# Patient Record
Sex: FEMALE | Race: WHITE | NOT HISPANIC OR LATINO | Employment: OTHER | URBAN - NONMETROPOLITAN AREA
[De-identification: names, ages, dates, MRNs, and addresses within clinical notes are randomized per-mention and may not be internally consistent; named-entity substitution may affect disease eponyms.]

---

## 2017-01-03 ENCOUNTER — PROCEDURE VISIT (OUTPATIENT)
Dept: OBSTETRICS AND GYNECOLOGY | Facility: CLINIC | Age: 72
End: 2017-01-03

## 2017-01-03 VITALS
DIASTOLIC BLOOD PRESSURE: 84 MMHG | WEIGHT: 178 LBS | SYSTOLIC BLOOD PRESSURE: 155 MMHG | HEIGHT: 66 IN | HEART RATE: 86 BPM | BODY MASS INDEX: 28.61 KG/M2

## 2017-01-03 DIAGNOSIS — Z12.31 SCREENING MAMMOGRAM, ENCOUNTER FOR: ICD-10-CM

## 2017-01-03 DIAGNOSIS — Z01.419 ENCOUNTER FOR GYNECOLOGICAL EXAMINATION WITHOUT ABNORMAL FINDING: Primary | ICD-10-CM

## 2017-01-03 DIAGNOSIS — Z80.41 FH: OVARIAN CANCER IN FIRST DEGREE RELATIVE: ICD-10-CM

## 2017-01-03 PROCEDURE — G0101 CA SCREEN;PELVIC/BREAST EXAM: HCPCS | Performed by: NURSE PRACTITIONER

## 2017-01-03 NOTE — PROGRESS NOTES
Subjective   Chief Complaint   Patient presents with   • Gynecologic Exam     annual exam     Akiko Blackwell is a 71 y.o. year old  presenting to be seen for her annual exam and mammogram. Requesting CA-125 lab since her mother had ovarian cancer. Not covered by Medicare; pt signed ABN with the option to bill Medicare and secondary insurance and she will pay the difference.     She is not sexually active.   LMP >20 years ago; denies PMB  Last pap-  WNL  Last mammogram- 16 WNL  Last DEXA- 16; osteopenia in hips, normal spine  Last colonoscopy- ; recommended 10 year f/u    She exercises regularly: no.  She wears her seat belt:yes.  She has concerns about domestic violence: no.  She has noticed changes in height: no.    The following portions of the patient's history were reviewed and updated as appropriate:problem list, current medications, allergies, past family history, past medical history, past social history and past surgical history.  Smoking status: Former Smoker                                                              Packs/day: 0.00      Years: 0.00         Smokeless status: Not on file                       Review of Systems   Constitutional: Negative for activity change, appetite change, fatigue and unexpected weight change.   Respiratory: Negative for apnea, chest tightness and shortness of breath.    Cardiovascular: Negative for chest pain, palpitations and leg swelling.   Gastrointestinal: Negative for abdominal distention, abdominal pain, anal bleeding, blood in stool, constipation, diarrhea, nausea and vomiting.   Endocrine: Negative for polyuria.   Genitourinary: Negative for decreased urine volume, difficulty urinating, dysuria, frequency, genital sores, pelvic pain, urgency, vaginal bleeding, vaginal discharge and vaginal pain.   Musculoskeletal: Negative for gait problem.   Skin: Negative for color change and rash.   Allergic/Immunologic: Negative for  "immunocompromised state.   Neurological: Negative for dizziness, weakness, light-headedness and headaches.   Hematological: Negative for adenopathy.   Psychiatric/Behavioral: Negative for agitation, confusion, dysphoric mood and sleep disturbance. The patient is not nervous/anxious.          Objective   Visit Vitals   • /84   • Pulse 86   • Ht 66\" (167.6 cm)   • Wt 178 lb (80.7 kg)   • BMI 28.73 kg/m2       General:  well developed; well nourished  no acute distress   Skin:  No suspicious lesions seen   Thyroid: normal to inspection and palpation   Breasts:  Examined in supine position  Symmetric without masses or skin dimpling  Nipples normal without inversion, lesions or discharge  There are no palpable axillary nodes   Abdomen: soft, non-tender; no masses  no umbilical or inginual hernias are present  no hepato-splenomegaly   Pelvis: External genitalia:  normal appearance of the external genitalia including Bartholin's and [place]'s glands  :  urethral meatus normal and urethra hypermobile  Vaginal:  atrophic mucosal changes are present  Cervix:  normal appearance PAP SMEAR NOT OBTAINED.  Uterus:  normal size, shape and consistency and tenderness to palpation is absent  Adnexa:  non palpable bilaterally  Rectal:  digital rectal exam not performed, anus visually normal appearing  Pelvic floor pain: pelvic floor is nontender to palpation in 4 quadrants.     Lab Review   No data reviewed    Imaging  SHANNON Wharton was seen today for gynecologic exam.    Diagnoses and all orders for this visit:    Encounter for gynecological examination without abnormal finding    Screening mammogram, encounter for  -     Mammo Screening Digital Tomosynthesis Bilateral With CAD    FH: ovarian cancer in first degree relative  -               This note was electronically signed.    "

## 2017-01-05 LAB — CANCER AG125 SERPL-ACNC: 11.5 U/ML (ref 0–38.1)

## 2017-01-09 ENCOUNTER — TELEPHONE (OUTPATIENT)
Dept: OBSTETRICS AND GYNECOLOGY | Facility: CLINIC | Age: 72
End: 2017-01-09

## 2017-01-09 NOTE — TELEPHONE ENCOUNTER
----- Message from LOUISE Lr sent at 1/9/2017 11:36 AM CST -----  Please let her know that the  was normal.

## 2017-03-16 ENCOUNTER — OFFICE VISIT (OUTPATIENT)
Dept: FAMILY MEDICINE CLINIC | Facility: CLINIC | Age: 72
End: 2017-03-16

## 2017-03-16 VITALS
OXYGEN SATURATION: 98 % | HEIGHT: 66 IN | HEART RATE: 82 BPM | WEIGHT: 177 LBS | SYSTOLIC BLOOD PRESSURE: 138 MMHG | DIASTOLIC BLOOD PRESSURE: 80 MMHG | BODY MASS INDEX: 28.45 KG/M2

## 2017-03-16 DIAGNOSIS — M17.0 PRIMARY OSTEOARTHRITIS OF BOTH KNEES: ICD-10-CM

## 2017-03-16 DIAGNOSIS — K20.90 ESOPHAGITIS: Primary | ICD-10-CM

## 2017-03-16 DIAGNOSIS — M85.80 OSTEOPENIA: ICD-10-CM

## 2017-03-16 PROCEDURE — 99214 OFFICE O/P EST MOD 30 MIN: CPT | Performed by: FAMILY MEDICINE

## 2017-03-16 RX ORDER — FAMOTIDINE 40 MG/1
40 TABLET, FILM COATED ORAL DAILY
Qty: 90 TABLET | Refills: 3 | Status: SHIPPED | OUTPATIENT
Start: 2017-03-16 | End: 2017-09-12 | Stop reason: CLARIF

## 2017-03-17 PROBLEM — M17.0 PRIMARY OSTEOARTHRITIS OF BOTH KNEES: Status: ACTIVE | Noted: 2017-03-17

## 2017-03-17 NOTE — PROGRESS NOTES
Subjective:     Akiko Blackwell is a 71 y.o. female   GERD  Paitent complains of heartburn. This has been associated with midespigastric pain and nocturnal burning.  She denies choking on food, cough, hematemesis and hoarseness. Symptoms have been present for 4 years. She denies dysphagia. She has not lost weight. She denies melena, hematochezia, hematemesis, and coffee ground emesis. Medical therapy in the past has included H2 antagonists and proton pump inhibitors.  Has been on Nexium 40 mg daily for over 4 years.  She states that she has no nocturnal burning or problems as long as she has been on the medication.  She does remember trying an H2 blocker in the past but does not remember which one or the dose.  She is concerned about long-term use of proton pump inhibitor.  Knee Pain  Patient presents with knee pain involving both knees. Onset of the symptoms was several months ago. Inciting event: none known. Current symptoms include crepitus sensation, pain located generalized, stiffness and swelling. Pain is aggravated by going up and down stairs, inactivity and rising after sitting. Patient has had no prior knee problems. Evaluation to date: none. Treatment to date: OTC analgesics which are effective.  Preventative:  Over the past 2 weeks, have you felt down, depressed, or hopeless?No   Over the past 2 weeks, have you felt little interest or pleasure in doing things?No  Clinical depression screening refused by patient.No     On osteoporosis therapy?No     Past Medical Hx:   Past Medical History   Diagnosis Date   • Acute bronchitis, unspecified    • Acute laryngitis    • Allergic rhinitis    • Atrophic vaginitis    • Diverticular disease of colon    • Encounter for gynecological examination (general) (routine) without abnormal findings    • Encounter for screening for malignant neoplasm of colon    • Encounter for screening for osteoporosis    • Esophagitis    • Family history of malignant neoplasm of  gastrointestinal tract    • GERD (gastroesophageal reflux disease)    • History of bone density study 04/29/2016     DXA BONE DENSITY AXIAL [address] [facility] (2)    • History of screening mammography 01/02/2016   • Osteopenia    • Pain in right knee    • Subclinical hypothyroidism    • Thyroid nodule    • Viral upper respiratory tract infection    • Vitamin D deficiency        Past Surgical Hx:  Past Surgical History   Procedure Laterality Date   • Breast biopsy       BX BREAST PERCUT W/O IMAGE 52138 (1)   x2   • Colonoscopy  06/20/2016     Diverticulosis in the sigmoid colon and in the descending colon.External and internal hemorrhoids.No specimens collected.   • Cervix surgery  05/15/1973     Conization biopsy of cervix. Cervical cautery.   • Dilatation and curettage  10/10/1990     Fractional   • Endoscopy w/ peg tube placement  06/20/2012     Esophagitis seen. Biopsy taken. Gastritis in stomach. Biopsy taken. Hiatus hernia in stomach. Normal duodenum. Biopsy taken.   • Injection of medication  05/04/2016     Kenalog (4)      • Pap smear  07/22/2010     Mild inflammation present. Negative   • Excision lesion  05/18/1967     Excision of inclusion cyst, vagina.   • Tonsillectomy  01/15/1964     Recurring acute tonsillitis.       Health Maintenance:  Health Maintenance   Topic Date Due   • TDAP/TD VACCINES (1 - Tdap) 09/28/1963   • INFLUENZA VACCINE  08/01/2016   • MEDICARE ANNUAL WELLNESS  03/16/2018   • DXA SCAN  04/29/2018   • COLONOSCOPY  06/20/2026   • PNEUMOCOCCAL VACCINES (65+ LOW/MEDIUM RISK)  Completed   • ZOSTER VACCINE  Completed       Current Meds:    Current Outpatient Prescriptions:   •  Calcium Carbonate-Vitamin D 600-200 MG-UNIT tablet, Take 1 tablet by mouth Daily., Disp: , Rfl:   •  levothyroxine (SYNTHROID, LEVOTHROID) 88 MCG tablet, TAKE ONE TABLET BY MOUTH DAILY, Disp: 90 tablet, Rfl: 0  •  esomeprazole (NEXIUM) 20 MG capsule, Take 1 capsule by mouth Every Morning Before Breakfast., Disp: 90  "capsule, Rfl: 1  •  famotidine (PEPCID) 40 MG tablet, Take 1 tablet by mouth Daily., Disp: 90 tablet, Rfl: 3    Allergies:  Review of patient's allergies indicates no known allergies.    Family Hx:  Family History   Problem Relation Age of Onset   • Ovarian cancer Mother      Onset age 70-74 years.   • Cancer Brother      Colorectal Cancer, onset age 70-74 years.   • Diabetes Other         Social History:  Social History     Social History   • Marital status:      Spouse name: N/A   • Number of children: N/A   • Years of education: N/A     Occupational History   • Not on file.     Social History Main Topics   • Smoking status: Former Smoker   • Smokeless tobacco: Never Used   • Alcohol use Not on file   • Drug use: Not on file   • Sexual activity: Not on file     Other Topics Concern   • Not on file     Social History Narrative       Review of Systems  Review of Systems   Constitutional: Negative for activity change, appetite change, fatigue and fever.   HENT: Negative for ear pain and sore throat.    Eyes: Negative for pain and visual disturbance.   Respiratory: Negative for cough and shortness of breath.    Cardiovascular: Negative for chest pain and palpitations.   Gastrointestinal: Negative for abdominal pain and nausea.   Endocrine: Negative for cold intolerance and heat intolerance.   Genitourinary: Negative for difficulty urinating and dysuria.   Musculoskeletal: Positive for arthralgias. Negative for gait problem.   Skin: Negative for color change and rash.   Neurological: Negative for dizziness, weakness and headaches.   Hematological: Negative for adenopathy. Does not bruise/bleed easily.   Psychiatric/Behavioral: Negative for agitation, confusion and sleep disturbance.       Objective:     Visit Vitals   • /80 (BP Location: Right arm, Patient Position: Sitting, Cuff Size: Adult)   • Pulse 82   • Ht 66\" (167.6 cm)   • Wt 177 lb (80.3 kg)   • SpO2 98%   • BMI 28.57 kg/m2     Physical Exam "   Constitutional: She is oriented to person, place, and time. She appears well-developed and well-nourished.   HENT:   Head: Normocephalic and atraumatic.   Right Ear: Hearing, tympanic membrane, external ear and ear canal normal.   Left Ear: Hearing, tympanic membrane, external ear and ear canal normal.   Nose: Nose normal.   Mouth/Throat: Oropharynx is clear and moist.   Eyes: Conjunctivae and EOM are normal. Pupils are equal, round, and reactive to light.   Neck: Normal range of motion. Neck supple.   Cardiovascular: Normal rate, regular rhythm and normal heart sounds.    Pulmonary/Chest: Effort normal and breath sounds normal.   Abdominal: Soft. Bowel sounds are normal. She exhibits no distension. There is no tenderness.   Musculoskeletal: Normal range of motion.        Right knee: She exhibits bony tenderness. Tenderness found. Lateral joint line tenderness noted.        Left knee: She exhibits bony tenderness. Tenderness found. Lateral joint line tenderness noted.   Crepitus bilateral knees   Neurological: She is alert and oriented to person, place, and time.   Skin: Skin is warm and dry.   Psychiatric: She has a normal mood and affect. Her speech is normal and behavior is normal. Cognition and memory are normal.             Assessment:       1. Esophagitis    2. Osteopenia    3. Primary osteoarthritis of both knees         Plan:     1.  Rx changes: Decrease nexium to 20mg. Will attempt to wean off time pump inhibitor.  Patient will start using Pepcid.   Discontinue calcium carbonate and get calcium citrate as on PPI.  2.  Follow up: 10 months   3. Continue follow up with Endocrinology. Will take over mammogram screenings.     GOALS:  Weight loss 10 pounds  BARRIERS TO GOALS:  None    Preventative:  Female Preventative: Colon cancer screening is up to date    Recommended:Tdap advised to get at the health department. Will obtain records from [facility] for zostavax and influenza.   Patient is a non smoker.    does not drink  eat more fruits and vegetables, decrease soda or juice intake, increase water intake, increase physical activity, plan meals, eat breakfast and have 3 meals a day discussed non-weight bearing exercises for knee    RISK SCORE: 3         This document has been electronically signed by Barbie Murray MD on March 17, 2017 3:35 PM

## 2017-04-12 RX ORDER — LEVOTHYROXINE SODIUM 88 UG/1
88 TABLET ORAL DAILY
Qty: 90 TABLET | Refills: 3 | Status: SHIPPED | OUTPATIENT
Start: 2017-04-12 | End: 2017-05-15 | Stop reason: SDUPTHER

## 2017-04-19 RX ORDER — ESOMEPRAZOLE MAGNESIUM 40 MG/1
40 CAPSULE, DELAYED RELEASE ORAL
Qty: 30 CAPSULE | Refills: 3 | Status: SHIPPED | OUTPATIENT
Start: 2017-04-19 | End: 2017-09-12 | Stop reason: SDUPTHER

## 2017-05-08 ENCOUNTER — LAB (OUTPATIENT)
Dept: LAB | Facility: HOSPITAL | Age: 72
End: 2017-05-08

## 2017-05-08 DIAGNOSIS — E53.8 B12 DEFICIENCY: ICD-10-CM

## 2017-05-08 DIAGNOSIS — E06.3 HYPOTHYROIDISM DUE TO HASHIMOTO'S THYROIDITIS: ICD-10-CM

## 2017-05-08 DIAGNOSIS — M85.80 OSTEOPENIA: ICD-10-CM

## 2017-05-08 DIAGNOSIS — E03.8 HYPOTHYROIDISM DUE TO HASHIMOTO'S THYROIDITIS: ICD-10-CM

## 2017-05-08 DIAGNOSIS — E04.1 THYROID NODULE: ICD-10-CM

## 2017-05-08 DIAGNOSIS — E55.9 VITAMIN D DEFICIENCY: ICD-10-CM

## 2017-05-08 LAB
25(OH)D3 SERPL-MCNC: 29.4 NG/ML (ref 30–100)
ALBUMIN SERPL-MCNC: 4.3 G/DL (ref 3.4–4.8)
ALBUMIN/GLOB SERPL: 1.5 G/DL (ref 1.1–1.8)
ALP SERPL-CCNC: 116 U/L (ref 38–126)
ALT SERPL W P-5'-P-CCNC: 28 U/L (ref 9–52)
ANION GAP SERPL CALCULATED.3IONS-SCNC: 10 MMOL/L (ref 5–15)
AST SERPL-CCNC: 36 U/L (ref 14–36)
BASOPHILS # BLD AUTO: 0.02 10*3/MM3 (ref 0–0.2)
BASOPHILS NFR BLD AUTO: 0.4 % (ref 0–2)
BILIRUB SERPL-MCNC: 0.5 MG/DL (ref 0.2–1.3)
BUN BLD-MCNC: 19 MG/DL (ref 7–21)
BUN/CREAT SERPL: 22.1 (ref 7–25)
CALCIUM SPEC-SCNC: 9.6 MG/DL (ref 8.4–10.2)
CHLORIDE SERPL-SCNC: 102 MMOL/L (ref 95–110)
CO2 SERPL-SCNC: 28 MMOL/L (ref 22–31)
CREAT BLD-MCNC: 0.86 MG/DL (ref 0.5–1)
DEPRECATED RDW RBC AUTO: 39.3 FL (ref 36.4–46.3)
EOSINOPHIL # BLD AUTO: 0.07 10*3/MM3 (ref 0–0.7)
EOSINOPHIL NFR BLD AUTO: 1.5 % (ref 0–7)
ERYTHROCYTE [DISTWIDTH] IN BLOOD BY AUTOMATED COUNT: 12.3 % (ref 11.5–14.5)
GFR SERPL CREATININE-BSD FRML MDRD: 65 ML/MIN/1.73 (ref 39–90)
GLOBULIN UR ELPH-MCNC: 2.9 GM/DL (ref 2.3–3.5)
GLUCOSE BLD-MCNC: 96 MG/DL (ref 60–100)
HCT VFR BLD AUTO: 43.1 % (ref 35–45)
HGB BLD-MCNC: 15.3 G/DL (ref 12–15.5)
IMM GRANULOCYTES # BLD: 0.01 10*3/MM3 (ref 0–0.02)
IMM GRANULOCYTES NFR BLD: 0.2 % (ref 0–0.5)
LYMPHOCYTES # BLD AUTO: 1.44 10*3/MM3 (ref 0.6–4.2)
LYMPHOCYTES NFR BLD AUTO: 30.5 % (ref 10–50)
MAGNESIUM SERPL-MCNC: 2.1 MG/DL (ref 1.6–2.3)
MCH RBC QN AUTO: 31.2 PG (ref 26.5–34)
MCHC RBC AUTO-ENTMCNC: 35.5 G/DL (ref 31.4–36)
MCV RBC AUTO: 87.8 FL (ref 80–98)
MONOCYTES # BLD AUTO: 0.37 10*3/MM3 (ref 0–0.9)
MONOCYTES NFR BLD AUTO: 7.8 % (ref 0–12)
NEUTROPHILS # BLD AUTO: 2.81 10*3/MM3 (ref 2–8.6)
NEUTROPHILS NFR BLD AUTO: 59.6 % (ref 37–80)
PLATELET # BLD AUTO: 243 10*3/MM3 (ref 150–450)
PMV BLD AUTO: 9.7 FL (ref 8–12)
POTASSIUM BLD-SCNC: 4.4 MMOL/L (ref 3.5–5.1)
PROT SERPL-MCNC: 7.2 G/DL (ref 6.3–8.6)
RBC # BLD AUTO: 4.91 10*6/MM3 (ref 3.77–5.16)
SODIUM BLD-SCNC: 140 MMOL/L (ref 137–145)
TSH SERPL DL<=0.05 MIU/L-ACNC: 5.63 MIU/ML (ref 0.46–4.68)
VIT B12 BLD-MCNC: 318 PG/ML (ref 239–931)
WBC NRBC COR # BLD: 4.72 10*3/MM3 (ref 3.2–9.8)

## 2017-05-08 PROCEDURE — 36415 COLL VENOUS BLD VENIPUNCTURE: CPT

## 2017-05-08 PROCEDURE — 84443 ASSAY THYROID STIM HORMONE: CPT | Performed by: INTERNAL MEDICINE

## 2017-05-08 PROCEDURE — 83735 ASSAY OF MAGNESIUM: CPT | Performed by: INTERNAL MEDICINE

## 2017-05-08 PROCEDURE — 82306 VITAMIN D 25 HYDROXY: CPT | Performed by: INTERNAL MEDICINE

## 2017-05-08 PROCEDURE — 85025 COMPLETE CBC W/AUTO DIFF WBC: CPT | Performed by: INTERNAL MEDICINE

## 2017-05-08 PROCEDURE — 82607 VITAMIN B-12: CPT | Performed by: INTERNAL MEDICINE

## 2017-05-08 PROCEDURE — 80053 COMPREHEN METABOLIC PANEL: CPT | Performed by: INTERNAL MEDICINE

## 2017-05-15 ENCOUNTER — OFFICE VISIT (OUTPATIENT)
Dept: ENDOCRINOLOGY | Facility: CLINIC | Age: 72
End: 2017-05-15

## 2017-05-15 VITALS
SYSTOLIC BLOOD PRESSURE: 134 MMHG | BODY MASS INDEX: 28.54 KG/M2 | WEIGHT: 176.8 LBS | DIASTOLIC BLOOD PRESSURE: 78 MMHG | HEART RATE: 97 BPM

## 2017-05-15 DIAGNOSIS — M85.80 OSTEOPENIA: ICD-10-CM

## 2017-05-15 DIAGNOSIS — E55.9 VITAMIN D DEFICIENCY: ICD-10-CM

## 2017-05-15 DIAGNOSIS — E03.8 HYPOTHYROIDISM DUE TO HASHIMOTO'S THYROIDITIS: Primary | ICD-10-CM

## 2017-05-15 DIAGNOSIS — E53.8 B12 DEFICIENCY: ICD-10-CM

## 2017-05-15 DIAGNOSIS — E06.3 HYPOTHYROIDISM DUE TO HASHIMOTO'S THYROIDITIS: Primary | ICD-10-CM

## 2017-05-15 PROCEDURE — 99214 OFFICE O/P EST MOD 30 MIN: CPT | Performed by: INTERNAL MEDICINE

## 2017-05-15 RX ORDER — ERGOCALCIFEROL 1.25 MG/1
50000 CAPSULE ORAL
Qty: 6 CAPSULE | Refills: 3 | Status: SHIPPED | OUTPATIENT
Start: 2017-05-15 | End: 2018-01-17

## 2017-05-15 RX ORDER — ERGOCALCIFEROL 1.25 MG/1
50000 CAPSULE ORAL
Qty: 2 CAPSULE | Refills: 11 | Status: SHIPPED | OUTPATIENT
Start: 2017-05-15 | End: 2017-05-15 | Stop reason: SDUPTHER

## 2017-05-15 RX ORDER — LEVOTHYROXINE SODIUM 88 UG/1
88 TABLET ORAL DAILY
Qty: 90 TABLET | Refills: 3 | Status: SHIPPED | OUTPATIENT
Start: 2017-05-15 | End: 2018-01-17 | Stop reason: SDUPTHER

## 2017-09-12 ENCOUNTER — TELEPHONE (OUTPATIENT)
Dept: FAMILY MEDICINE CLINIC | Facility: CLINIC | Age: 72
End: 2017-09-12

## 2017-09-12 RX ORDER — ESOMEPRAZOLE MAGNESIUM 40 MG/1
40 CAPSULE, DELAYED RELEASE ORAL
Qty: 90 CAPSULE | Refills: 3 | Status: SHIPPED | OUTPATIENT
Start: 2017-09-12 | End: 2018-01-29 | Stop reason: SDUPTHER

## 2017-09-12 NOTE — TELEPHONE ENCOUNTER
[facility] PT///    PT is requesting a refill of: NEXIUM (90 DAY SUPPLY so insurance will cover it)    Sent to: [facility]    PT also states that she is not taking the PEPCID can you discontinue this?    Please send this at your earliest convenience or let PT know if there is any issues filling this Prescription.    Thanks.

## 2017-11-30 ENCOUNTER — OFFICE VISIT (OUTPATIENT)
Dept: ORTHOPEDIC SURGERY | Facility: CLINIC | Age: 72
End: 2017-11-30

## 2017-11-30 VITALS — HEIGHT: 66 IN | BODY MASS INDEX: 27.5 KG/M2 | WEIGHT: 171.1 LBS

## 2017-11-30 DIAGNOSIS — M75.102 ROTATOR CUFF SYNDROME OF LEFT SHOULDER: ICD-10-CM
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
## 2017-11-30 DIAGNOSIS — M25.512 LEFT SHOULDER PAIN, UNSPECIFIED CHRONICITY: Primary | ICD-10-CM

## 2017-11-30 PROCEDURE — 99214 OFFICE O/P EST MOD 30 MIN: CPT | Performed by: ORTHOPAEDIC SURGERY

## 2017-12-11 ENCOUNTER — HOSPITAL ENCOUNTER (OUTPATIENT)
Dept: MRI IMAGING | Facility: HOSPITAL | Age: 72
Discharge: HOME | End: 2017-12-11
Admitting: ORTHOPAEDIC SURGERY

## 2017-12-11 DIAGNOSIS — M75.102 ROTATOR CUFF SYNDROME OF LEFT SHOULDER: ICD-10-CM

## 2017-12-11 DIAGNOSIS — M25.512 LEFT SHOULDER PAIN, UNSPECIFIED CHRONICITY: ICD-10-CM

## 2017-12-11 PROCEDURE — 73221 MRI JOINT UPR EXTREM W/O DYE: CPT

## 2017-12-22 ENCOUNTER — OFFICE VISIT (OUTPATIENT)
Dept: ORTHOPEDIC SURGERY | Facility: CLINIC | Age: 72
End: 2017-12-22

## 2017-12-22 VITALS — BODY MASS INDEX: 27.48 KG/M2 | WEIGHT: 171 LBS | HEIGHT: 66 IN

## 2017-12-22 DIAGNOSIS — M25.512 LEFT SHOULDER PAIN, UNSPECIFIED CHRONICITY: Primary | ICD-10-CM

## 2017-12-22 DIAGNOSIS — M75.102 ROTATOR CUFF SYNDROME OF LEFT SHOULDER: ICD-10-CM

## 2017-12-22 DIAGNOSIS — M75.42 SHOULDER IMPINGEMENT SYNDROME, LEFT: ICD-10-CM

## 2017-12-22 PROCEDURE — 99213 OFFICE O/P EST LOW 20 MIN: CPT | Performed by: ORTHOPAEDIC SURGERY

## 2017-12-22 PROCEDURE — 20610 DRAIN/INJ JOINT/BURSA W/O US: CPT | Performed by: ORTHOPAEDIC SURGERY

## 2017-12-22 RX ORDER — MELOXICAM 15 MG/1
TABLET ORAL
Qty: 30 TABLET | Refills: 3 | Status: SHIPPED | OUTPATIENT
Start: 2017-12-22 | End: 2018-01-03 | Stop reason: SINTOL

## 2017-12-22 RX ADMIN — LIDOCAINE HYDROCHLORIDE 2 ML: 10 INJECTION, SOLUTION INFILTRATION; PERINEURAL at 08:41

## 2017-12-22 RX ADMIN — LIDOCAINE HYDROCHLORIDE 2 ML: 20 INJECTION, SOLUTION INFILTRATION; PERINEURAL at 08:41

## 2017-12-22 NOTE — PROGRESS NOTES
Akiko Blackwell is a 72 y.o. female returns for     Chief Complaint   Patient presents with   • Left Shoulder - Follow-up   • Results     MRI 12/11/17       HISTORY OF PRESENT ILLNESS:  Pain in left shoulder  Can't sleep  Pain all the time  Continues to have pain over the last 8 weeks and she is just exhausted     CONCURRENT MEDICAL HISTORY:    Past Medical History:   Diagnosis Date   • Acute bronchitis, unspecified    • Acute laryngitis    • Allergic rhinitis    • Atrophic vaginitis    • Diverticular disease of colon    • Encounter for gynecological examination (general) (routine) without abnormal findings    • Encounter for screening for malignant neoplasm of colon    • Encounter for screening for osteoporosis    • Esophagitis    • Family history of malignant neoplasm of gastrointestinal tract    • GERD (gastroesophageal reflux disease)    • History of bone density study 04/29/2016    DXA BONE DENSITY AXIAL [address] [facility] (2)    • History of screening mammography 01/02/2016   • Osteopenia    • Pain in right knee    • Subclinical hypothyroidism    • Thyroid nodule    • Viral upper respiratory tract infection    • Vitamin D deficiency        No Known Allergies      Current Outpatient Prescriptions:   •  Calcium Carbonate-Vitamin D 600-200 MG-UNIT tablet, Take 1 tablet by mouth Daily., Disp: , Rfl:   •  esomeprazole (NEXIUM) 40 MG capsule, Take 1 capsule by mouth Every Morning Before Breakfast., Disp: 90 capsule, Rfl: 3  •  levothyroxine (SYNTHROID, LEVOTHROID) 88 MCG tablet, Take 1 tablet by mouth Daily., Disp: 90 tablet, Rfl: 3  •  vitamin D (ERGOCALCIFEROL) 51002 UNITS capsule capsule, Take 1 capsule by mouth Every 14 (Fourteen) Days., Disp: 6 capsule, Rfl: 3  •  meloxicam (MOBIC) 15 MG tablet, 1 PO Daily with food., Disp: 30 tablet, Rfl: 3    Past Surgical History:   Procedure Laterality Date   • BREAST BIOPSY      BX BREAST PERCUT W/O IMAGE 19100 (1)   x2   • CERVIX SURGERY  05/15/1973    Conization  "biopsy of cervix. Cervical cautery.   • COLONOSCOPY  06/20/2016    Diverticulosis in the sigmoid colon and in the descending colon.External and internal hemorrhoids.No specimens collected.   • DILATATION AND CURETTAGE  10/10/1990    Fractional   • ENDOSCOPY W/ PEG TUBE PLACEMENT  06/20/2012    Esophagitis seen. Biopsy taken. Gastritis in stomach. Biopsy taken. Hiatus hernia in stomach. Normal duodenum. Biopsy taken.   • EXCISION LESION  05/18/1967    Excision of inclusion cyst, vagina.   • INJECTION OF MEDICATION  05/04/2016    Kenalog (4)      • PAP SMEAR  07/22/2010    Mild inflammation present. Negative   • TONSILLECTOMY  01/15/1964    Recurring acute tonsillitis.       ROS  No fevers or chills.  No chest pain or shortness of air.  No GI or  disturbances.    PHYSICAL EXAMINATION:       Ht 167.6 cm (66\")  Wt 77.6 kg (171 lb)  LMP  (LMP Unknown)  BMI 27.6 kg/m2    Physical Exam   Constitutional: She is oriented to person, place, and time. She appears well-developed and well-nourished.   Neurological: She is alert and oriented to person, place, and time.   Psychiatric: She has a normal mood and affect. Her behavior is normal. Judgment and thought content normal.         Right Shoulder Exam     Tenderness   The patient is experiencing no tenderness.        Range of Motion   The patient has normal right shoulder ROM.    Muscle Strength   The patient has normal right shoulder strength.    Tests   Cross Arm: negative  Hawkin's test: negative  Impingement: negative    Other   Erythema: absent  Sensation: normal  Pulse: present      Left Shoulder Exam     Tenderness   The patient is experiencing tenderness in the acromioclavicular joint and acromion.    Range of Motion   Active Abduction: 60   Forward Flexion: 100     Muscle Strength   Abduction: 3/5   Supraspinatus: 3/5     Tests   Cross Arm: positive  Hawkin's test: positive  Impingement: positive    Other   Erythema: absent  Sensation: normal  Pulse: present "               Mri Shoulder Left Wo Contrast    Result Date: 12/11/2017  Narrative: MRI left shoulder without contrast. 60: Left shoulder pain. Prior exam: Left shoulder x-ray November 16, 2017. TECHNIQUE: Multiplanar multisequence noncontrast images of the left shoulder. There is acromioclavicular joint arthrosis. Capsule hypertrophy and osteophytes arising from the distal clavicle causing mild degree of underlying impingement. Series 4 image six. There is large amount of fluid in the subdeltoid and subacromial bursa, bursitis. Subtle partial-thickness bursal surface tear supraspinatous tendon just beneath the acromioclavicular joint. No evidence of a full-thickness tear. Normal subscapularis tendon. Normal glenoid bernard. Normal biceps tendon.     Impression: CONCLUSION: Acromioclavicular joint arthrosis causing mild degree of underlying impingement. Subtle partial-thickness bursal surface tear supraspinatus tendon just beneath the acromioclavicular joint. No evidence of a full-thickness rotator cuff tear. Large amount of fluid in the subacromial and subdeltoid bursa, bursitis. MRI left shoulder is otherwise unremarkable. Electronically signed by:  Brent Laird MD  12/11/2017 8:43 PM CST Workstation: [id number]      Large Joint Arthrocentesis  Date/Time: 12/22/2017 8:41 AM  Consent given by: patient  Site marked: site marked  Timeout: Immediately prior to procedure a time out was called to verify the correct patient, procedure, equipment, support staff and site/side marked as required   Supporting Documentation  Indications: pain   Procedure Details  Location: shoulder - L subacromial bursa  Preparation: Patient was prepped and draped in the usual sterile fashion  Needle size: 22 G  Approach: posterior  Medications administered: 2 mL lidocaine 1 %; 2 mL lidocaine 2%  Patient tolerance: patient tolerated the procedure well with no immediate complications              ASSESSMENT:    Diagnoses and all orders for this  visit:    Left shoulder pain, unspecified chronicity  -     Large Joint Arthrocentesis  -     Ambulatory Referral to Physical Therapy Evaluate and treat    Rotator cuff syndrome of left shoulder  -     Large Joint Arthrocentesis  -     Ambulatory Referral to Physical Therapy Evaluate and treat    Shoulder impingement syndrome, left  -     Large Joint Arthrocentesis  -     Ambulatory Referral to Physical Therapy Evaluate and treat    Other orders  -     meloxicam (MOBIC) 15 MG tablet; 1 PO Daily with food.          PLAN    Injection into left shoulder  Begin PT for ROM/STR, cuff exercises, impingement exercises  Start meloxicam    Her pain has been consistent with the injury mechanism from her fall in the boat.  I do believe her shoulder issues are related to that injury.    Return in about 4 weeks (around 1/19/2018) for recheck.    Good Beard MD

## 2017-12-25 RX ORDER — LIDOCAINE HYDROCHLORIDE 10 MG/ML
2 INJECTION, SOLUTION INFILTRATION; PERINEURAL
Status: COMPLETED | OUTPATIENT
Start: 2017-12-22 | End: 2017-12-22

## 2017-12-25 RX ORDER — LIDOCAINE HYDROCHLORIDE 20 MG/ML
2 INJECTION, SOLUTION INFILTRATION; PERINEURAL
Status: COMPLETED | OUTPATIENT
Start: 2017-12-22 | End: 2017-12-22

## 2018-01-02 ENCOUNTER — TELEPHONE (OUTPATIENT)
Dept: ORTHOPEDIC SURGERY | Facility: CLINIC | Age: 73
End: 2018-01-02

## 2018-01-02 NOTE — TELEPHONE ENCOUNTER
PT IS TAKING MELOXICAM 15MG, SHE IS WANTING SOMETHING ELSE,BECAUSE THIS IS GIVING HER STOMACH ISSUES.

## 2018-01-02 NOTE — TELEPHONE ENCOUNTER
Stop meloxicam - wait 3 days and see if stomach issues resolve  When stomach is improved then can try nabumetone BID (750)  Thanks  ERIC

## 2018-01-03 RX ORDER — NABUMETONE 750 MG/1
750 TABLET, FILM COATED ORAL 2 TIMES DAILY PRN
Qty: 60 TABLET | Refills: 0 | Status: SHIPPED | OUTPATIENT
Start: 2018-01-03 | End: 2018-01-29

## 2018-01-04 ENCOUNTER — HOSPITAL ENCOUNTER (OUTPATIENT)
Dept: PHYSICAL THERAPY | Facility: HOSPITAL | Age: 73
Setting detail: THERAPIES SERIES
Discharge: HOME | End: 2018-01-04
Attending: ORTHOPAEDIC SURGERY

## 2018-01-04 DIAGNOSIS — M75.102 ROTATOR CUFF SYNDROME OF LEFT SHOULDER: ICD-10-CM

## 2018-01-04 DIAGNOSIS — M75.42 SHOULDER IMPINGEMENT SYNDROME, LEFT: ICD-10-CM

## 2018-01-04 DIAGNOSIS — M25.512 LEFT SHOULDER PAIN, UNSPECIFIED CHRONICITY: Primary | ICD-10-CM

## 2018-01-04 PROCEDURE — 97161 PT EVAL LOW COMPLEX 20 MIN: CPT | Performed by: PHYSICAL THERAPIST

## 2018-01-04 PROCEDURE — G8984 CARRY CURRENT STATUS: HCPCS | Performed by: PHYSICAL THERAPIST

## 2018-01-04 PROCEDURE — 97110 THERAPEUTIC EXERCISES: CPT | Performed by: PHYSICAL THERAPIST

## 2018-01-04 PROCEDURE — G8985 CARRY GOAL STATUS: HCPCS | Performed by: PHYSICAL THERAPIST
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
## 2018-01-08 ENCOUNTER — HOSPITAL ENCOUNTER (OUTPATIENT)
Dept: PHYSICAL THERAPY | Facility: HOSPITAL | Age: 73
Setting detail: THERAPIES SERIES
Discharge: HOME | End: 2018-01-08
Attending: ORTHOPAEDIC SURGERY

## 2018-01-08 DIAGNOSIS — M25.512 LEFT SHOULDER PAIN, UNSPECIFIED CHRONICITY: Primary | ICD-10-CM

## 2018-01-08 DIAGNOSIS — M75.42 SHOULDER IMPINGEMENT SYNDROME, LEFT: ICD-10-CM

## 2018-01-08 DIAGNOSIS — M75.102 ROTATOR CUFF SYNDROME OF LEFT SHOULDER: ICD-10-CM

## 2018-01-08 PROCEDURE — 97140 MANUAL THERAPY 1/> REGIONS: CPT

## 2018-01-08 PROCEDURE — G0283 ELEC STIM OTHER THAN WOUND: HCPCS

## 2018-01-08 PROCEDURE — 97110 THERAPEUTIC EXERCISES: CPT

## 2018-01-08 NOTE — THERAPY TREATMENT NOTE
Outpatient Physical Therapy Ortho Treatment Note  [facility]     Patient Name: Akiko Blackwell  : 1945  MRN: [id number]  Today's Date: 2018      Visit Date: 2018     Subjective Improvement: 0%  Attendance:  2/2 (medicare)  Next MD Visit : 2-3 weeks  Recert Date:  18      Therapy Diagnosis:  Left Shoulder Pain         Visit Dx:    ICD-10-CM ICD-9-CM   1. Left shoulder pain, unspecified chronicity M25.512 719.41   2. Rotator cuff syndrome of left shoulder M75.102 726.10   3. Shoulder impingement syndrome, left M75.42 726.2       Patient Active Problem List   Diagnosis   • Hypothyroidism due to Hashimoto's thyroiditis   • Vitamin D deficiency   • B12 deficiency   • Thyroid nodule   • Osteopenia   • FH: ovarian cancer in first degree relative   • Esophagitis   • Atrophic vaginitis   • Allergic rhinitis   • Diverticular disease of colon   • Primary osteoarthritis of both knees   • Family history of malignant neoplasm of gastrointestinal tract        Past Medical History:   Diagnosis Date   • Acute bronchitis, unspecified    • Acute laryngitis    • Allergic rhinitis    • Atrophic vaginitis    • Diverticular disease of colon    • Encounter for gynecological examination (general) (routine) without abnormal findings    • Encounter for screening for malignant neoplasm of colon    • Encounter for screening for osteoporosis    • Esophagitis    • Family history of malignant neoplasm of gastrointestinal tract    • GERD (gastroesophageal reflux disease)    • History of bone density study 2016    DXA BONE DENSITY AXIAL [address] [facility] (2)    • History of screening mammography 2016   • Osteopenia    • Pain in right knee    • Subclinical hypothyroidism    • Thyroid nodule    • Viral upper respiratory tract infection    • Vitamin D deficiency         Past Surgical History:   Procedure Laterality Date   • BREAST BIOPSY      BX BREAST PERCUT W/O IMAGE 80857 (1)   x2   • CERVIX SURGERY   05/15/1973    Conization biopsy of cervix. Cervical cautery.   • COLONOSCOPY  06/20/2016    Diverticulosis in the sigmoid colon and in the descending colon.External and internal hemorrhoids.No specimens collected.   • DILATATION AND CURETTAGE  10/10/1990    Fractional   • ENDOSCOPY W/ PEG TUBE PLACEMENT  06/20/2012    Esophagitis seen. Biopsy taken. Gastritis in stomach. Biopsy taken. Hiatus hernia in stomach. Normal duodenum. Biopsy taken.   • EXCISION LESION  05/18/1967    Excision of inclusion cyst, vagina.   • INJECTION OF MEDICATION  05/04/2016    Kenalog (4)      • PAP SMEAR  07/22/2010    Mild inflammation present. Negative   • TONSILLECTOMY  01/15/1964    Recurring acute tonsillitis.             PT Ortho       01/08/18 1430    Precautions and Contraindications    Precautions/Limitations no known precautions/limitations  -    Contraindications MRI shows spurs/arthritis  -    Subjective Pain    Post-Treatment Pain Level 2  -    Posture/Observations    Posture/Observations Comments TTP muscle tissue in upper left arm; guarded with PROM: rounded shoulde posture  -      User Key  (r) = Recorded By, (t) = Taken By, (c) = Cosigned By    Initials Name Provider Type    CHECO Del Cid, PTA Physical Therapy Assistant                            PT Assessment/Plan       01/08/18 1430       PT Assessment    Assessment Comments no change in HEP this date;  tolerated PROM and manual well but did have pain at end range and needed VC for decreased guarding.   -     PT Plan    PT Frequency 2x/week  -     Predicted Duration of Therapy Intervention (days/wks) 4 weeks  -     Planned CPT's? PT ELECTRICAL STIM UNATTEND: ;PT RE-EVAL: 37179;PT THER PROC EA 15 MIN: 69000;PT MANUAL THERAPY EA 15 MIN: 51719;PT THER SUPP EA 15 MIN  -BB     Physical Therapy Interventions (Optional Details) modalities  -BB     PT Plan Comments Continue POC set IE. Please add estim with ice or heat as needed for pain.   -       User Key   (r) = Recorded By, (t) = Taken By, (c) = Cosigned By    Initials Name Provider Type    BETTY Olson, PT Physical Therapist    CHECO Del Cid PTA Physical Therapy Assistant                Modalities       01/08/18 1430          Ice    Ice Applied Yes  -KH      Location Left shoulder w/ IFC 20 min  -KH      Rx Minutes Other:  -KH      Ice S/P Rx Yes  -KH      ELECTRICAL STIMULATION    Attended/Unattended Unattended  -KH      Stimulation Type IFC  -KH      Location/Electrode Placement/Other Left Shoulder with ICE  -KH      Rx Minutes 20 mins  -KH        User Key  (r) = Recorded By, (t) = Taken By, (c) = Cosigned By    Initials Name Provider Type    CHECO Del Cid PTA Physical Therapy Assistant                Exercises       01/08/18 1430          Subjective Comments    Subjective Comments Doing well with exercises at home.  Reports that her shoulder is feeling some better.  Able to put jacket on and off better.   -KH      Subjective Pain    Able to rate subjective pain? yes  -KH      Pre-Treatment Pain Level 2  -KH      Post-Treatment Pain Level 2  -KH      Exercise 1    Exercise Name 1 scap squeezes  -KH      Sets 1 2  -KH      Reps 1 10  -KH      Exercise 2    Exercise Name 2 shoulder shrugs  -KH      Sets 2 2  -KH      Reps 2 10  -KH      Exercise 3    Exercise Name 3 reviewed and changed wall walk postioning for pain   -KH      Time (Minutes) 3 3'  -KH      Exercise 4    Exercise Name 4 pulley's flex/abd  -KH      Sets 4 2  -KH      Reps 4 10  -KH      Exercise 5    Exercise Name 5 supine chest press hands clasped  -KH      Sets 5 2  -KH      Reps 5 10  -KH      Exercise 6    Exercise Name 6 supine shoulder flex  -KH      Sets 6 2  -KH      Reps 6 10  -KH      Exercise 7    Exercise Name 7 PROM/JT mobes/MFR to bicep/tricep/deltoids  -KH      Time (Minutes) 7 10'  -KH        User Key  (r) = Recorded By, (t) = Taken By, (c) = Cosigned By    Initials Name Provider Type    CHECO Del Cid PTA Physical Therapy  Assistant                               PT OP Goals       01/08/18 1430       PT Short Term Goals    STG Date to Achieve 01/25/18  -     STG 1 Independent in HEP   -     STG 1 Progress Ongoing  -     STG 2 AROM shoulder flex 120 degrees or better   -     STG 2 Progress Progressing  -     STG 3 AROM shoulder abduction 120 degrees or better   -     STG 3 Progress Progressing  -     STG 4 ER AROM with arm at side 45 degrees or better   -     STG 4 Progress Progressing  -     STG 5 Shoulder MMT grossly 4+/5 or better   -     STG 5 Progress Progressing  -     Time Calculation    PT Goal Re-Cert Due Date 01/25/18  -       User Key  (r) = Recorded By, (t) = Taken By, (c) = Cosigned By    Initials Name Provider Type    CHECO Del Cid PTA Physical Therapy Assistant                         Time Calculation:   Start Time: 1430  Stop Time: 1528  Time Calculation (min): 58 min  Total Timed Code Minutes- PT: 38 minute(s)    Therapy Charges for Today     Code Description Service Date Service Provider Modifiers Qty    90957675619 HC PT THER SUPP EA 15 MIN 1/8/2018 Nevaeh Del Cid PTA GP 1    49090014720 HC PT ELECTRICAL STIM UNATTENDED 1/8/2018 Nevaeh Del Cid PTA  1    30309674976 HC PT MANUAL THERAPY EA 15 MIN 1/8/2018 Nevaeh Del Cid PTA GP 1    97513903162 HC PT THER PROC EA 15 MIN 1/8/2018 Nevaeh Del Cid PTA GP 2                    Nevaeh Del Cid PTA  1/8/2018

## 2018-01-10 ENCOUNTER — APPOINTMENT (OUTPATIENT)
Dept: LAB | Facility: HOSPITAL | Age: 73
End: 2018-01-10

## 2018-01-10 LAB
25(OH)D3 SERPL-MCNC: 34.1 NG/ML (ref 30–100)
ALBUMIN SERPL-MCNC: 4.6 G/DL (ref 3.4–4.8)
ALBUMIN/GLOB SERPL: 1.5 G/DL (ref 1.1–1.8)
ALP SERPL-CCNC: 125 U/L (ref 38–126)
ALT SERPL W P-5'-P-CCNC: 32 U/L (ref 9–52)
ANION GAP SERPL CALCULATED.3IONS-SCNC: 10 MMOL/L (ref 5–15)
AST SERPL-CCNC: 33 U/L (ref 14–36)
BASOPHILS # BLD AUTO: 0.02 10*3/MM3 (ref 0–0.2)
BASOPHILS NFR BLD AUTO: 0.2 % (ref 0–2)
BILIRUB SERPL-MCNC: 0.7 MG/DL (ref 0.2–1.3)
BUN BLD-MCNC: 14 MG/DL (ref 7–21)
BUN/CREAT SERPL: 18.7 (ref 7–25)
CALCIUM SPEC-SCNC: 10.1 MG/DL (ref 8.4–10.2)
CHLORIDE SERPL-SCNC: 100 MMOL/L (ref 95–110)
CO2 SERPL-SCNC: 30 MMOL/L (ref 22–31)
CREAT BLD-MCNC: 0.75 MG/DL (ref 0.5–1)
DEPRECATED RDW RBC AUTO: 43 FL (ref 36.4–46.3)
EOSINOPHIL # BLD AUTO: 0.08 10*3/MM3 (ref 0–0.7)
EOSINOPHIL NFR BLD AUTO: 0.9 % (ref 0–7)
ERYTHROCYTE [DISTWIDTH] IN BLOOD BY AUTOMATED COUNT: 12.9 % (ref 11.5–14.5)
GFR SERPL CREATININE-BSD FRML MDRD: 76 ML/MIN/1.73 (ref 39–90)
GLOBULIN UR ELPH-MCNC: 3.1 GM/DL (ref 2.3–3.5)
GLUCOSE BLD-MCNC: 94 MG/DL (ref 60–100)
HCT VFR BLD AUTO: 46.6 % (ref 35–45)
HGB BLD-MCNC: 16.2 G/DL (ref 12–15.5)
IMM GRANULOCYTES # BLD: 0.02 10*3/MM3 (ref 0–0.02)
IMM GRANULOCYTES NFR BLD: 0.2 % (ref 0–0.5)
LYMPHOCYTES # BLD AUTO: 1.71 10*3/MM3 (ref 0.6–4.2)
LYMPHOCYTES NFR BLD AUTO: 19.4 % (ref 10–50)
MAGNESIUM SERPL-MCNC: 2 MG/DL (ref 1.6–2.3)
MCH RBC QN AUTO: 31.4 PG (ref 26.5–34)
MCHC RBC AUTO-ENTMCNC: 34.8 G/DL (ref 31.4–36)
MCV RBC AUTO: 90.3 FL (ref 80–98)
MONOCYTES # BLD AUTO: 0.45 10*3/MM3 (ref 0–0.9)
MONOCYTES NFR BLD AUTO: 5.1 % (ref 0–12)
NEUTROPHILS # BLD AUTO: 6.53 10*3/MM3 (ref 2–8.6)
NEUTROPHILS NFR BLD AUTO: 74.2 % (ref 37–80)
PLATELET # BLD AUTO: 327 10*3/MM3 (ref 150–450)
PMV BLD AUTO: 9.5 FL (ref 8–12)
POTASSIUM BLD-SCNC: 3.9 MMOL/L (ref 3.5–5.1)
PROT SERPL-MCNC: 7.7 G/DL (ref 6.3–8.6)
RBC # BLD AUTO: 5.16 10*6/MM3 (ref 3.77–5.16)
SODIUM BLD-SCNC: 140 MMOL/L (ref 137–145)
TSH SERPL DL<=0.05 MIU/L-ACNC: 4.31 MIU/ML (ref 0.46–4.68)
VIT B12 BLD-MCNC: 341 PG/ML (ref 239–931)
WBC NRBC COR # BLD: 8.81 10*3/MM3 (ref 3.2–9.8)

## 2018-01-10 PROCEDURE — 85025 COMPLETE CBC W/AUTO DIFF WBC: CPT | Performed by: INTERNAL MEDICINE

## 2018-01-10 PROCEDURE — 80053 COMPREHEN METABOLIC PANEL: CPT | Performed by: INTERNAL MEDICINE

## 2018-01-10 PROCEDURE — 83735 ASSAY OF MAGNESIUM: CPT | Performed by: INTERNAL MEDICINE

## 2018-01-10 PROCEDURE — 82306 VITAMIN D 25 HYDROXY: CPT | Performed by: INTERNAL MEDICINE

## 2018-01-10 PROCEDURE — 84443 ASSAY THYROID STIM HORMONE: CPT | Performed by: INTERNAL MEDICINE

## 2018-01-10 PROCEDURE — 36415 COLL VENOUS BLD VENIPUNCTURE: CPT | Performed by: INTERNAL MEDICINE

## 2018-01-10 PROCEDURE — 82607 VITAMIN B-12: CPT | Performed by: INTERNAL MEDICINE

## 2018-01-11 ENCOUNTER — HOSPITAL ENCOUNTER (OUTPATIENT)
Dept: PHYSICAL THERAPY | Facility: HOSPITAL | Age: 73
Setting detail: THERAPIES SERIES
Discharge: HOME | End: 2018-01-11
Attending: ORTHOPAEDIC SURGERY

## 2018-01-11 DIAGNOSIS — M75.102 ROTATOR CUFF SYNDROME OF LEFT SHOULDER: ICD-10-CM

## 2018-01-11 DIAGNOSIS — M75.42 SHOULDER IMPINGEMENT SYNDROME, LEFT: ICD-10-CM

## 2018-01-11 DIAGNOSIS — M25.512 LEFT SHOULDER PAIN, UNSPECIFIED CHRONICITY: Primary | ICD-10-CM

## 2018-01-11 PROCEDURE — G0283 ELEC STIM OTHER THAN WOUND: HCPCS

## 2018-01-11 PROCEDURE — 97110 THERAPEUTIC EXERCISES: CPT

## 2018-01-11 PROCEDURE — 97140 MANUAL THERAPY 1/> REGIONS: CPT

## 2018-01-11 NOTE — THERAPY TREATMENT NOTE
Outpatient Physical Therapy Ortho Treatment Note  [facility]     Patient Name: Akiko Blackwell  : 1945  MRN: [id number]  Today's Date: 2018      Visit Date: 2018     Subjective Improvement: 0%  Attendance:  3/3 (medicare)  Next MD Visit : 2-3 weeks  Recert Date:  18      Therapy Diagnosis:  Left Shoulder Pain         Visit Dx:    ICD-10-CM ICD-9-CM   1. Left shoulder pain, unspecified chronicity M25.512 719.41   2. Rotator cuff syndrome of left shoulder M75.102 726.10   3. Shoulder impingement syndrome, left M75.42 726.2       Patient Active Problem List   Diagnosis   • Hypothyroidism due to Hashimoto's thyroiditis   • Vitamin D deficiency   • B12 deficiency   • Thyroid nodule   • Osteopenia   • FH: ovarian cancer in first degree relative   • Esophagitis   • Atrophic vaginitis   • Allergic rhinitis   • Diverticular disease of colon   • Primary osteoarthritis of both knees   • Family history of malignant neoplasm of gastrointestinal tract        Past Medical History:   Diagnosis Date   • Acute bronchitis, unspecified    • Acute laryngitis    • Allergic rhinitis    • Atrophic vaginitis    • Diverticular disease of colon    • Encounter for gynecological examination (general) (routine) without abnormal findings    • Encounter for screening for malignant neoplasm of colon    • Encounter for screening for osteoporosis    • Esophagitis    • Family history of malignant neoplasm of gastrointestinal tract    • GERD (gastroesophageal reflux disease)    • History of bone density study 2016    DXA BONE DENSITY AXIAL [address]308 [facility] (2)    • History of screening mammography 2016   • Osteopenia    • Pain in right knee    • Subclinical hypothyroidism    • Thyroid nodule    • Viral upper respiratory tract infection    • Vitamin D deficiency         Past Surgical History:   Procedure Laterality Date   • BREAST BIOPSY      BX BREAST PERCUT W/O IMAGE 80346 (1)   x2   • CERVIX SURGERY   05/15/1973    Conization biopsy of cervix. Cervical cautery.   • COLONOSCOPY  06/20/2016    Diverticulosis in the sigmoid colon and in the descending colon.External and internal hemorrhoids.No specimens collected.   • DILATATION AND CURETTAGE  10/10/1990    Fractional   • ENDOSCOPY W/ PEG TUBE PLACEMENT  06/20/2012    Esophagitis seen. Biopsy taken. Gastritis in stomach. Biopsy taken. Hiatus hernia in stomach. Normal duodenum. Biopsy taken.   • EXCISION LESION  05/18/1967    Excision of inclusion cyst, vagina.   • INJECTION OF MEDICATION  05/04/2016    Kenalog (4)      • PAP SMEAR  07/22/2010    Mild inflammation present. Negative   • TONSILLECTOMY  01/15/1964    Recurring acute tonsillitis.             PT Ortho       01/11/18 1430    Precautions and Contraindications    Precautions/Limitations no known precautions/limitations  -    Contraindications MRI shows spurs/arthritis  -    Posture/Observations    Posture/Observations Comments AAROM flex 120  -      User Key  (r) = Recorded By, (t) = Taken By, (c) = Cosigned By    Initials Name Provider Type    CHECO Del Cid PTA Physical Therapy Assistant                            PT Assessment/Plan       01/11/18 1430       PT Assessment    Assessment Comments decreased tenderness in the left shoulder muscles.  Still guarded at end range but is improving.   -     PT Plan    PT Frequency 2x/week  -     Predicted Duration of Therapy Intervention (days/wks) 4 weeks  -     PT Plan Comments Continue to increaase at pt tolerates.  manual as beneficial.   -       User Key  (r) = Recorded By, (t) = Taken By, (c) = Cosigned By    Initials Name Provider Type    CHECO Del Cid PTA Physical Therapy Assistant                Modalities       01/11/18 1430          Ice    Location Left shoulder w/ IFC 20 min  -      Rx Minutes Other:  -      Ice S/P Rx Yes  -      ELECTRICAL STIMULATION    Attended/Unattended Unattended  -      Stimulation Type IFC  -       "Location/Electrode Placement/Other Left Shoulder with ICE  -KH      Rx Minutes 20 mins  -KH        User Key  (r) = Recorded By, (t) = Taken By, (c) = Cosigned By    Initials Name Provider Type    CHECO Del Cid PTA Physical Therapy Assistant                Exercises       01/11/18 1430          Subjective Comments    Subjective Comments Really think the estim and the massage helped last visit.  Exercises are making her right arm sore too. Was a little sore after last visit but not bad.   -      Subjective Pain    Able to rate subjective pain? yes  -      Pre-Treatment Pain Level 2  -KH      Post-Treatment Pain Level 2  -KH      Exercise 1    Exercise Name 1 pulley's flex/abd  -KH      Reps 1 20  -KH      Exercise 2    Exercise Name 2 scap squeezes  -KH      Reps 2 30  -KH      Exercise 3    Exercise Name 3 shoulder shrugs  -      Reps 3 30  -KH      Exercise 4    Exercise Name 4 No Money's  -KH      Reps 4 20  -KH      Exercise 5    Exercise Name 5 isometric 6 way  -KH      Sets 5 1  -KH      Reps 5 10  -KH      Time (Seconds) 5 5\" holds  -KH      Exercise 6    Exercise Name 6 supine AAROM wand flex  -KH      Sets 6 2  -KH      Reps 6 10  -KH      Exercise 7    Exercise Name 7 PROM/Jt mobes/STM  -KH      Time (Minutes) 7 10'  -KH        User Key  (r) = Recorded By, (t) = Taken By, (c) = Cosigned By    Initials Name Provider Type    CHECO Del Cid PTA Physical Therapy Assistant                               PT OP Goals       01/11/18 1430       PT Short Term Goals    STG Date to Achieve 01/25/18  -     STG 1 Independent in HEP   -     STG 1 Progress Ongoing  -     STG 2 AROM shoulder flex 120 degrees or better   -     STG 2 Progress Progressing  -     STG 3 AROM shoulder abduction 120 degrees or better   -     STG 3 Progress Progressing  -     STG 4 ER AROM with arm at side 45 degrees or better   -     STG 4 Progress Progressing  -     STG 5 Shoulder MMT grossly 4+/5 or better   -     STG 5 " Progress Progressing  -CHECO     Time Calculation    PT Goal Re-Cert Due Date 01/25/18  -CHECO       User Key  (r) = Recorded By, (t) = Taken By, (c) = Cosigned By    Initials Name Provider Type    CHECO Del Cid PTA Physical Therapy Assistant                         Time Calculation:   Start Time: 1430  Stop Time: 1532  Time Calculation (min): 62 min  Total Timed Code Minutes- PT: 42 minute(s)    Therapy Charges for Today     Code Description Service Date Service Provider Modifiers Qty    42565597784 HC PT THER PROC EA 15 MIN 1/11/2018 Nevaeh Del Cid PTA GP 2    82427943791 HC PT THER SUPP EA 15 MIN 1/11/2018 Nevaeh Del Cid, REGULO GP 1    46973771223 HC PT ELECTRICAL STIM UNATTENDED 1/11/2018 Nevaeh Del Cid PTA  1    62877553952 HC PT MANUAL THERAPY EA 15 MIN 1/11/2018 Nevaeh Del Cid PTA GP 1                    Nevaeh Del Cid PTA  1/11/2018

## 2018-01-15 ENCOUNTER — APPOINTMENT (OUTPATIENT)
Dept: PHYSICAL THERAPY | Facility: HOSPITAL | Age: 73
End: 2018-01-15
Attending: ORTHOPAEDIC SURGERY

## 2018-01-17 ENCOUNTER — OFFICE VISIT (OUTPATIENT)
Dept: ENDOCRINOLOGY | Facility: CLINIC | Age: 73
End: 2018-01-17

## 2018-01-17 VITALS
SYSTOLIC BLOOD PRESSURE: 130 MMHG | WEIGHT: 171.6 LBS | HEIGHT: 66 IN | DIASTOLIC BLOOD PRESSURE: 70 MMHG | BODY MASS INDEX: 27.58 KG/M2

## 2018-01-17 DIAGNOSIS — D75.1 SECONDARY POLYCYTHEMIA: ICD-10-CM

## 2018-01-17 DIAGNOSIS — E06.3 HYPOTHYROIDISM DUE TO HASHIMOTO'S THYROIDITIS: Primary | ICD-10-CM

## 2018-01-17 DIAGNOSIS — E04.1 THYROID NODULE: ICD-10-CM

## 2018-01-17 DIAGNOSIS — E55.9 VITAMIN D DEFICIENCY: ICD-10-CM

## 2018-01-17 DIAGNOSIS — E03.8 HYPOTHYROIDISM DUE TO HASHIMOTO'S THYROIDITIS: Primary | ICD-10-CM

## 2018-01-17 DIAGNOSIS — E53.8 B12 DEFICIENCY: ICD-10-CM

## 2018-01-17 DIAGNOSIS — M85.80 OSTEOPENIA, UNSPECIFIED LOCATION: ICD-10-CM

## 2018-01-17 PROCEDURE — 99214 OFFICE O/P EST MOD 30 MIN: CPT | Performed by: INTERNAL MEDICINE

## 2018-01-17 RX ORDER — LEVOTHYROXINE SODIUM 88 UG/1
TABLET ORAL
Qty: 96 TABLET | Refills: 3 | Status: SHIPPED | OUTPATIENT
Start: 2018-01-17 | End: 2018-11-02 | Stop reason: SDUPTHER

## 2018-01-17 RX ORDER — ERGOCALCIFEROL 1.25 MG/1
50000 CAPSULE ORAL
Qty: 6 CAPSULE | Refills: 3 | Status: SHIPPED | OUTPATIENT
Start: 2018-01-17 | End: 2018-11-02 | Stop reason: SDUPTHER

## 2018-01-17 NOTE — PROGRESS NOTES
Akiko Blackwell is a 72 y.o. female who presents for  evaluation of   Chief Complaint   Patient presents with   • Hyperthyroidism     thyroid check         Primary Care / Referring Provider  Barbie Murray MD    followup     reason - hypothyroidism    duration - March 2013    severity - mild to moderate    quality/timing  - progressive decline in function     aggravating factors - none    alleviating factors - levothyroxine    symptoms - hair falling and brittle nails - improved    --    thyroid nodule  personal interpretation , pseudonodule    Feb 2015 compared to Nov 2016   my interpretation - pseudonodules   radiologist interpreation - slight increase in size from 9 mm to 1 cm of largest nodule             ---    Ostepenia    DXA 2016     nl vit D on 600 +     Since last appt , had jaw fracture,       Past Medical History:   Diagnosis Date   • Acute bronchitis, unspecified    • Acute laryngitis    • Allergic rhinitis    • Atrophic vaginitis    • Diverticular disease of colon    • Encounter for gynecological examination (general) (routine) without abnormal findings    • Encounter for screening for malignant neoplasm of colon    • Encounter for screening for osteoporosis    • Esophagitis    • Family history of malignant neoplasm of gastrointestinal tract    • GERD (gastroesophageal reflux disease)    • History of bone density study 04/29/2016    DXA BONE DENSITY AXIAL [address] [facility] (2)    • History of screening mammography 01/02/2016   • Osteopenia    • Pain in right knee    • Subclinical hypothyroidism    • Thyroid nodule    • Viral upper respiratory tract infection    • Vitamin D deficiency      Family History   Problem Relation Age of Onset   • Ovarian cancer Mother      Onset age 70-74 years.   • Cancer Brother      Colorectal Cancer, onset age 70-74 years.   • Diabetes Other      Social History   Substance Use Topics   • Smoking status: Former Smoker   • Smokeless tobacco: Never Used   •  Alcohol use No         Current Outpatient Prescriptions:   •  Calcium Carbonate-Vitamin D 600-200 MG-UNIT tablet, Take 1 tablet by mouth Daily., Disp: , Rfl:   •  esomeprazole (NEXIUM) 40 MG capsule, Take 1 capsule by mouth Every Morning Before Breakfast., Disp: 90 capsule, Rfl: 3  •  levothyroxine (SYNTHROID, LEVOTHROID) 88 MCG tablet, Take 1 tablet by mouth Daily., Disp: 90 tablet, Rfl: 3  •  nabumetone (RELAFEN) 750 MG tablet, Take 1 tablet by mouth 2 (Two) Times a Day As Needed for Mild Pain ., Disp: 60 tablet, Rfl: 0    Review of Systems    Review of Systems   Constitutional: Negative for activity change, appetite change, chills, diaphoresis, fatigue, fever and unexpected weight change.   HENT: Negative for congestion, drooling, ear discharge, ear pain, facial swelling, mouth sores, nosebleeds, postnasal drip, sinus pressure, sneezing, sore throat, tinnitus, trouble swallowing and voice change.    Eyes: Negative.  Negative for photophobia, pain, discharge, redness and itching.   Respiratory: Negative.  Negative for apnea, cough, choking, chest tightness, shortness of breath, wheezing and stridor.    Cardiovascular: Negative.  Negative for chest pain, palpitations and leg swelling.   Gastrointestinal: Negative.  Negative for abdominal distention, abdominal pain, constipation, diarrhea, nausea and vomiting.   Endocrine: Negative.  Negative for cold intolerance, heat intolerance, polydipsia, polyphagia and polyuria.   Genitourinary: Negative for difficulty urinating, dysuria, flank pain and frequency.   Musculoskeletal: Negative for arthralgias, back pain, gait problem, joint swelling, myalgias, neck pain and neck stiffness.   Skin: Negative for color change, pallor, rash and wound.   Allergic/Immunologic: Negative for environmental allergies, food allergies and immunocompromised state.   Neurological: Negative for dizziness, tremors, seizures, syncope, facial asymmetry, speech difficulty, weakness,  "light-headedness, numbness and headaches.   Hematological: Negative for adenopathy. Does not bruise/bleed easily.   Psychiatric/Behavioral: Negative for agitation, behavioral problems, confusion, decreased concentration, dysphoric mood, hallucinations, self-injury, sleep disturbance and suicidal ideas. The patient is not nervous/anxious and is not hyperactive.         Objective:     /70  Ht 167.6 cm (66\")  Wt 77.8 kg (171 lb 9.6 oz)  LMP  (LMP Unknown)  BMI 27.7 kg/m2    Physical Exam   Constitutional: She is oriented to person, place, and time. She appears well-developed.   HENT:   Head: Normocephalic.   Right Ear: External ear normal.   Left Ear: External ear normal.   Nose: Nose normal.   Eyes: Conjunctivae and EOM are normal. No scleral icterus.   Neck: Normal range of motion. Neck supple. No tracheal deviation present. No thyromegaly present.   Cardiovascular: Normal rate, regular rhythm, normal heart sounds and intact distal pulses.  Exam reveals no gallop and no friction rub.    No murmur heard.  Pulmonary/Chest: Effort normal and breath sounds normal. No stridor. No respiratory distress. She has no wheezes. She has no rales. She exhibits no tenderness.   Abdominal: Soft. Bowel sounds are normal. She exhibits no distension and no mass. There is no tenderness. There is no rebound and no guarding.   Musculoskeletal: Normal range of motion. She exhibits no tenderness or deformity.   Lymphadenopathy:     She has no cervical adenopathy.   Neurological: She is alert and oriented to person, place, and time. She displays normal reflexes. She exhibits normal muscle tone. Coordination normal.   Skin: No rash noted. No erythema. No pallor.   Psychiatric: She has a normal mood and affect. Her behavior is normal. Judgment and thought content normal.       Lab Review    Results for orders placed or performed in visit on 05/15/17   Comprehensive Metabolic Panel   Result Value Ref Range    Glucose 94 60 - 100 mg/dL "    BUN 14 7 - 21 mg/dL    Creatinine 0.75 0.50 - 1.00 mg/dL    Sodium 140 137 - 145 mmol/L    Potassium 3.9 3.5 - 5.1 mmol/L    Chloride 100 95 - 110 mmol/L    CO2 30.0 22.0 - 31.0 mmol/L    Calcium 10.1 8.4 - 10.2 mg/dL    Total Protein 7.7 6.3 - 8.6 g/dL    Albumin 4.60 3.40 - 4.80 g/dL    ALT (SGPT) 32 9 - 52 U/L    AST (SGOT) 33 14 - 36 U/L    Alkaline Phosphatase 125 38 - 126 U/L    Total Bilirubin 0.7 0.2 - 1.3 mg/dL    eGFR Non African Amer 76 39 - 90 mL/min/1.73    Globulin 3.1 2.3 - 3.5 gm/dL    A/G Ratio 1.5 1.1 - 1.8 g/dL    BUN/Creatinine Ratio 18.7 7.0 - 25.0    Anion Gap 10.0 5.0 - 15.0 mmol/L   Vitamin B12   Result Value Ref Range    Vitamin B-12 341 239 - 931 pg/mL   Vitamin D 25 Hydroxy   Result Value Ref Range    25 Hydroxy, Vitamin D 34.1 30.0 - 100.0 ng/ml   TSH   Result Value Ref Range    TSH 4.310 0.460 - 4.680 mIU/mL   Magnesium   Result Value Ref Range    Magnesium 2.0 1.6 - 2.3 mg/dL   CBC Auto Differential   Result Value Ref Range    WBC 8.81 3.20 - 9.80 10*3/mm3    RBC 5.16 3.77 - 5.16 10*6/mm3    Hemoglobin 16.2 (H) 12.0 - 15.5 g/dL    Hematocrit 46.6 (H) 35.0 - 45.0 %    MCV 90.3 80.0 - 98.0 fL    MCH 31.4 26.5 - 34.0 pg    MCHC 34.8 31.4 - 36.0 g/dL    RDW 12.9 11.5 - 14.5 %    RDW-SD 43.0 36.4 - 46.3 fl    MPV 9.5 8.0 - 12.0 fL    Platelets 327 150 - 450 10*3/mm3    Neutrophil % 74.2 37.0 - 80.0 %    Lymphocyte % 19.4 10.0 - 50.0 %    Monocyte % 5.1 0.0 - 12.0 %    Eosinophil % 0.9 0.0 - 7.0 %    Basophil % 0.2 0.0 - 2.0 %    Immature Grans % 0.2 0.0 - 0.5 %    Neutrophils, Absolute 6.53 2.00 - 8.60 10*3/mm3    Lymphocytes, Absolute 1.71 0.60 - 4.20 10*3/mm3    Monocytes, Absolute 0.45 0.00 - 0.90 10*3/mm3    Eosinophils, Absolute 0.08 0.00 - 0.70 10*3/mm3    Basophils, Absolute 0.02 0.00 - 0.20 10*3/mm3    Immature Grans, Absolute 0.02 0.00 - 0.02 10*3/mm3           Assessment/Plan       ICD-10-CM ICD-9-CM   1. Hypothyroidism due to Hashimoto's thyroiditis E03.8 244.8    E06.3 245.2    2. Vitamin D deficiency E55.9 268.9   3. B12 deficiency E53.8 266.2   4. Osteopenia, unspecified location M85.80 733.90   5. Thyroid nodule E04.1 241.0           Hypothyroidism    on Levothyroxine 88 mcgs , 6.5 tabs per week - one hour before supper or at bedtime    Please go back to daily - increase to 7.5    Also on nexium and ca + vit D - in a.m.     Lab Results   Component Value Date    TSH 4.310 01/10/2018    TSH 5.630 (H) 05/08/2017    TSH 4.18 11/08/2016               ---    vit D Def, Osteopenia    Lab Results   Component Value Date    EQRL79KU 34.1 01/10/2018    CZOZ46YM 29.4 (L) 05/08/2017    JNOO01VC 24.3 (L) 11/08/2016         DXA , April 2016    on 600 D + 400 Calcium    add extra 1000 u daily - stop     Add 50 th u every 2 weeks     ---    Pseudonodules last US from June 2015 compared to Nov 2016     Repeat and if stable that would be plast     --    on nexium  she rightufully requests for Mg assessment and nl     --    Sec polycythemia    reasses    If persistent do sleep study       I reviewed and summarized records from Barbie Murray MD from 2016 and I reviewed / ordered labs.     No orders of the defined types were placed in this encounter.        A copy of my note was sent to Barbie Murray MD    Please see my above opinion and suggestions.

## 2018-01-18 ENCOUNTER — HOSPITAL ENCOUNTER (OUTPATIENT)
Dept: PHYSICAL THERAPY | Facility: HOSPITAL | Age: 73
Setting detail: THERAPIES SERIES
Discharge: HOME | End: 2018-01-18
Attending: ORTHOPAEDIC SURGERY

## 2018-01-18 DIAGNOSIS — M25.512 LEFT SHOULDER PAIN, UNSPECIFIED CHRONICITY: Primary | ICD-10-CM

## 2018-01-18 DIAGNOSIS — M75.102 ROTATOR CUFF SYNDROME OF LEFT SHOULDER: ICD-10-CM

## 2018-01-18 DIAGNOSIS — M75.42 SHOULDER IMPINGEMENT SYNDROME, LEFT: ICD-10-CM

## 2018-01-18 PROCEDURE — G0283 ELEC STIM OTHER THAN WOUND: HCPCS

## 2018-01-18 PROCEDURE — 97110 THERAPEUTIC EXERCISES: CPT

## 2018-01-18 NOTE — THERAPY TREATMENT NOTE
Outpatient Physical Therapy Ortho Treatment Note  [facility]     Patient Name: Akiko Blackwell  : 1945  MRN: [id number]  Today's Date: 2018      Visit Date: 2018     Subjective Improvement: 20%  Attendance:   (medicare)  Next MD Visit :18  Recert Date:  18      Therapy Diagnosis:  Left Shoulder Pain         Visit Dx:    ICD-10-CM ICD-9-CM   1. Left shoulder pain, unspecified chronicity M25.512 719.41   2. Rotator cuff syndrome of left shoulder M75.102 726.10   3. Shoulder impingement syndrome, left M75.42 726.2       Patient Active Problem List   Diagnosis   • Hypothyroidism due to Hashimoto's thyroiditis   • Vitamin D deficiency   • B12 deficiency   • Thyroid nodule   • Osteopenia   • FH: ovarian cancer in first degree relative   • Esophagitis   • Atrophic vaginitis   • Allergic rhinitis   • Diverticular disease of colon   • Primary osteoarthritis of both knees   • Family history of malignant neoplasm of gastrointestinal tract        Past Medical History:   Diagnosis Date   • Acute bronchitis, unspecified    • Acute laryngitis    • Allergic rhinitis    • Atrophic vaginitis    • Diverticular disease of colon    • Encounter for gynecological examination (general) (routine) without abnormal findings    • Encounter for screening for malignant neoplasm of colon    • Encounter for screening for osteoporosis    • Esophagitis    • Family history of malignant neoplasm of gastrointestinal tract    • GERD (gastroesophageal reflux disease)    • History of bone density study 2016    DXA BONE DENSITY AXIAL [address] [facility] (2)    • History of screening mammography 2016   • Osteopenia    • Pain in right knee    • Subclinical hypothyroidism    • Thyroid nodule    • Viral upper respiratory tract infection    • Vitamin D deficiency         Past Surgical History:   Procedure Laterality Date   • BREAST BIOPSY      BX BREAST PERCUT W/O IMAGE 72482 (1)   x2   • CERVIX SURGERY   05/15/1973    Conization biopsy of cervix. Cervical cautery.   • COLONOSCOPY  06/20/2016    Diverticulosis in the sigmoid colon and in the descending colon.External and internal hemorrhoids.No specimens collected.   • DILATATION AND CURETTAGE  10/10/1990    Fractional   • ENDOSCOPY W/ PEG TUBE PLACEMENT  06/20/2012    Esophagitis seen. Biopsy taken. Gastritis in stomach. Biopsy taken. Hiatus hernia in stomach. Normal duodenum. Biopsy taken.   • EXCISION LESION  05/18/1967    Excision of inclusion cyst, vagina.   • INJECTION OF MEDICATION  05/04/2016    Kenalog (4)      • PAP SMEAR  07/22/2010    Mild inflammation present. Negative   • TONSILLECTOMY  01/15/1964    Recurring acute tonsillitis.             PT Ortho       01/18/18 1420    Precautions and Contraindications    Precautions/Limitations no known precautions/limitations  -    Contraindications MRI shows spurs/arthritis  -    Posture/Observations    Posture/Observations Comments PROM flexion 135; Abd 120; ER to 15  -      User Key  (r) = Recorded By, (t) = Taken By, (c) = Cosigned By    Initials Name Provider Type    CHECO Del Cid PTA Physical Therapy Assistant                            PT Assessment/Plan       01/18/18 1420       PT Assessment    Assessment Comments continues to have increased pain with PROM at end range.  ER being the tightest.  Added tband exercises to HEP  -     PT Plan    PT Frequency 2x/week  -     Predicted Duration of Therapy Intervention (days/wks) 4 weeks  -     PT Plan Comments Recheck scheduled for next week; Increase  as pt tolerates.   -       User Key  (r) = Recorded By, (t) = Taken By, (c) = Cosigned By    Initials Name Provider Type    CHECO Del Cid PTA Physical Therapy Assistant                Modalities       01/18/18 1420          Subjective Pain    Post-Treatment Pain Level 2  -      Ice    Ice Applied Yes  -      Location Left shoulder w/ IFC 20 min  -KH      Rx Minutes Other:  -      Ice S/P Rx Yes   -KH      ELECTRICAL STIMULATION    Attended/Unattended Unattended  -KH      Stimulation Type IFC  -KH      Location/Electrode Placement/Other Left Shoulder with ICE  -KH      Rx Minutes 20 mins  -KH        User Key  (r) = Recorded By, (t) = Taken By, (c) = Cosigned By    Initials Name Provider Type    CHECO Del Cid PTA Physical Therapy Assistant                Exercises       01/18/18 1420          Subjective Comments    Subjective Comments Was able to get shoulder high enough to wash her own hair yesterday since she couln't go to the [employer]. Beleives it is getting better  -KH      Subjective Pain    Able to rate subjective pain? yes  -KH      Pre-Treatment Pain Level 2  -KH      Post-Treatment Pain Level 2  -KH      Exercise 1    Exercise Name 1 pro ll UE ROM   -KH      Time (Minutes) 1 8'  -KH      Additional Comments level 1 seat 8  -KH      Exercise 2    Exercise Name 2 pulley's flexion  -KH      Reps 2 30  -KH      Exercise 3    Exercise Name 3 pulley's scaption  -KH      Reps 3 30  -KH      Exercise 4    Exercise Name 4 Tband high/Mid Rows  -KH      Reps 4 20  -KH      Additional Comments Red  -KH      Exercise 5    Exercise Name 5 Tband Lat pulls  -KH      Sets 5 2  -KH      Reps 5 10  -KH      Additional Comments Red  -KH      Exercise 6    Exercise Name 6 supine AAROM shoulder flexion  -KH      Sets 6 2  -KH      Reps 6 10  -KH      Additional Comments 2#  -KH      Exercise 7    Exercise Name 7 supine AAROM wand chest press   -KH      Sets 7 2  -KH      Reps 7 10  -KH      Additional Comments 2#  -KH      Exercise 8    Exercise Name 8 STM/PROM to left shoulder  -KH      Time (Minutes) 8 10'  -KH      Additional Comments w/ jt mobes/scap mobes  -KH        User Key  (r) = Recorded By, (t) = Taken By, (c) = Cosigned By    Initials Name Provider Type    CHECO Del Cid PTA Physical Therapy Assistant                               PT OP Goals       01/18/18 1420       PT Short Term Goals    STG Date to  Achieve 01/25/18  -     STG 1 Independent in HEP   -     STG 1 Progress Ongoing  -     STG 2 AROM shoulder flex 120 degrees or better   -     STG 2 Progress Progressing  -     STG 3 AROM shoulder abduction 120 degrees or better   -     STG 3 Progress Progressing  -     STG 4 ER AROM with arm at side 45 degrees or better   -     STG 4 Progress Progressing  -     STG 5 Shoulder MMT grossly 4+/5 or better   -     STG 5 Progress Progressing  -     Time Calculation    PT Goal Re-Cert Due Date 01/25/18  -       User Key  (r) = Recorded By, (t) = Taken By, (c) = Cosigned By    Initials Name Provider Type    CHECO Del Cid PTA Physical Therapy Assistant                         Time Calculation:   Start Time: 1420  Stop Time: 1526  Time Calculation (min): 66 min  Total Timed Code Minutes- PT: 46 minute(s)    Therapy Charges for Today     Code Description Service Date Service Provider Modifiers Qty    27005890009 HC PT THER SUPP EA 15 MIN 1/18/2018 Nevaeh Del Cid PTA GP 1    56096078786 HC PT ELECTRICAL STIM UNATTENDED 1/18/2018 Nevaeh Del Cid PTA  1    94047084428 HC PT THER PROC EA 15 MIN 1/18/2018 Nevaeh Del Cid PTA GP 3                    Nevaeh Del Cid PTA  1/18/2018

## 2018-01-23 ENCOUNTER — APPOINTMENT (OUTPATIENT)
Dept: PHYSICAL THERAPY | Facility: HOSPITAL | Age: 73
End: 2018-01-23
Attending: ORTHOPAEDIC SURGERY

## 2018-01-24 ENCOUNTER — HOSPITAL ENCOUNTER (OUTPATIENT)
Dept: PHYSICAL THERAPY | Facility: HOSPITAL | Age: 73
Setting detail: THERAPIES SERIES
Discharge: HOME | End: 2018-01-24
Attending: ORTHOPAEDIC SURGERY

## 2018-01-24 DIAGNOSIS — M75.102 ROTATOR CUFF SYNDROME OF LEFT SHOULDER: ICD-10-CM

## 2018-01-24 DIAGNOSIS — M75.42 SHOULDER IMPINGEMENT SYNDROME, LEFT: ICD-10-CM

## 2018-01-24 DIAGNOSIS — M25.512 LEFT SHOULDER PAIN, UNSPECIFIED CHRONICITY: Primary | ICD-10-CM

## 2018-01-24 PROCEDURE — 97110 THERAPEUTIC EXERCISES: CPT | Performed by: PHYSICAL THERAPIST

## 2018-01-24 PROCEDURE — G8985 CARRY GOAL STATUS: HCPCS | Performed by: PHYSICAL THERAPIST

## 2018-01-24 PROCEDURE — G8984 CARRY CURRENT STATUS: HCPCS | Performed by: PHYSICAL THERAPIST

## 2018-01-24 NOTE — THERAPY PROGRESS REPORT/RE-CERT
Outpatient Physical Therapy Ortho Progress Note  [facility]     Patient Name: Akiko Blackwell  : 1945  MRN: [id number]  Today's Date: 2018      Visit Date: 2018  Attendance: 5/5  Subjective % Improvement: 20%  Recert Date: 2018  MD appointment: TBD    Therapy Diagnosis: Left shoulder pain    Patient Active Problem List   Diagnosis   • Hypothyroidism due to Hashimoto's thyroiditis   • Vitamin D deficiency   • B12 deficiency   • Thyroid nodule   • Osteopenia   • FH: ovarian cancer in first degree relative   • Esophagitis   • Atrophic vaginitis   • Allergic rhinitis   • Diverticular disease of colon   • Primary osteoarthritis of both knees   • Family history of malignant neoplasm of gastrointestinal tract        Past Medical History:   Diagnosis Date   • Acute bronchitis, unspecified    • Acute laryngitis    • Allergic rhinitis    • Atrophic vaginitis    • Diverticular disease of colon    • Encounter for gynecological examination (general) (routine) without abnormal findings    • Encounter for screening for malignant neoplasm of colon    • Encounter for screening for osteoporosis    • Esophagitis    • Family history of malignant neoplasm of gastrointestinal tract    • GERD (gastroesophageal reflux disease)    • History of bone density study 2016    DXA BONE DENSITY AXIAL [facility] (2)    • History of screening mammography 2016   • Osteopenia    • Pain in right knee    • Subclinical hypothyroidism    • Thyroid nodule    • Viral upper respiratory tract infection    • Vitamin D deficiency         Past Surgical History:   Procedure Laterality Date   • BREAST BIOPSY      BX BREAST PERCUT W/O IMAGE 16891 (1)   x2   • CERVIX SURGERY  05/15/1973    Conization biopsy of cervix. Cervical cautery.   • COLONOSCOPY  2016    Diverticulosis in the sigmoid colon and in the descending colon.External and internal hemorrhoids.No specimens collected.   • DILATATION AND  CURETTAGE  10/10/1990    Fractional   • ENDOSCOPY W/ PEG TUBE PLACEMENT  06/20/2012    Esophagitis seen. Biopsy taken. Gastritis in stomach. Biopsy taken. Hiatus hernia in stomach. Normal duodenum. Biopsy taken.   • EXCISION LESION  05/18/1967    Excision of inclusion cyst, vagina.   • INJECTION OF MEDICATION  05/04/2016    Kenalog (4)      • PAP SMEAR  07/22/2010    Mild inflammation present. Negative   • TONSILLECTOMY  01/15/1964    Recurring acute tonsillitis.       Visit Dx:     ICD-10-CM ICD-9-CM   1. Left shoulder pain, unspecified chronicity M25.512 719.41   2. Rotator cuff syndrome of left shoulder M75.102 726.10   3. Shoulder impingement syndrome, left M75.42 726.2                 PT Ortho       01/24/18 1432    Precautions and Contraindications    Precautions/Limitations no known precautions/limitations  -BB    Contraindications MRI shows spurs/arthritis  -BB    Subjective Pain    Able to rate subjective pain? yes  -BB    Pre-Treatment Pain Level 0  -BB    Post-Treatment Pain Level 0  -BB    Posture/Observations    Posture/Observations Comments No acute distress. Able to use BUE to push up from supine to sit.   -BB    Special Tests/Palpation    Special Tests/Palpation Shoulder   No significant tenderness noted today.   -BB    Shoulder Girdle Palpation    Long Head of Biceps Guarded/taut  -BB    Short Head of Biceps Guarded/taut  -BB    Shoulder Impingement/Rotator Cuff Special Tests    Drop Arm Test (Full Thickness RC Lesion) Left:;Negative  -BB    Hornblower Test (Teres Minor Lesion) Left:;Negative  -BB    ROM (Range of Motion)    General ROM Detail AROM flex:110, abd: 70, ER: 25 . AAROM pulleys flexion: 125, abd: 105  -BB    MMT (Manual Muscle Testing)    General MMT Assessment Detail right : 50lbs, left: 40lbs, shoulder MMT deferred due to lack of ROM.   -BB    Transfers    Transfer, Comment Independent   -BB      User Key  (r) = Recorded By, (t) = Taken By, (c) = Cosigned By    Initials Name  Provider Type    BETTY Olson, PT Physical Therapist                      Therapy Education  Education Details: No money, table slides   Given: HEP  Program: New  How Provided: Verbal (pictures )  Provided to: Patient  Level of Understanding: Verbalized, Demonstrated           PT OP Goals       01/24/18 1432       PT Short Term Goals    STG Date to Achieve 02/14/18  -BB     STG 1 Independent in HEP   -BB     STG 1 Progress Ongoing  -BB     STG 2 AROM shoulder flex 120 degrees or better   -BB     STG 2 Progress Progressing  -BB     STG 3 AROM shoulder abduction 120 degrees or better   -BB     STG 3 Progress Progressing  -BB     STG 4 ER AROM with arm at side 45 degrees or better   -BB     STG 4 Progress Progressing  -BB     STG 5 Shoulder MMT grossly 4+/5 or better   -BB     STG 5 Progress Progressing  -BB     Time Calculation    PT Goal Re-Cert Due Date 02/14/18  -BB       User Key  (r) = Recorded By, (t) = Taken By, (c) = Cosigned By    Initials Name Provider Type    BETTY Olson, PT Physical Therapist                PT Assessment/Plan       01/24/18 1432       PT Assessment    Functional Limitations Limitations in functional capacity and performance;Limitation in home management;Performance in self-care ADL  -BB     Impairments Joint mobility;Muscle strength;Pain;Range of motion  -BB     Assessment Comments Patient presents today with slight improvements with AROM. Patients palpation tenderness is improved as well. Patient continues to lack significant AROM limiting MMT.  strength is improving as well. Patient could continue to benefit from skilled PT services for stretching, manual, ROM, strengthening and modalities PRN for pain   -BB     Rehab Potential Good  -BB     Patient/caregiver participated in establishment of treatment plan and goals Yes  -BB     Patient would benefit from skilled therapy intervention Yes  -BB     PT Plan    PT Frequency 2x/week  -BB     Predicted Duration of Therapy  "Intervention (days/wks) 4 weeks  -BB     Planned CPT's? PT RE-EVAL: 01593;PT THER PROC EA 15 MIN: 94475;PT MANUAL THERAPY EA 15 MIN: 79368;PT ELECTRICAL STIM UNATTEND: ;PT ULTRASOUND EA 15 MIN: 84857;PT THER SUPP EA 15 MIN  -BB     Physical Therapy Interventions (Optional Details) stretching;strengthening;ROM (Range of Motion);postural re-education;patient/family education;manual therapy techniques;modalities;joint mobilization;home exercise program;gross motor skills  -BB     PT Plan Comments Continue with POC. Progress strength and ROM as tolerated. Manual therapy and modalities as needed for improved tissue mobility.   -BB       User Key  (r) = Recorded By, (t) = Taken By, (c) = Cosigned By    Initials Name Provider Type    BETTY Olson, PT Physical Therapist                Modalities       01/24/18 1432          Ice    Patient reports will apply ice at home to involved area Yes   ice to go x2  -BB        User Key  (r) = Recorded By, (t) = Taken By, (c) = Cosigned By    Initials Name Provider Type    BETTY Olson, PT Physical Therapist              Exercises       01/24/18 1432          Subjective Comments    Subjective Comments Reports continuing to have trouble carrying a heavy purse on the left, dressing is painful, thinks sometimes its better and sometimes its not. Reports fixing hair and that was \"encouraging\". Notes that the manual work to her bicep seemed to help and enjoys modalities. Also notes since starting PT her right shoulder has started bothering her and it previously wasnt.   -BB      Subjective Pain    Able to rate subjective pain? yes  -BB      Pre-Treatment Pain Level 0  -BB      Post-Treatment Pain Level 0  -BB      Aquatics    Aquatics performed? No  -BB      Exercise 1    Exercise Name 1 Pro 2 Level 1 UE ROM  -BB      Time (Minutes) 1 10'  -BB      Exercise 2    Exercise Name 2 Recheck  -BB      Exercise 3    Exercise Name 3 Pulleys flex/abd   -BB      Sets 3 1  -BB      Reps 3 " 20  -BB      Exercise 4    Exercise Name 4 sitting table slides flexion   -BB      Sets 4 1  -BB      Reps 4 10  -BB      Exercise 5    Exercise Name 5 sitting table abduction slides   -BB      Sets 5 1  -BB      Reps 5 10  -BB      Additional Comments scaption   -BB      Exercise 6    Exercise Name 6 Serratus punch supine   -BB      Sets 6 2  -BB      Reps 6 10  -BB      Additional Comments Poor control with decent   -BB      Exercise 7    Exercise Name 7 SL ER   -BB      Sets 7 2  -BB      Reps 7 10  -BB        User Key  (r) = Recorded By, (t) = Taken By, (c) = Cosigned By    Initials Name Provider Type    BB Ivett Olson, PT Physical Therapist                        Outcome Measure Options: Quick DASH  Quick DASH  Open a tight or new jar.: Mild Difficulty  Do heavy household chores (e.g., wash walls, wash floors): Mild Difficulty  Carry a shopping bag or briefcase: Mild Difficulty  Wash your back: Moderate Difficulty  Use a knife to cut food: No Difficulty  Recreational activities in which you take some force or impact through your arm, should or hand (e.g. golf, hammering, tennis, etc.): Severe Difficulty  During the past week, to what extent has your arm, shoulder, or hand problem interfered with your normal social activites with family, friends, neighbors or groups?: Slightly  During the past week, were you limited in your work or other regular daily activities as a result of your arm, shoulder or hand problem?: Moderately Limited  Arm, Shoulder, or hand pain: Moderate  Tingling (pins and needles) in your arm, shoulder, or hand: None  During the past week, how much difficulty have you had sleeping because of the pain in your arm, shoulder or hand?: Moderate Difficiculty  Number of Questions Answered: 11  Quick DASH Score: 34.09         Time Calculation:   Start Time: 1432  Stop Time: 1515  Time Calculation (min): 43 min  Total Timed Code Minutes- PT: 40 minute(s)     Therapy Charges for Today     Code  Description Service Date Service Provider Modifiers Qty    07914306797 HC PT CARRY MOV HAND OBJ CURRENT 1/24/2018 Ivett Olson, PT GP, CK 1    36182288465 HC PT CARRY MOV HAND OBJ PROJECTED 1/24/2018 Ivett Olson PT GP, CJ 1    26369879548 HC PT THER PROC EA 15 MIN 1/24/2018 Ivett Olson, PT GP 3          PT G-Codes  PT Professional Judgement Used?: Yes  Outcome Measure Options: Quick DASH  Score: 34.09%  Functional Limitation: Carrying, moving and handling objects  Carrying, Moving and Handling Objects Current Status (): At least 40 percent but less than 60 percent impaired, limited or restricted  Carrying, Moving and Handling Objects Goal Status (): At least 20 percent but less than 40 percent impaired, limited or restricted         Ivett Olson, PT  1/24/2018

## 2018-01-25 ENCOUNTER — APPOINTMENT (OUTPATIENT)
Dept: PHYSICAL THERAPY | Facility: HOSPITAL | Age: 73
End: 2018-01-25
Attending: ORTHOPAEDIC SURGERY

## 2018-01-26 ENCOUNTER — HOSPITAL ENCOUNTER (OUTPATIENT)
Dept: PHYSICAL THERAPY | Facility: HOSPITAL | Age: 73
Setting detail: THERAPIES SERIES
Discharge: HOME | End: 2018-01-26
Attending: ORTHOPAEDIC SURGERY

## 2018-01-26 DIAGNOSIS — M75.42 SHOULDER IMPINGEMENT SYNDROME, LEFT: ICD-10-CM

## 2018-01-26 DIAGNOSIS — M25.512 LEFT SHOULDER PAIN, UNSPECIFIED CHRONICITY: Primary | ICD-10-CM

## 2018-01-26 DIAGNOSIS — M75.102 ROTATOR CUFF SYNDROME OF LEFT SHOULDER: ICD-10-CM

## 2018-01-26 PROCEDURE — 97110 THERAPEUTIC EXERCISES: CPT

## 2018-01-26 PROCEDURE — G0283 ELEC STIM OTHER THAN WOUND: HCPCS

## 2018-01-26 NOTE — THERAPY TREATMENT NOTE
Outpatient Physical Therapy Ortho Treatment Note  [facility]     Patient Name: Akiko Blackwell  : 1945  MRN: [id number]  Today's Date: 2018      Visit Date: 2018    Subjective Improvement: 20%  Attendance:   (medicare)  Next MD Visit : TBD  Recert Date:  18      Therapy Diagnosis:  Left Shoulder Pain       Visit Dx:    ICD-10-CM ICD-9-CM   1. Left shoulder pain, unspecified chronicity M25.512 719.41   2. Rotator cuff syndrome of left shoulder M75.102 726.10   3. Shoulder impingement syndrome, left M75.42 726.2       Patient Active Problem List   Diagnosis   • Hypothyroidism due to Hashimoto's thyroiditis   • Vitamin D deficiency   • B12 deficiency   • Thyroid nodule   • Osteopenia   • FH: ovarian cancer in first degree relative   • Esophagitis   • Atrophic vaginitis   • Allergic rhinitis   • Diverticular disease of colon   • Primary osteoarthritis of both knees   • Family history of malignant neoplasm of gastrointestinal tract        Past Medical History:   Diagnosis Date   • Acute bronchitis, unspecified    • Acute laryngitis    • Allergic rhinitis    • Atrophic vaginitis    • Diverticular disease of colon    • Encounter for gynecological examination (general) (routine) without abnormal findings    • Encounter for screening for malignant neoplasm of colon    • Encounter for screening for osteoporosis    • Esophagitis    • Family history of malignant neoplasm of gastrointestinal tract    • GERD (gastroesophageal reflux disease)    • History of bone density study 2016    DXA BONE DENSITY AXIAL [facility] [facility] (2)    • History of screening mammography 2016   • Osteopenia    • Pain in right knee    • Subclinical hypothyroidism    • Thyroid nodule    • Viral upper respiratory tract infection    • Vitamin D deficiency         Past Surgical History:   Procedure Laterality Date   • BREAST BIOPSY      BX BREAST PERCUT W/O IMAGE 63053 (1)   x2   • CERVIX SURGERY   05/15/1973    Conization biopsy of cervix. Cervical cautery.   • COLONOSCOPY  06/20/2016    Diverticulosis in the sigmoid colon and in the descending colon.External and internal hemorrhoids.No specimens collected.   • DILATATION AND CURETTAGE  10/10/1990    Fractional   • ENDOSCOPY W/ PEG TUBE PLACEMENT  06/20/2012    Esophagitis seen. Biopsy taken. Gastritis in stomach. Biopsy taken. Hiatus hernia in stomach. Normal duodenum. Biopsy taken.   • EXCISION LESION  05/18/1967    Excision of inclusion cyst, vagina.   • INJECTION OF MEDICATION  05/04/2016    Kenalog (4)      • PAP SMEAR  07/22/2010    Mild inflammation present. Negative   • TONSILLECTOMY  01/15/1964    Recurring acute tonsillitis.             PT Ortho       01/26/18 1315    Precautions and Contraindications    Precautions/Limitations no known precautions/limitations  -    Contraindications MRI shows spurs/arthritis  -KH    Posture/Observations    Posture/Observations Comments PROM shoulder flex 138, abd 135  -KH      01/24/18 1432    Precautions and Contraindications    Precautions/Limitations no known precautions/limitations  -BB    Contraindications MRI shows spurs/arthritis  -BB    Subjective Pain    Able to rate subjective pain? yes  -BB    Pre-Treatment Pain Level 0  -BB    Post-Treatment Pain Level 0  -BB    Posture/Observations    Posture/Observations Comments No acute distress. Able to use BUE to push up from supine to sit.   -BB    Special Tests/Palpation    Special Tests/Palpation Shoulder   No significant tenderness noted today.   -BB    Shoulder Girdle Palpation    Long Head of Biceps Guarded/taut  -BB    Short Head of Biceps Guarded/taut  -BB    Shoulder Impingement/Rotator Cuff Special Tests    Drop Arm Test (Full Thickness RC Lesion) Left:;Negative  -BB    Hornblower Test (Teres Minor Lesion) Left:;Negative  -BB    ROM (Range of Motion)    General ROM Detail AROM flex:110, abd: 70, ER: 25 . AAROM pulleys flexion: 125, abd: 105  -BB    MMT  (Manual Muscle Testing)    General MMT Assessment Detail right : 50lbs, left: 40lbs, shoulder MMT deferred due to lack of ROM.   -BB    Transfers    Transfer, Comment Independent   -BB      User Key  (r) = Recorded By, (t) = Taken By, (c) = Cosigned By    Initials Name Provider Type    BB Ivett Olson, PT Physical Therapist    CHECO Del Cid PTA Physical Therapy Assistant                            PT Assessment/Plan       01/26/18 1315       PT Assessment    Assessment Comments progressing well with AROM this date.  Improved ER with jt mobes  -     PT Plan    PT Frequency 2x/week  -     Predicted Duration of Therapy Intervention (days/wks) 4 weeks  -     PT Plan Comments Continue to increase as pt tolerates.  Tband shoulder 6 way   -       User Key  (r) = Recorded By, (t) = Taken By, (c) = Cosigned By    Initials Name Provider Type    CHECO Del Cid PTA Physical Therapy Assistant                Modalities       01/26/18 1315          Ice    Ice Applied Yes  -      Location Left shoulder w/ IFC 15 min  -KH      Rx Minutes 15 mins  -      Ice S/P Rx Yes  -KH      ELECTRICAL STIMULATION    Attended/Unattended Unattended  -      Stimulation Type IFC  -      Location/Electrode Placement/Other Left Shoulder with ICE  -KH      Rx Minutes 15 mins  -KH        User Key  (r) = Recorded By, (t) = Taken By, (c) = Cosigned By    Initials Name Provider Type    CHECO Del Cid PTA Physical Therapy Assistant                Exercises       01/26/18 1315          Subjective Comments    Subjective Comments Pt reports that she is not having any pain today.    -KH      Subjective Pain    Able to rate subjective pain? yes  -KH      Pre-Treatment Pain Level 0  -KH      Post-Treatment Pain Level 0  -KH      Exercise 1    Exercise Name 1 pro ll UE strength  -      Time (Minutes) 1 10'  -KH      Additional Comments level 2  -KH      Exercise 2    Exercise Name 2 table slides fwd, cw,ccw  -KH      Reps 2 20  -KH       Exercise 3    Exercise Name 3 supine serratus punches  -KH      Sets 3 2  -KH      Reps 3 10  -KH      Exercise 4    Exercise Name 4 supine serratus circles ccw/cw  -KH      Sets 4 2  -KH      Reps 4 10  -KH      Exercise 5    Exercise Name 5 supine AROM shoulder flex  -KH      Sets 5 1  -KH      Reps 5 10  -KH      Exercise 6    Exercise Name 6 SL ER  -KH      Sets 6 1  -KH      Reps 6 10  -KH      Exercise 7    Exercise Name 7 SL ABD  -KH      Sets 7 2  -KH      Reps 7 10  -KH      Exercise 8    Exercise Name 8 PROM to L Shoulder  -KH      Time (Minutes) 8 10'  -KH        User Key  (r) = Recorded By, (t) = Taken By, (c) = Cosigned By    Initials Name Provider Type    CHECO Del Cid PTA Physical Therapy Assistant                               PT OP Goals       01/26/18 1315       PT Short Term Goals    STG Date to Achieve 02/14/18  -     STG 1 Independent in HEP   -     STG 1 Progress Ongoing  -KH     STG 2 AROM shoulder flex 120 degrees or better   -KH     STG 2 Progress Progressing  -KH     STG 3 AROM shoulder abduction 120 degrees or better   -KH     STG 3 Progress Progressing  -KH     STG 4 ER AROM with arm at side 45 degrees or better   -     STG 4 Progress Progressing  -KH     STG 5 Shoulder MMT grossly 4+/5 or better   -KH     STG 5 Progress Progressing  -KH     Time Calculation    PT Goal Re-Cert Due Date 02/14/18  -       User Key  (r) = Recorded By, (t) = Taken By, (c) = Cosigned By    Initials Name Provider Type    CHECO Del Cid PTA Physical Therapy Assistant                         Time Calculation:   Start Time: 1315  Stop Time: 1420  Time Calculation (min): 65 min  Total Timed Code Minutes- PT: 45 minute(s)    Therapy Charges for Today     Code Description Service Date Service Provider Modifiers Qty    74376525659 HC PT THER SUPP EA 15 MIN 1/26/2018 Nevaeh Del Cid PTA GP 1    32187288057 HC PT ELECTRICAL STIM UNATTENDED 1/26/2018 Nevaeh Del Cid PTA  1    57185180850 HC PT THER PROC EA 15 MIN  1/26/2018 Nevaeh Del Cid, PTA GP 3                    Nevaeh Del Cid, PTA  1/26/2018

## 2018-01-29 ENCOUNTER — OFFICE VISIT (OUTPATIENT)
Dept: FAMILY MEDICINE CLINIC | Facility: CLINIC | Age: 73
End: 2018-01-29

## 2018-01-29 VITALS
SYSTOLIC BLOOD PRESSURE: 142 MMHG | BODY MASS INDEX: 27 KG/M2 | DIASTOLIC BLOOD PRESSURE: 100 MMHG | WEIGHT: 168 LBS | OXYGEN SATURATION: 98 % | HEART RATE: 90 BPM | HEIGHT: 66 IN

## 2018-01-29 DIAGNOSIS — K20.90 ESOPHAGITIS: Primary | ICD-10-CM

## 2018-01-29 DIAGNOSIS — Z80.41 FH: OVARIAN CANCER IN FIRST DEGREE RELATIVE: ICD-10-CM

## 2018-01-29 DIAGNOSIS — E66.3 OVERWEIGHT (BMI 25.0-29.9): ICD-10-CM

## 2018-01-29 DIAGNOSIS — R03.0 ELEVATED BLOOD PRESSURE READING IN OFFICE WITHOUT DIAGNOSIS OF HYPERTENSION: ICD-10-CM

## 2018-01-29 DIAGNOSIS — Z12.31 ENCOUNTER FOR SCREENING MAMMOGRAM FOR BREAST CANCER: ICD-10-CM

## 2018-01-29 PROCEDURE — 99214 OFFICE O/P EST MOD 30 MIN: CPT | Performed by: FAMILY MEDICINE

## 2018-01-29 RX ORDER — ESOMEPRAZOLE MAGNESIUM 40 MG/1
40 CAPSULE, DELAYED RELEASE ORAL
Qty: 90 CAPSULE | Refills: 3 | Status: SHIPPED | OUTPATIENT
Start: 2018-01-29 | End: 2018-03-02 | Stop reason: SDUPTHER

## 2018-01-30 ENCOUNTER — OFFICE VISIT (OUTPATIENT)
Dept: ORTHOPEDIC SURGERY | Facility: CLINIC | Age: 73
End: 2018-01-30

## 2018-01-30 ENCOUNTER — HOSPITAL ENCOUNTER (OUTPATIENT)
Dept: PHYSICAL THERAPY | Facility: HOSPITAL | Age: 73
Setting detail: THERAPIES SERIES
Discharge: HOME | End: 2018-01-30
Attending: ORTHOPAEDIC SURGERY

## 2018-01-30 VITALS — BODY MASS INDEX: 27.48 KG/M2 | WEIGHT: 171 LBS | HEIGHT: 66 IN

## 2018-01-30 DIAGNOSIS — M75.42 SHOULDER IMPINGEMENT SYNDROME, LEFT: ICD-10-CM

## 2018-01-30 DIAGNOSIS — M75.102 ROTATOR CUFF SYNDROME OF LEFT SHOULDER: ICD-10-CM

## 2018-01-30 DIAGNOSIS — M25.512 LEFT SHOULDER PAIN, UNSPECIFIED CHRONICITY: Primary | ICD-10-CM

## 2018-01-30 PROCEDURE — 97110 THERAPEUTIC EXERCISES: CPT | Performed by: PHYSICAL THERAPIST

## 2018-01-30 PROCEDURE — G0283 ELEC STIM OTHER THAN WOUND: HCPCS | Performed by: PHYSICAL THERAPIST

## 2018-01-30 PROCEDURE — 99213 OFFICE O/P EST LOW 20 MIN: CPT | Performed by: ORTHOPAEDIC SURGERY

## 2018-01-30 NOTE — THERAPY TREATMENT NOTE
"    Outpatient Physical Therapy Ortho Treatment Note  [facility]     Patient Name: Akiko Blackwell  : 1945  MRN: [id number]  Today's Date: 2018      Visit Date: 2018  Attendance: 7  Subjective % Improvement: \"maybe 30\"  Recert Date: 2018  MD appointment: 18 in PM (MD note sent with patient)    Therapy Diagnosis: Left shoulder pain    Visit Dx:    ICD-10-CM ICD-9-CM   1. Left shoulder pain, unspecified chronicity M25.512 719.41   2. Rotator cuff syndrome of left shoulder M75.102 726.10   3. Shoulder impingement syndrome, left M75.42 726.2       Patient Active Problem List   Diagnosis   • Hypothyroidism due to Hashimoto's thyroiditis   • Vitamin D deficiency   • B12 deficiency   • Thyroid nodule   • Osteopenia   • FH: ovarian cancer in first degree relative   • Esophagitis   • Atrophic vaginitis   • Allergic rhinitis   • Diverticular disease of colon   • Primary osteoarthritis of both knees   • Family history of malignant neoplasm of gastrointestinal tract        Past Medical History:   Diagnosis Date   • Acute bronchitis, unspecified    • Acute laryngitis    • Allergic rhinitis    • Atrophic vaginitis    • Diverticular disease of colon    • Encounter for gynecological examination (general) (routine) without abnormal findings    • Encounter for screening for malignant neoplasm of colon    • Encounter for screening for osteoporosis    • Esophagitis    • Family history of malignant neoplasm of gastrointestinal tract    • GERD (gastroesophageal reflux disease)    • History of bone density study 2016    DXA BONE DENSITY AXIAL [address]326 [facility] (2)    • History of screening mammography 2016   • Osteopenia    • Pain in right knee    • Subclinical hypothyroidism    • Thyroid nodule    • Viral upper respiratory tract infection    • Vitamin D deficiency         Past Surgical History:   Procedure Laterality Date   • BREAST BIOPSY      BX BREAST PERCUT W/O IMAGE  (1)   x2 " "  • CERVIX SURGERY  05/15/1973    Conization biopsy of cervix. Cervical cautery.   • COLONOSCOPY  06/20/2016    Diverticulosis in the sigmoid colon and in the descending colon.External and internal hemorrhoids.No specimens collected.   • DILATATION AND CURETTAGE  10/10/1990    Fractional   • ENDOSCOPY W/ PEG TUBE PLACEMENT  06/20/2012    Esophagitis seen. Biopsy taken. Gastritis in stomach. Biopsy taken. Hiatus hernia in stomach. Normal duodenum. Biopsy taken.   • EXCISION LESION  05/18/1967    Excision of inclusion cyst, vagina.   • INJECTION OF MEDICATION  05/04/2016    Kenalog (4)      • PAP SMEAR  07/22/2010    Mild inflammation present. Negative   • TONSILLECTOMY  01/15/1964    Recurring acute tonsillitis.             PT Ortho       01/30/18 1019    Precautions and Contraindications    Precautions/Limitations no known precautions/limitations  -BB    Contraindications MRI shows spurs/arthritis  -BB    Subjective Pain    Able to rate subjective pain? yes  -BB    Post-Treatment Pain Level 0   \"nothing not using it\"  -BB    ROM (Range of Motion)    General ROM Detail flex: 115, abd 92, ER 30, IR thumb to L4   -BB    MMT (Manual Muscle Testing)    General MMT Assessment Detail flex: 4-/5 in range, abd: 4-/5 in range, ER4/5, IR 4+/5   -BB      User Key  (r) = Recorded By, (t) = Taken By, (c) = Cosigned By    Initials Name Provider Type    BETTY Olson, PT Physical Therapist                            PT Assessment/Plan       01/30/18 1019       PT Assessment    Assessment Comments Patient tolerated treatment well today with out increased complaints of pain, exercises performed in painfree range. MD noted sealed in envelope and sent with patient to MD appointment.    Improved AROM noted from last measurements  -BB     Patient would benefit from skilled therapy intervention Yes  -BB     PT Plan    PT Frequency 2x/week  -BB     Predicted Duration of Therapy Intervention (days/wks) 4 weeks  -BB     PT Plan Comments " "continue to progress ROM and strength as tolerated.   -BB       User Key  (r) = Recorded By, (t) = Taken By, (c) = Cosigned By    Initials Name Provider Type    BETTY Olson PT Physical Therapist                Modalities       01/30/18 1019          Ice    Ice Applied Yes  -BB      Location left shoulder 20 ' with IFC   -BB      Rx Minutes --   20  -BB      Ice S/P Rx Yes  -BB      ELECTRICAL STIMULATION    Attended/Unattended Unattended  -BB      Stimulation Type IFC  -BB      Location/Electrode Placement/Other left shoulder   -BB      Rx Minutes 20 mins  -BB        User Key  (r) = Recorded By, (t) = Taken By, (c) = Cosigned By    Initials Name Provider Type    BETTY Olson PT Physical Therapist                Exercises       01/30/18 1019          Subjective Comments    Subjective Comments \"still not where i want it to be\" Reports being able to reach the seatbelt a little easier. Notes pain is worse at night   -BB      Subjective Pain    Able to rate subjective pain? yes  -BB      Pre-Treatment Pain Level 4  -BB      Post-Treatment Pain Level 0   \"nothing not using it\"  -BB      Exercise 1    Exercise Name 1 pro 2 UE strength, ROM   -BB      Time (Minutes) 1 10'  -BB      Additional Comments level 1   -BB      Exercise 2    Exercise Name 2 table slides fwd, cw,ccw  -BB      Reps 2 20  -BB      Exercise 3    Exercise Name 3 MD note with ROM and MMT   -BB      Exercise 4    Exercise Name 4 Mid row yellow tband   -BB      Sets 4 2  -BB      Reps 4 10  -BB      Exercise 5    Exercise Name 5 Lat pull down yellow tband   -BB      Sets 5 2  -BB      Reps 5 10  -BB      Exercise 6    Exercise Name 6 Shoulder ER/IR yellow tband   -BB      Sets 6 2  -BB      Reps 6 10  -BB      Additional Comments manual cues for proper performance.   -BB      Exercise 7    Exercise Name 7 shoulder add with tband   -BB      Sets 7 2  -BB      Reps 7 10  -BB      Exercise 8    Exercise Name 8 shoulder abd with tband yellow   -BB   "    Sets 8 2  -BB      Reps 8 10  -BB      Additional Comments up to 30 degrees   -BB      Exercise 9    Exercise Name 9 shoulder flexion yellow tband   -BB      Sets 9 2  -BB      Reps 9 10  -BB      Additional Comments to about 60 degrees   -BB      Exercise 10    Exercise Name 10 pulleys flexion   -BB      Sets 10 1  -BB      Reps 10 20  -BB        User Key  (r) = Recorded By, (t) = Taken By, (c) = Cosigned By    Initials Name Provider Type    BETTY Olson PT Physical Therapist                               PT OP Goals       01/30/18 1019       PT Short Term Goals    STG Date to Achieve 02/14/18  -BB     STG 1 Independent in HEP   -BB     STG 1 Progress Ongoing  -BB     STG 2 AROM shoulder flex 120 degrees or better   -BB     STG 2 Progress Progressing  -BB     STG 3 AROM shoulder abduction 120 degrees or better   -BB     STG 3 Progress Progressing  -BB     STG 4 ER AROM with arm at side 45 degrees or better   -BB     STG 4 Progress Progressing  -BB     STG 5 Shoulder MMT grossly 4+/5 or better   -BB     STG 5 Progress Progressing  -BB     Time Calculation    PT Goal Re-Cert Due Date 02/14/18  -BB       User Key  (r) = Recorded By, (t) = Taken By, (c) = Cosigned By    Initials Name Provider Type    BETTY Olson PT Physical Therapist          Therapy Education  Education Details: No HEP change              Time Calculation:   Start Time: 1019  Stop Time: 1123  Time Calculation (min): 64 min  Total Timed Code Minutes- PT: 44 minute(s)    Therapy Charges for Today     Code Description Service Date Service Provider Modifiers Qty    34488898798 HC PT THER PROC EA 15 MIN 1/30/2018 Ivett Olson PT GP 3    70640898154 HC PT ELECTRICAL STIM UNATTENDED 1/30/2018 Ivett Olson PT  1                    Ivett Olson PT  1/30/2018

## 2018-01-31 NOTE — PROGRESS NOTES
Subjective:     Akiko Blackwell is a 72 y.o. female   GERD  Paitent complains of heartburn. This has been associated with midespigastric pain and nocturnal burning.  She denies choking on food, cough, hematemesis and hoarseness. Symptoms have been present for 4 years. She denies dysphagia. She has not lost weight. She denies melena, hematochezia, hematemesis, and coffee ground emesis. Medical therapy in the past has included H2 antagonists and proton pump inhibitors.  Has been on Nexium 40 mg daily for over 5 years.  Patient tried to wean off Nexium and onto pepcid but was not successful. She wishes to remain on generic nexium.    Patient sustained a fall while she was on a boat and has left shoulder problems for which she is seeing Dr. Beard, Orthopedics.  She has been taking OTC NSAIDs.    Preventative:  Over the past 2 weeks, have you felt down, depressed, or hopeless?No   Over the past 2 weeks, have you felt little interest or pleasure in doing things?No  Clinical depression screening refused by patient.No     On osteoporosis therapy?No     Past Medical Hx:   Past Medical History:   Diagnosis Date   • Acute bronchitis, unspecified    • Acute laryngitis    • Allergic rhinitis    • Atrophic vaginitis    • Diverticular disease of colon    • Encounter for gynecological examination (general) (routine) without abnormal findings    • Encounter for screening for malignant neoplasm of colon    • Encounter for screening for osteoporosis    • Esophagitis    • Family history of malignant neoplasm of gastrointestinal tract    • GERD (gastroesophageal reflux disease)    • History of bone density study 04/29/2016    DXA BONE DENSITY AXIAL [address] [facility] (2)    • History of screening mammography 01/02/2016   • Osteopenia    • Pain in right knee    • Subclinical hypothyroidism    • Thyroid nodule    • Viral upper respiratory tract infection    • Vitamin D deficiency        Past Surgical Hx:  Past Surgical History:    Procedure Laterality Date   • BREAST BIOPSY      BX BREAST PERCUT W/O IMAGE 19100 (1)   x2   • CERVIX SURGERY  05/15/1973    Conization biopsy of cervix. Cervical cautery.   • COLONOSCOPY  06/20/2016    Diverticulosis in the sigmoid colon and in the descending colon.External and internal hemorrhoids.No specimens collected.   • DILATATION AND CURETTAGE  10/10/1990    Fractional   • ENDOSCOPY W/ PEG TUBE PLACEMENT  06/20/2012    Esophagitis seen. Biopsy taken. Gastritis in stomach. Biopsy taken. Hiatus hernia in stomach. Normal duodenum. Biopsy taken.   • EXCISION LESION  05/18/1967    Excision of inclusion cyst, vagina.   • INJECTION OF MEDICATION  05/04/2016    Kenalog (4)      • PAP SMEAR  07/22/2010    Mild inflammation present. Negative   • TONSILLECTOMY  01/15/1964    Recurring acute tonsillitis.       Health Maintenance:  Health Maintenance   Topic Date Due   • TDAP/TD VACCINES (1 - Tdap) 09/28/1963   • MAMMOGRAM  01/04/2018   • MEDICARE ANNUAL WELLNESS  03/16/2018   • DXA SCAN  04/29/2018   • COLONOSCOPY  06/20/2026   • INFLUENZA VACCINE  Completed   • PNEUMOCOCCAL VACCINES (65+ LOW/MEDIUM RISK)  Completed   • ZOSTER VACCINE  Completed       Current Meds:    Current Outpatient Prescriptions:   •  Calcium Carbonate-Vitamin D 600-200 MG-UNIT tablet, Take 1 tablet by mouth Daily., Disp: , Rfl:   •  esomeprazole (NEXIUM) 40 MG capsule, Take 1 capsule by mouth Every Morning Before Breakfast., Disp: 90 capsule, Rfl: 3  •  levothyroxine (SYNTHROID, LEVOTHROID) 88 MCG tablet, Take 1 tab daily Monday to Saturday and 1.5 tabs on Sunday, Disp: 96 tablet, Rfl: 3  •  vitamin D (ERGOCALCIFEROL) 36198 units capsule capsule, Take 1 capsule by mouth Every 14 (Fourteen) Days., Disp: 6 capsule, Rfl: 3    Allergies:  Review of patient's allergies indicates no known allergies.    Family Hx:  Family History   Problem Relation Age of Onset   • Ovarian cancer Mother      Onset age 70-74 years.   • Cancer Brother      Colorectal  "Cancer, onset age 70-74 years.   • Diabetes Other         Social History:  Social History     Social History   • Marital status:      Spouse name: N/A   • Number of children: N/A   • Years of education: N/A     Occupational History   • Not on file.     Social History Main Topics   • Smoking status: Former Smoker   • Smokeless tobacco: Never Used   • Alcohol use No   • Drug use: Not on file   • Sexual activity: Not on file     Other Topics Concern   • Not on file     Social History Narrative       Review of Systems  Review of Systems   Constitutional: Negative for activity change, appetite change, fatigue and fever.   HENT: Negative for ear pain and sore throat.    Eyes: Negative for pain and visual disturbance.   Respiratory: Negative for cough and shortness of breath.    Cardiovascular: Negative for chest pain and palpitations.   Gastrointestinal: Negative for abdominal pain and nausea.   Endocrine: Negative for cold intolerance and heat intolerance.   Genitourinary: Negative for difficulty urinating and dysuria.   Musculoskeletal: Positive for arthralgias. Negative for gait problem.   Skin: Negative for color change and rash.   Neurological: Negative for dizziness, weakness and headaches.   Hematological: Negative for adenopathy. Does not bruise/bleed easily.   Psychiatric/Behavioral: Negative for agitation, confusion and sleep disturbance.       Objective:     /100 (BP Location: Left arm, Cuff Size: Adult)  Pulse 90  Ht 167.6 cm (65.98\")  Wt 76.2 kg (168 lb)  LMP  (LMP Unknown)  SpO2 98%  BMI 27.13 kg/m2  Physical Exam   Constitutional: She is oriented to person, place, and time. She appears well-developed and well-nourished.   HENT:   Head: Normocephalic and atraumatic.   Right Ear: Hearing, tympanic membrane, external ear and ear canal normal.   Left Ear: Hearing, tympanic membrane, external ear and ear canal normal.   Nose: Nose normal.   Mouth/Throat: Oropharynx is clear and moist.   Eyes: " Conjunctivae and EOM are normal. Pupils are equal, round, and reactive to light.   Neck: Normal range of motion. Neck supple.   Cardiovascular: Normal rate, regular rhythm and normal heart sounds.    Pulmonary/Chest: Effort normal and breath sounds normal.   Abdominal: Soft. Bowel sounds are normal. She exhibits no distension. There is no tenderness.   Musculoskeletal: Normal range of motion.   Neurological: She is alert and oriented to person, place, and time.   Skin: Skin is warm and dry.   Psychiatric: She has a normal mood and affect. Her speech is normal and behavior is normal. Cognition and memory are normal.             Assessment:       1. Esophagitis    2. Encounter for screening mammogram for breast cancer    3. FH: ovarian cancer in first degree relative    4. Overweight (BMI 25.0-29.9)    5. Elevated blood pressure reading in office without diagnosis of hypertension         Plan:     1.  Rx changes: Continue nexium 40mg. Patient attempted to wean off medication previously and was not successful and does not want to go off medications. Discussed risks of long term PPI use.   2.  Follow up: 10 months   3. Continue follow up with Endocrinology.  4. Mammogram ordered.  CA-125 ordered as mother had ovarian cancer.   5. Patient has been taking NSAID OTC which can cause elevation of blood pressure. Patient will check her blood pressure daily and bring in log for review at the Clinic. If remains elevated, patient may need to start anti-hypertensive.       Goals Addressed             Most Recent    • Weight (lb) < 66 kg (145 lb 8.1 oz)   77.6 kg (171 lb) (1/30/2018)             Barriers to goals: None            Preventative:  Female Preventative: Colon cancer screening is up to date    [facility] Will [facility].    Patient is a non smoker.   does not drink  eat more fruits and vegetables, decrease soda or juice intake, increase water intake, increase physical activity,  plan meals, eat breakfast and have 3 meals a day discussed non-weight bearing exercises for knee    RISK SCORE: 3         This document has been electronically signed by Barbie Murray MD on January 31, 2018 11:10 AM

## 2018-02-01 ENCOUNTER — HOSPITAL ENCOUNTER (OUTPATIENT)
Dept: PHYSICAL THERAPY | Facility: HOSPITAL | Age: 73
Setting detail: THERAPIES SERIES
Discharge: HOME | End: 2018-02-01
Attending: ORTHOPAEDIC SURGERY

## 2018-02-01 DIAGNOSIS — M75.102 ROTATOR CUFF SYNDROME OF LEFT SHOULDER: ICD-10-CM

## 2018-02-01 DIAGNOSIS — M75.42 SHOULDER IMPINGEMENT SYNDROME, LEFT: ICD-10-CM

## 2018-02-01 DIAGNOSIS — M25.512 LEFT SHOULDER PAIN, UNSPECIFIED CHRONICITY: Primary | ICD-10-CM

## 2018-02-01 PROCEDURE — 97110 THERAPEUTIC EXERCISES: CPT | Performed by: PHYSICAL THERAPIST

## 2018-02-01 PROCEDURE — G0283 ELEC STIM OTHER THAN WOUND: HCPCS | Performed by: PHYSICAL THERAPIST

## 2018-02-01 NOTE — THERAPY TREATMENT NOTE
Outpatient Physical Therapy Ortho Treatment Note  [facility]     Patient Name: Akiko Blackwell  : 1945  MRN: [id number]  Today's Date: 2018      Visit Date: 2018  Attendance: 8/  Subjective % Improvement: 30%  Recert Date: 2018  MD appointment: 6 weeks    Therapy Diagnosis: Left shoulder pain    Visit Dx:    ICD-10-CM ICD-9-CM   1. Left shoulder pain, unspecified chronicity M25.512 719.41   2. Rotator cuff syndrome of left shoulder M75.102 726.10   3. Shoulder impingement syndrome, left M75.42 726.2       Patient Active Problem List   Diagnosis   • Hypothyroidism due to Hashimoto's thyroiditis   • Vitamin D deficiency   • B12 deficiency   • Thyroid nodule   • Osteopenia   • FH: ovarian cancer in first degree relative   • Esophagitis   • Atrophic vaginitis   • Allergic rhinitis   • Diverticular disease of colon   • Primary osteoarthritis of both knees   • Family history of malignant neoplasm of gastrointestinal tract        Past Medical History:   Diagnosis Date   • Acute bronchitis, unspecified    • Acute laryngitis    • Allergic rhinitis    • Atrophic vaginitis    • Diverticular disease of colon    • Encounter for gynecological examination (general) (routine) without abnormal findings    • Encounter for screening for malignant neoplasm of colon    • Encounter for screening for osteoporosis    • Esophagitis    • Family history of malignant neoplasm of gastrointestinal tract    • GERD (gastroesophageal reflux disease)    • History of bone density study 2016    DXA BONE DENSITY AXIAL [address] [facility] (2)    • History of screening mammography 2016   • Osteopenia    • Pain in right knee    • Subclinical hypothyroidism    • Thyroid nodule    • Viral upper respiratory tract infection    • Vitamin D deficiency         Past Surgical History:   Procedure Laterality Date   • BREAST BIOPSY      BX BREAST PERCUT W/O IMAGE 99377 (1)   x2   • CERVIX SURGERY  05/15/1973     "Conization biopsy of cervix. Cervical cautery.   • COLONOSCOPY  06/20/2016    Diverticulosis in the sigmoid colon and in the descending colon.External and internal hemorrhoids.No specimens collected.   • DILATATION AND CURETTAGE  10/10/1990    Fractional   • ENDOSCOPY W/ PEG TUBE PLACEMENT  06/20/2012    Esophagitis seen. Biopsy taken. Gastritis in stomach. Biopsy taken. Hiatus hernia in stomach. Normal duodenum. Biopsy taken.   • EXCISION LESION  05/18/1967    Excision of inclusion cyst, vagina.   • INJECTION OF MEDICATION  05/04/2016    Kenalog (4)      • PAP SMEAR  07/22/2010    Mild inflammation present. Negative   • TONSILLECTOMY  01/15/1964    Recurring acute tonsillitis.             PT Ortho       02/01/18 1430    Subjective Comments    Subjective Comments Reports being \"surprised\" by MD appointment. Reports MD wished for her to try HEP on own but she was hesistant so he instructed her to try 1x week. No other changes noted. Notes massage and IFC has assisted most with pain  -BB    Precautions and Contraindications    Precautions/Limitations no known precautions/limitations  -BB    Contraindications MRI shows spurs/arthritis  -BB    Subjective Pain    Able to rate subjective pain? yes  -BB    Pre-Treatment Pain Level 4   \"3-4\"  -BB    Posture/Observations    Posture/Observations Comments No acute distress. No MD note returned at this point.   -BB      01/30/18 1019    Precautions and Contraindications    Precautions/Limitations no known precautions/limitations  -BB    Contraindications MRI shows spurs/arthritis  -BB    Subjective Pain    Able to rate subjective pain? yes  -BB    Post-Treatment Pain Level 0   \"nothing not using it\"  -BB    ROM (Range of Motion)    General ROM Detail flex: 115, abd 92, ER 30, IR thumb to L4   -BB    MMT (Manual Muscle Testing)    General MMT Assessment Detail flex: 4-/5 in range, abd: 4-/5 in range, ER4/5, IR 4+/5   -BB      User Key  (r) = Recorded By, (t) = Taken By, (c) = " "Cosigned By    Initials Name Provider Type    BB Ivett Olson, PT Physical Therapist                            PT Assessment/Plan       02/01/18 1430       PT Assessment    Assessment Comments No complaints with therex today. Cues needed with exercises to avoid compensations. Will try 1x week and see how patient does.   -BB     Patient would benefit from skilled therapy intervention Yes  -BB     PT Plan    PT Frequency 1x/week  -BB     Predicted Duration of Therapy Intervention (days/wks) 4 weeks  -BB     PT Plan Comments Will decrease to 1x week per patient reporting MD noted to do 1x week.   -BB       User Key  (r) = Recorded By, (t) = Taken By, (c) = Cosigned By    Initials Name Provider Type    BB Ivett Olson, PT Physical Therapist                Modalities       02/01/18 1430          Ice    Ice Applied Yes  -BB      Location left shoulder with IFC   -BB      Rx Minutes 15 mins  -BB      Ice S/P Rx Yes  -BB      ELECTRICAL STIMULATION    Attended/Unattended Unattended  -BB      Stimulation Type IFC  -BB      Location/Electrode Placement/Other left shoulder   -BB      Rx Minutes 15 mins  -BB        User Key  (r) = Recorded By, (t) = Taken By, (c) = Cosigned By    Initials Name Provider Type    BB Ivett Olson, PT Physical Therapist                Exercises       02/01/18 1430          Subjective Comments    Subjective Comments Reports being \"surprised\" by MD appointment. Reports MD wished for her to try HEP on own but she was hesistant so he instructed her to try 1x week. No other changes noted. Notes massage and IFC has assisted most with pain  -BB      Subjective Pain    Able to rate subjective pain? yes  -BB      Pre-Treatment Pain Level 4   \"3-4\"  -BB      Post-Treatment Pain Level 2  -BB      Exercise 1    Exercise Name 1 pro 2 UE strength, ROM   -BB      Time (Minutes) 1 10'  -BB      Additional Comments level 1   -BB      Exercise 2    Exercise Name 2 pulleys for ROM flex/scap   -BB      Sets 2 1  " -BB      Reps 2 20  -BB      Additional Comments each   -BB      Exercise 3    Exercise Name 3 Lat pull down  -BB      Sets 3 2  -BB      Reps 3 10  -BB      Additional Comments cues for performance   -BB      Exercise 4    Exercise Name 4 Mid row yellow tband   -BB      Sets 4 2  -BB      Reps 4 10  -BB      Exercise 5    Exercise Name 5 Shouler ER/IR yellow tband   -BB      Sets 5 2  -BB      Reps 5 10  -BB      Exercise 6    Exercise Name 6 Shoulder flex/abd yellow tband   -BB      Sets 6 2  -BB      Reps 6 10  -BB      Exercise 7    Exercise Name 7 Fwd flexion ball taps    -BB      Sets 7 2  -BB      Reps 7 10  -BB      Exercise 8    Exercise Name 8 shoulder abd taps with small ball   -BB      Sets 8 2  -BB      Reps 8 10  -BB      Exercise 9    Exercise Name 9 see modalities   -BB        User Key  (r) = Recorded By, (t) = Taken By, (c) = Cosigned By    Initials Name Provider Type    BB Ivett Olson, PT Physical Therapist                               PT OP Goals       02/01/18 1430       PT Short Term Goals    STG Date to Achieve 02/14/18  -BB     STG 1 Independent in HEP   -BB     STG 1 Progress Ongoing  -BB     STG 2 AROM shoulder flex 120 degrees or better   -BB     STG 2 Progress Progressing  -BB     STG 3 AROM shoulder abduction 120 degrees or better   -BB     STG 3 Progress Progressing  -BB     STG 4 ER AROM with arm at side 45 degrees or better   -BB     STG 4 Progress Progressing  -BB     STG 5 Shoulder MMT grossly 4+/5 or better   -BB     STG 5 Progress Progressing  -BB     Time Calculation    PT Goal Re-Cert Due Date 02/14/18  -BB       User Key  (r) = Recorded By, (t) = Taken By, (c) = Cosigned By    Initials Name Provider Type    BB Ivett Olson, PT Physical Therapist          Therapy Education  Education Details: Shoulder flex/abd/ER/IR   Given: HEP  Program: New  How Provided: Verbal (pictures )  Provided to: Patient  Level of Understanding: Verbalized              Time Calculation:   Start  Time: 1430  Stop Time: 1525  Time Calculation (min): 55 min  Total Timed Code Minutes- PT: 40 minute(s)    Therapy Charges for Today     Code Description Service Date Service Provider Modifiers Qty    57981425576 HC PT THER PROC EA 15 MIN 2/1/2018 Ivett Olson, PT GP 3    38976423367 HC PT ELECTRICAL STIM UNATTENDED 2/1/2018 Ivett Olson, PT  1                    Ivett Olson, PT  2/1/2018

## 2018-02-06 ENCOUNTER — HOSPITAL ENCOUNTER (OUTPATIENT)
Dept: PHYSICAL THERAPY | Facility: HOSPITAL | Age: 73
Setting detail: THERAPIES SERIES
Discharge: HOME | End: 2018-02-06
Attending: ORTHOPAEDIC SURGERY

## 2018-02-06 DIAGNOSIS — M75.42 SHOULDER IMPINGEMENT SYNDROME, LEFT: ICD-10-CM

## 2018-02-06 DIAGNOSIS — M75.102 ROTATOR CUFF SYNDROME OF LEFT SHOULDER: ICD-10-CM

## 2018-02-06 DIAGNOSIS — M25.512 LEFT SHOULDER PAIN, UNSPECIFIED CHRONICITY: Primary | ICD-10-CM

## 2018-02-06 PROCEDURE — 97110 THERAPEUTIC EXERCISES: CPT

## 2018-02-06 PROCEDURE — G0283 ELEC STIM OTHER THAN WOUND: HCPCS

## 2018-02-06 NOTE — THERAPY TREATMENT NOTE
Outpatient Physical Therapy Ortho Treatment Note  [facility]     Patient Name: Akiko Blackwell  : 1945  MRN: [id number]  Today's Date: 2018      Visit Date: 2018     Attendance:  (Medicare)  Subjective % Improvement: 30%  Recert Date: 2018  MD appointment: 6 weeks     Therapy Diagnosis: Left shoulder pain    Visit Dx:    ICD-10-CM ICD-9-CM   1. Left shoulder pain, unspecified chronicity M25.512 719.41   2. Rotator cuff syndrome of left shoulder M75.102 726.10   3. Shoulder impingement syndrome, left M75.42 726.2       Patient Active Problem List   Diagnosis   • Hypothyroidism due to Hashimoto's thyroiditis   • Vitamin D deficiency   • B12 deficiency   • Thyroid nodule   • Osteopenia   • FH: ovarian cancer in first degree relative   • Esophagitis   • Atrophic vaginitis   • Allergic rhinitis   • Diverticular disease of colon   • Primary osteoarthritis of both knees   • Family history of malignant neoplasm of gastrointestinal tract        Past Medical History:   Diagnosis Date   • Acute bronchitis, unspecified    • Acute laryngitis    • Allergic rhinitis    • Atrophic vaginitis    • Breast cyst    • Diverticular disease of colon    • Encounter for gynecological examination (general) (routine) without abnormal findings    • Encounter for screening for malignant neoplasm of colon    • Encounter for screening for osteoporosis    • Esophagitis    • Family history of malignant neoplasm of gastrointestinal tract    • GERD (gastroesophageal reflux disease)    • History of bone density study 2016    DXA BONE DENSITY AXIAL [facility] (2)    • History of screening mammography 2016   • Osteopenia    • Pain in right knee    • Subclinical hypothyroidism    • Thyroid nodule    • Viral upper respiratory tract infection    • Vitamin D deficiency         Past Surgical History:   Procedure Laterality Date   • BREAST BIOPSY      BX BREAST PERCUT W/O IMAGE 94276 (1)   x2   • CERVIX  "SURGERY  05/15/1973    Conization biopsy of cervix. Cervical cautery.   • COLONOSCOPY  06/20/2016    Diverticulosis in the sigmoid colon and in the descending colon.External and internal hemorrhoids.No specimens collected.   • DILATATION AND CURETTAGE  10/10/1990    Fractional   • ENDOSCOPY W/ PEG TUBE PLACEMENT  06/20/2012    Esophagitis seen. Biopsy taken. Gastritis in stomach. Biopsy taken. Hiatus hernia in stomach. Normal duodenum. Biopsy taken.   • EXCISION LESION  05/18/1967    Excision of inclusion cyst, vagina.   • INJECTION OF MEDICATION  05/04/2016    Kenalog (4)      • PAP SMEAR  07/22/2010    Mild inflammation present. Negative   • TONSILLECTOMY  01/15/1964    Recurring acute tonsillitis.             PT Ortho       02/06/18 1300    Subjective Comments    Subjective Comments \"sometimes it feels like I am better and sometimes it feels like I am not getting any better at all\"  -    Precautions and Contraindications    Precautions/Limitations no known precautions/limitations  -    Contraindications MRI shows spurs/arthritis  -    Subjective Pain    Post-Treatment Pain Level 1  -    Posture/Observations    Posture/Observations Comments No acute distress; limited AROm on Left shoulder  -      User Key  (r) = Recorded By, (t) = Taken By, (c) = Cosigned By    Initials Name Provider Type    CHECO Del Cid, PTA Physical Therapy Assistant                            PT Assessment/Plan       02/06/18 1300       PT Assessment    Assessment Comments continues to be limited in ROM with ER mostly in left shoulder. IR to T12 this date. Good tolerance to treatment no increased pain.  MHP vs. ICE this date to help aid with ROM.   -     PT Plan    PT Frequency 1x/week  -     Predicted Duration of Therapy Intervention (days/wks) 4 weeks  -     PT Plan Comments monitor MHP vs. ICe with IFC next visit. Continue to increase as pt tolerates. Recheck scheduled for next week and increase HEP as able.   -       User " "Key  (r) = Recorded By, (t) = Taken By, (c) = Cosigned By    Initials Name Provider Type    CHECO Del Cid PTA Physical Therapy Assistant                Modalities       02/06/18 1300          Moist Heat    MH Applied Yes  -KH      Location Left shoulder w/ IFC  -KH      Rx Minutes 15 mins  -KH      MH S/P Rx Yes  -KH      Ice    Ice Applied --  -KH      Location --  -KH      Rx Minutes --  -KH      Ice S/P Rx --  -KH      ELECTRICAL STIMULATION    Attended/Unattended Unattended  -KH      Stimulation Type IFC  -KH      Location/Electrode Placement/Other left shoulder   -KH      Rx Minutes 15 mins  -KH        User Key  (r) = Recorded By, (t) = Taken By, (c) = Cosigned By    Initials Name Provider Type    CHECO Del Cid PTA Physical Therapy Assistant                Exercises       02/06/18 1300          Subjective Comments    Subjective Comments \"sometimes it feels like I am better and sometimes it feels like I am not getting any better at all\"  -KH      Subjective Pain    Able to rate subjective pain? yes  -KH      Pre-Treatment Pain Level 3  -KH      Post-Treatment Pain Level 1  -KH      Exercise 1    Exercise Name 1 pro ll UE strength  -KH      Time (Minutes) 1 10'  -KH      Additional Comments level 3  -KH      Exercise 2    Exercise Name 2 pulley's flex/abd  -KH      Reps 2 20  -KH      Exercise 3    Exercise Name 3 ball wall taps 5 points  -KH      Sets 3 1  -KH      Reps 3 10  -KH      Exercise 4    Exercise Name 4 CC High/Mid Rows  -KH      Sets 4 2  -KH      Reps 4 10  -KH      Exercise 5    Exercise Name 5 wedge wipes B flex with lift at end   -KH      Sets 5 2  -KH      Reps 5 10  -KH      Exercise 6    Exercise Name 6 AAROM wand ext   -KH      Sets 6 2  -KH      Reps 6 10  -KH      Exercise 7    Exercise Name 7 AAROM IR w/ wand behind back   -KH      Sets 7 2  -KH      Reps 7 10  -KH      Exercise 8    Exercise Name 8 doorway stretch  -KH      Sets 8 3  -KH      Time (Seconds) 8 30\"  -KH      Additional " Comments hands low  -KH      Exercise 9    Exercise Name 9 Jt mobes to left shoulder  -      Time (Minutes) 9 10'  -        User Key  (r) = Recorded By, (t) = Taken By, (c) = Cosigned By    Initials Name Provider Type    CHECO Del Cid PTA Physical Therapy Assistant                               PT OP Goals       02/06/18 1300       PT Short Term Goals    STG Date to Achieve 02/14/18  -     STG 1 Independent in HEP   -KH     STG 1 Progress Ongoing  -KH     STG 2 AROM shoulder flex 120 degrees or better   -KH     STG 2 Progress Progressing  -KH     STG 3 AROM shoulder abduction 120 degrees or better   -KH     STG 3 Progress Progressing  -KH     STG 4 ER AROM with arm at side 45 degrees or better   -KH     STG 4 Progress Progressing  -KH     STG 5 Shoulder MMT grossly 4+/5 or better   -KH     STG 5 Progress Progressing  -KH     Time Calculation    PT Goal Re-Cert Due Date 02/14/18  -       User Key  (r) = Recorded By, (t) = Taken By, (c) = Cosigned By    Initials Name Provider Type    CHECO Del Cid PTA Physical Therapy Assistant                         Time Calculation:   Start Time: 1300  Stop Time: 1400  Time Calculation (min): 60 min  Total Timed Code Minutes- PT: 45 minute(s)    Therapy Charges for Today     Code Description Service Date Service Provider Modifiers Qty    26539315591 HC PT THER SUPP EA 15 MIN 2/6/2018 Nevaeh Del Cid PTA GP 1    50809089603 HC PT ELECTRICAL STIM UNATTENDED 2/6/2018 Nevaeh Del Cid PTA  1    09149889582 HC PT THER PROC EA 15 MIN 2/6/2018 Nevaeh Del Cid PTA GP 3                    Nevaeh Del Cid PTA  2/6/2018

## 2018-02-08 ENCOUNTER — APPOINTMENT (OUTPATIENT)
Dept: PHYSICAL THERAPY | Facility: HOSPITAL | Age: 73
End: 2018-02-08
Attending: ORTHOPAEDIC SURGERY

## 2018-02-14 ENCOUNTER — HOSPITAL ENCOUNTER (OUTPATIENT)
Dept: PHYSICAL THERAPY | Facility: HOSPITAL | Age: 73
Setting detail: THERAPIES SERIES
Discharge: HOME | End: 2018-02-14
Attending: ORTHOPAEDIC SURGERY

## 2018-02-14 DIAGNOSIS — M25.512 LEFT SHOULDER PAIN, UNSPECIFIED CHRONICITY: Primary | ICD-10-CM

## 2018-02-14 DIAGNOSIS — M75.42 SHOULDER IMPINGEMENT SYNDROME, LEFT: ICD-10-CM

## 2018-02-14 DIAGNOSIS — M75.102 ROTATOR CUFF SYNDROME OF LEFT SHOULDER: ICD-10-CM

## 2018-02-14 PROCEDURE — 97110 THERAPEUTIC EXERCISES: CPT | Performed by: PHYSICAL THERAPIST

## 2018-02-14 PROCEDURE — G8984 CARRY CURRENT STATUS: HCPCS | Performed by: PHYSICAL THERAPIST

## 2018-02-14 PROCEDURE — G8985 CARRY GOAL STATUS: HCPCS | Performed by: PHYSICAL THERAPIST

## 2018-02-14 NOTE — THERAPY PROGRESS REPORT/RE-CERT
"    Outpatient Physical Therapy Ortho Progress Note  [facility]     Patient Name: Akiko Blackwell  : 1945  MRN: [id number]  Today's Date: 2018      Visit Date: 2018  Attendance: 10/10  Subjective % Improvement: 25% \"im definitely better\"  Recert Date: 3/8/2018   MD appointment: 2018    Therapy Diagnosis: Left Shoulder Pain    Patient Active Problem List   Diagnosis   • Hypothyroidism due to Hashimoto's thyroiditis   • Vitamin D deficiency   • B12 deficiency   • Thyroid nodule   • Osteopenia   • FH: ovarian cancer in first degree relative   • Esophagitis   • Atrophic vaginitis   • Allergic rhinitis   • Diverticular disease of colon   • Primary osteoarthritis of both knees   • Family history of malignant neoplasm of gastrointestinal tract        Past Medical History:   Diagnosis Date   • Acute bronchitis, unspecified    • Acute laryngitis    • Allergic rhinitis    • Atrophic vaginitis    • Breast cyst    • Diverticular disease of colon    • Encounter for gynecological examination (general) (routine) without abnormal findings    • Encounter for screening for malignant neoplasm of colon    • Encounter for screening for osteoporosis    • Esophagitis    • Family history of malignant neoplasm of gastrointestinal tract    • GERD (gastroesophageal reflux disease)    • History of bone density study 2016    DXA BONE DENSITY AXIAL [facility] (2)    • History of screening mammography 2016   • Osteopenia    • Pain in right knee    • Subclinical hypothyroidism    • Thyroid nodule    • Viral upper respiratory tract infection    • Vitamin D deficiency         Past Surgical History:   Procedure Laterality Date   • BREAST BIOPSY      BX BREAST PERCUT W/O IMAGE  (1)   x2   • CERVIX SURGERY  05/15/1973    Conization biopsy of cervix. Cervical cautery.   • COLONOSCOPY  2016    Diverticulosis in the sigmoid colon and in the descending colon.External and internal " hemorrhoids.No specimens collected.   • DILATATION AND CURETTAGE  10/10/1990    Fractional   • ENDOSCOPY W/ PEG TUBE PLACEMENT  06/20/2012    Esophagitis seen. Biopsy taken. Gastritis in stomach. Biopsy taken. Hiatus hernia in stomach. Normal duodenum. Biopsy taken.   • EXCISION LESION  05/18/1967    Excision of inclusion cyst, vagina.   • INJECTION OF MEDICATION  05/04/2016    Kenalog (4)      • PAP SMEAR  07/22/2010    Mild inflammation present. Negative   • TONSILLECTOMY  01/15/1964    Recurring acute tonsillitis.       Visit Dx:     ICD-10-CM ICD-9-CM   1. Left shoulder pain, unspecified chronicity M25.512 719.41   2. Rotator cuff syndrome of left shoulder M75.102 726.10   3. Shoulder impingement syndrome, left M75.42 726.2                 PT Ortho       02/14/18 1300    Precautions and Contraindications    Precautions/Limitations no known precautions/limitations  -BB    Contraindications MRI shows spurs/arthritis  -BB    Subjective Pain    Able to rate subjective pain? yes  -BB    Pre-Treatment Pain Level 0   no pain resting   -BB    Post-Treatment Pain Level 0  -BB    Posture/Observations    Posture/Observations Comments No acute distress. Increased trap activation with shoulder abduction as compensation   -BB    Shoulder Girdle Palpation    Shoulder Girdle Palpation? Yes   No significant tenderness noted today   -BB    Shoulder Impingement/Rotator Cuff Special Tests    Drop Arm Test (Full Thickness RC Lesion) Negative  -BB    Hornblower Test (Teres Minor Lesion) Negative  -BB    ROM (Range of Motion)    General ROM Detail AAROM flex: 125, AAROM abd 125. AROM flexion:115 abduction: 102 ER: 32 degrees, IR: thumb to L2 with most pain  -BB    MMT (Manual Muscle Testing)    General MMT Assessment Detail Shoulder abduction 4-/5, flexion: 4/5, ER/IR 4/5  -BB      User Key  (r) = Recorded By, (t) = Taken By, (c) = Cosigned By    Initials Name Provider Type    BB Ivett Olson, PT Physical Therapist                       Therapy Education  Education Details: No new HEP, POC discussed   Given: Other (comment)  Program: New  How Provided: Verbal  Provided to: Patient  Level of Understanding: Verbalized           PT OP Goals       02/14/18 1300       PT Short Term Goals    STG Date to Achieve 03/07/18  -BB     STG 1 Independent in HEP   -BB     STG 1 Progress Ongoing  -BB     STG 2 AROM shoulder flex 120 degrees or better   -BB     STG 2 Progress Progressing  -BB     STG 2 Progress Comments 115  -BB     STG 3 AROM shoulder abduction 120 degrees or better   -BB     STG 3 Progress Progressing  -BB     STG 3 Progress Comments 102  -BB     STG 4 ER AROM with arm at side 45 degrees or better   -BB     STG 4 Progress Progressing  -BB     STG 4 Progress Comments 32   -BB     STG 5 Shoulder MMT grossly 4+/5 or better   -BB     STG 5 Progress Progressing  -BB     Time Calculation    PT Goal Re-Cert Due Date 03/07/18  -BB       User Key  (r) = Recorded By, (t) = Taken By, (c) = Cosigned By    Initials Name Provider Type    BB Ivett Olson, PT Physical Therapist                PT Assessment/Plan       02/14/18 1300       PT Assessment    Functional Limitations Limitations in functional capacity and performance;Limitation in home management;Performance in self-care ADL  -BB     Impairments Joint mobility;Muscle strength;Pain;Range of motion  -BB     Assessment Comments Patient presents with continued AROM deficits, patients ROM has progressed and strength has progressed but remains functionally a deficit limiting daily tasks. Patient does not present with tenderness today of the musculature. Patient could continue to benefit from skilled PT for 3-4 weeks to progress strength and ROM of left arm.   -BB     Rehab Potential Good  -BB     Patient/caregiver participated in establishment of treatment plan and goals Yes  -BB     Patient would benefit from skilled therapy intervention Yes  -BB     PT Plan    PT Frequency 1x/week  -BB   "   Predicted Duration of Therapy Intervention (days/wks) 3-4 weeks   -BB     PT Plan Comments Possible joint mobes (Inferior and AP) to improve ROM deficits, continue strength, ROM, scapular stabilization.  -BB       User Key  (r) = Recorded By, (t) = Taken By, (c) = Cosigned By    Initials Name Provider Type    BETTY Olson, PT Physical Therapist                Modalities       02/14/18 1300          Ice    Patient reports will apply ice at home to involved area Yes   ice to go. \"i think i like the ice better\"  -BB        User Key  (r) = Recorded By, (t) = Taken By, (c) = Cosigned By    Initials Name Provider Type    BETTY Olson, PT Physical Therapist              Exercises       02/14/18 1300          Subjective Comments    Subjective Comments Patient reports not taking any medication for pain. Reports pain is mostly noted putting clothes on/off, reaching with arm. Reports sleeping with arm propped and has slept better since that. Reports \"im not in severe pain, its just different than im used to being\"  -BB      Subjective Pain    Able to rate subjective pain? yes  -BB      Pre-Treatment Pain Level 0   no pain resting   -BB      Post-Treatment Pain Level 0  -BB      Exercise 1    Exercise Name 1 pro ll UE strength  -BB      Time (Minutes) 1 10'  -BB      Additional Comments level 1  -BB      Exercise 2    Exercise Name 2 pulley's flex/abd  -BB      Reps 2 20  -BB      Exercise 3    Exercise Name 3 Recheck ROM and strength  -BB      Exercise 4    Exercise Name 4 Shoulder ER/IR red tband   -BB      Sets 4 2  -BB      Reps 4 10  -BB      Exercise 5    Exercise Name 5 Shoulder abduction yellow tband   -BB      Sets 5 2  -BB      Reps 5 10  -BB      Exercise 6    Exercise Name 6 Shoulder flexion yellow tband   -BB      Sets 6 2  -BB      Reps 6 10  -BB      Exercise 7    Exercise Name 7 CC high row 5 plates   -BB      Sets 7 2  -BB      Reps 7 10  -BB      Exercise 8    Exercise Name 8 AAROM with 1lb stick   " -BB      Sets 8 2  -BB      Reps 8 10  -BB      Exercise 9    Exercise Name 9 Shoulder abd 1lb to available range of about 75degrees   -BB      Sets 9 2  -BB      Reps 9 10  -BB      Additional Comments Fatigue set in and max range to about 50 degrees   -BB      Exercise 10    Exercise Name 10 Elbow flexion with slow eccentric lower   -BB      Sets 10 2  -BB      Reps 10 10  -BB      Additional Comments 2 lb   -BB        User Key  (r) = Recorded By, (t) = Taken By, (c) = Cosigned By    Initials Name Provider Type    BB Ivett Olson, PT Physical Therapist                        Outcome Measure Options: Quick DASH  Quick DASH  Open a tight or new jar.: Mild Difficulty  Do heavy household chores (e.g., wash walls, wash floors): Mild Difficulty  Carry a shopping bag or briefcase: Mild Difficulty  Wash your back: Moderate Difficulty  Use a knife to cut food: No Difficulty  Recreational activities in which you take some force or impact through your arm, should or hand (e.g. golf, hammering, tennis, etc.): Mild Difficulty  During the past week, to what extent has your arm, shoulder, or hand problem interfered with your normal social activites with family, friends, neighbors or groups?: Not at all  During the past week, were you limited in your work or other regular daily activities as a result of your arm, shoulder or hand problem?: Slightly Limited  Arm, Shoulder, or hand pain: Mild  Tingling (pins and needles) in your arm, shoulder, or hand: None  During the past week, how much difficulty have you had sleeping because of the pain in your arm, shoulder or hand?: Mild Difficulty  Number of Questions Answered: 11  Quick DASH Score: 20.45         Time Calculation:   Start Time: 1300  Stop Time: 1345  Time Calculation (min): 45 min  Total Timed Code Minutes- PT: 40 minute(s)     Therapy Charges for Today     Code Description Service Date Service Provider Modifiers Qty    80281436635 HC PT CARRY MOV HAND OBJ CURRENT 2/14/2018  Ivett Olson, PT GP, CK 1    03538928422 HC PT CARRY MOV HAND OBJ PROJECTED 2/14/2018 Ivett Olson PT GP, CJ 1    51052243030 HC PT THER PROC EA 15 MIN 2/14/2018 Ivett Olson PT GP 3          PT G-Codes  PT Professional Judgement Used?: Yes  Outcome Measure Options: Quick DASH  Score: 20.45%  Functional Limitation: Carrying, moving and handling objects  Carrying, Moving and Handling Objects Current Status (): At least 40 percent but less than 60 percent impaired, limited or restricted  Carrying, Moving and Handling Objects Goal Status (): At least 20 percent but less than 40 percent impaired, limited or restricted         Ivett Olson, PT  2/14/2018

## 2018-02-21 ENCOUNTER — HOSPITAL ENCOUNTER (OUTPATIENT)
Dept: PHYSICAL THERAPY | Facility: HOSPITAL | Age: 73
Setting detail: THERAPIES SERIES
Discharge: HOME | End: 2018-02-21
Attending: ORTHOPAEDIC SURGERY

## 2018-02-21 DIAGNOSIS — M75.42 SHOULDER IMPINGEMENT SYNDROME, LEFT: ICD-10-CM

## 2018-02-21 DIAGNOSIS — M25.512 LEFT SHOULDER PAIN, UNSPECIFIED CHRONICITY: Primary | ICD-10-CM

## 2018-02-21 DIAGNOSIS — M75.102 ROTATOR CUFF SYNDROME OF LEFT SHOULDER: ICD-10-CM

## 2018-02-21 PROCEDURE — G0283 ELEC STIM OTHER THAN WOUND: HCPCS

## 2018-02-21 PROCEDURE — 97110 THERAPEUTIC EXERCISES: CPT

## 2018-02-21 NOTE — THERAPY TREATMENT NOTE
Outpatient Physical Therapy Ortho Treatment Note  [facility]     Patient Name: Akiko Blackwell  : 1945  MRN: [id number]  Today's Date: 2018      Visit Date: 2018     Subjective Improvement 75%  Visits 11/14  Visits approved medicare cap  RTMD 3-  Recert 3-    Left Shoulder Pain    Visit Dx:    ICD-10-CM ICD-9-CM   1. Left shoulder pain, unspecified chronicity M25.512 719.41   2. Rotator cuff syndrome of left shoulder M75.102 726.10   3. Shoulder impingement syndrome, left M75.42 726.2       Patient Active Problem List   Diagnosis   • Hypothyroidism due to Hashimoto's thyroiditis   • Vitamin D deficiency   • B12 deficiency   • Thyroid nodule   • Osteopenia   • FH: ovarian cancer in first degree relative   • Esophagitis   • Atrophic vaginitis   • Allergic rhinitis   • Diverticular disease of colon   • Primary osteoarthritis of both knees   • Family history of malignant neoplasm of gastrointestinal tract        Past Medical History:   Diagnosis Date   • Acute bronchitis, unspecified    • Acute laryngitis    • Allergic rhinitis    • Atrophic vaginitis    • Breast cyst    • Diverticular disease of colon    • Encounter for gynecological examination (general) (routine) without abnormal findings    • Encounter for screening for malignant neoplasm of colon    • Encounter for screening for osteoporosis    • Esophagitis    • Family history of malignant neoplasm of gastrointestinal tract    • GERD (gastroesophageal reflux disease)    • History of bone density study 2016    DXA BONE DENSITY AXIAL [facility] (2)    • History of screening mammography 2016   • Osteopenia    • Pain in right knee    • Subclinical hypothyroidism    • Thyroid nodule    • Viral upper respiratory tract infection    • Vitamin D deficiency         Past Surgical History:   Procedure Laterality Date   • BREAST BIOPSY      BX BREAST PERCUT W/O IMAGE 10525 (1)   x2   • CERVIX SURGERY  05/15/1973     Conization biopsy of cervix. Cervical cautery.   • COLONOSCOPY  06/20/2016    Diverticulosis in the sigmoid colon and in the descending colon.External and internal hemorrhoids.No specimens collected.   • DILATATION AND CURETTAGE  10/10/1990    Fractional   • ENDOSCOPY W/ PEG TUBE PLACEMENT  06/20/2012    Esophagitis seen. Biopsy taken. Gastritis in stomach. Biopsy taken. Hiatus hernia in stomach. Normal duodenum. Biopsy taken.   • EXCISION LESION  05/18/1967    Excision of inclusion cyst, vagina.   • INJECTION OF MEDICATION  05/04/2016    Kenalog (4)      • PAP SMEAR  07/22/2010    Mild inflammation present. Negative   • TONSILLECTOMY  01/15/1964    Recurring acute tonsillitis.                             PT Assessment/Plan       02/21/18 1432       PT Assessment    Assessment Comments Good tolerance with ther ex.  Report no c/o increase pain.  -CP     PT Plan    PT Frequency 1x/week  -CP     Predicted Duration of Therapy Intervention (days/wks) 3 to 4 weeks  -CP     PT Plan Comments Cont with POC.  -CP       User Key  (r) = Recorded By, (t) = Taken By, (c) = Cosigned By    Initials Name Provider Type    CP Christy Ramos PTA Physical Therapy Assistant                Modalities       02/21/18 1300          Ice    Ice Applied Yes  -CP      Location left shoulder  -CP      Rx Minutes --   20 minutes with IFC  -CP      Ice S/P Rx Yes  -CP      ELECTRICAL STIMULATION    Attended/Unattended Unattended  -CP      Stimulation Type IFC  -CP      Location/Electrode Placement/Other left shoulder   -CP      Rx Minutes 20 mins  -CP        User Key  (r) = Recorded By, (t) = Taken By, (c) = Cosigned By    Initials Name Provider Type    ALLEN Ramos PTA Physical Therapy Assistant                Exercises       02/21/18 1300          Subjective Comments    Subjective Comments Patient states that she is getting better all the time.  -CP      Subjective Pain    Able to rate subjective pain? yes  -CP      Pre-Treatment  Pain Level 2  -CP      Post-Treatment Pain Level 0  -CP      Subjective Pain Comment pain maybe because of rain??  -CP      Exercise 1    Exercise Name 1 Pro II UE  -CP      Time (Minutes) 1 10  -CP      Additional Comments Level 1  -CP      Exercise 2    Exercise Name 2 pulleys fl and ab  -CP      Reps 2 20  -CP      Exercise 3    Exercise Name 3 scap row mid on CC  -CP      Sets 3 2  -CP      Reps 3 10  -CP      Time (Minutes) 3 4 plates  -CP      Exercise 4    Exercise Name 4 standing fl to 90  -CP      Sets 4 2  -CP      Reps 4 10  -CP      Time (Minutes) 4 1 lb  -CP      Exercise 5    Exercise Name 5 Standing scap to 90  -CP      Sets 5 2  -CP      Reps 5 10  -CP      Exercise 6    Exercise Name 6 Standing bilateral shoulder ext with tband  -CP      Sets 6 2  -CP      Reps 6 10  -CP      Time (Minutes) 6 red  -CP      Exercise 7    Exercise Name 7 supine horz AD/AB with cane  -CP      Sets 7 2  -CP      Reps 7 10  -CP      Exercise 8    Exercise Name 8 doorway stretch  -CP      Sets 8 3  -CP      Time (Seconds) 8 30  -CP      Exercise 9    Exercise Name 9 Reverse corner push up  -CP      Sets 9 2  -CP      Reps 9 10  -CP        User Key  (r) = Recorded By, (t) = Taken By, (c) = Cosigned By    Initials Name Provider Type    CP Christy Ramos PTA Physical Therapy Assistant                               PT OP Goals       02/21/18 1400       PT Short Term Goals    STG Date to Achieve 03/07/18  -CP     STG 1 Independent in HEP   -CP     STG 1 Progress Ongoing  -CP     STG 2 AROM shoulder flex 120 degrees or better   -CP     STG 2 Progress Progressing  -CP     STG 3 AROM shoulder abduction 120 degrees or better   -CP     STG 3 Progress Progressing  -CP     STG 4 ER AROM with arm at side 45 degrees or better   -CP     STG 4 Progress Progressing  -CP     STG 5 Shoulder MMT grossly 4+/5 or better   -CP     STG 5 Progress Progressing  -CP     Time Calculation    PT Goal Re-Cert Due Date 03/07/18  -CP       User Key   (r) = Recorded By, (t) = Taken By, (c) = Cosigned By    Initials Name Provider Type    CP Christy Ramos PTA Physical Therapy Assistant          Therapy Education  Given: HEP  Program: Reinforced  How Provided: Verbal  Provided to: Patient  Level of Understanding: Verbalized              Time Calculation:   Start Time: 1345  Stop Time: 1445  Time Calculation (min): 60 min  Total Timed Code Minutes- PT: 40 minute(s)    Therapy Charges for Today     Code Description Service Date Service Provider Modifiers Qty    34738934393 HC PT THER PROC EA 15 MIN 2/21/2018 Christy Ramos PTA GP 3    89218259885 HC PT ELECTRICAL STIM UNATTENDED 2/21/2018 Christy Ramos PTA  1    00659490940 HC PT THER SUPP EA 15 MIN 2/21/2018 Christy Ramos PTA GP 1                    Christy Ramos PTA  2/21/2018

## 2018-02-28 ENCOUNTER — HOSPITAL ENCOUNTER (OUTPATIENT)
Dept: PHYSICAL THERAPY | Facility: HOSPITAL | Age: 73
Setting detail: THERAPIES SERIES
Discharge: HOME | End: 2018-02-28
Attending: ORTHOPAEDIC SURGERY

## 2018-02-28 DIAGNOSIS — M25.512 LEFT SHOULDER PAIN, UNSPECIFIED CHRONICITY: Primary | ICD-10-CM

## 2018-02-28 DIAGNOSIS — M75.42 SHOULDER IMPINGEMENT SYNDROME, LEFT: ICD-10-CM

## 2018-02-28 DIAGNOSIS — M75.102 ROTATOR CUFF SYNDROME OF LEFT SHOULDER: ICD-10-CM

## 2018-02-28 PROCEDURE — 97110 THERAPEUTIC EXERCISES: CPT

## 2018-02-28 PROCEDURE — G0283 ELEC STIM OTHER THAN WOUND: HCPCS

## 2018-03-02 RX ORDER — ESOMEPRAZOLE MAGNESIUM 40 MG/1
40 CAPSULE, DELAYED RELEASE ORAL
Qty: 90 CAPSULE | Refills: 3 | Status: SHIPPED | OUTPATIENT
Start: 2018-03-02 | End: 2018-05-07 | Stop reason: CLARIF

## 2018-03-07 ENCOUNTER — HOSPITAL ENCOUNTER (OUTPATIENT)
Dept: PHYSICAL THERAPY | Facility: HOSPITAL | Age: 73
Setting detail: THERAPIES SERIES
Discharge: HOME | End: 2018-03-07
Attending: ORTHOPAEDIC SURGERY

## 2018-03-07 DIAGNOSIS — M75.102 ROTATOR CUFF SYNDROME OF LEFT SHOULDER: ICD-10-CM

## 2018-03-07 DIAGNOSIS — M75.42 SHOULDER IMPINGEMENT SYNDROME, LEFT: ICD-10-CM

## 2018-03-07 DIAGNOSIS — M25.512 LEFT SHOULDER PAIN, UNSPECIFIED CHRONICITY: Primary | ICD-10-CM

## 2018-03-07 PROCEDURE — G8984 CARRY CURRENT STATUS: HCPCS | Performed by: PHYSICAL THERAPIST

## 2018-03-07 PROCEDURE — 97110 THERAPEUTIC EXERCISES: CPT | Performed by: PHYSICAL THERAPIST

## 2018-03-07 PROCEDURE — G8985 CARRY GOAL STATUS: HCPCS | Performed by: PHYSICAL THERAPIST

## 2018-03-07 NOTE — THERAPY PROGRESS REPORT/RE-CERT
Outpatient Physical Therapy Ortho Progress Note  [facility]     Patient Name: Akiko Blackwell  : 1945  MRN: [id number]  Today's Date: 3/7/2018      Visit Date: 2018  Attendance:   Subjective % Improvement: 80%  Recert Date: 3/28/2018-Patient on hold  MD appointment: 3/13/18 at 1320    Therapy Diagnosis: Left shoulder pain    Patient Active Problem List   Diagnosis   • Hypothyroidism due to Hashimoto's thyroiditis   • Vitamin D deficiency   • B12 deficiency   • Thyroid nodule   • Osteopenia   • FH: ovarian cancer in first degree relative   • Esophagitis   • Atrophic vaginitis   • Allergic rhinitis   • Diverticular disease of colon   • Primary osteoarthritis of both knees   • Family history of malignant neoplasm of gastrointestinal tract        Past Medical History:   Diagnosis Date   • Acute bronchitis, unspecified    • Acute laryngitis    • Allergic rhinitis    • Atrophic vaginitis    • Breast cyst    • Diverticular disease of colon    • Encounter for gynecological examination (general) (routine) without abnormal findings    • Encounter for screening for malignant neoplasm of colon    • Encounter for screening for osteoporosis    • Esophagitis    • Family history of malignant neoplasm of gastrointestinal tract    • GERD (gastroesophageal reflux disease)    • History of bone density study 2016    DXA BONE DENSITY AXIAL [facility] (2)    • History of screening mammography 2016   • Osteopenia    • Pain in right knee    • Subclinical hypothyroidism    • Thyroid nodule    • Viral upper respiratory tract infection    • Vitamin D deficiency         Past Surgical History:   Procedure Laterality Date   • BREAST BIOPSY      BX BREAST PERCUT W/O IMAGE  (1)   x2   • CERVIX SURGERY  05/15/1973    Conization biopsy of cervix. Cervical cautery.   • COLONOSCOPY  2016    Diverticulosis in the sigmoid colon and in the descending colon.External and internal hemorrhoids.No  "specimens collected.   • DILATATION AND CURETTAGE  10/10/1990    Fractional   • ENDOSCOPY W/ PEG TUBE PLACEMENT  06/20/2012    Esophagitis seen. Biopsy taken. Gastritis in stomach. Biopsy taken. Hiatus hernia in stomach. Normal duodenum. Biopsy taken.   • EXCISION LESION  05/18/1967    Excision of inclusion cyst, vagina.   • INJECTION OF MEDICATION  05/04/2016    Kenalog (4)      • PAP SMEAR  07/22/2010    Mild inflammation present. Negative   • TONSILLECTOMY  01/15/1964    Recurring acute tonsillitis.       Visit Dx:     ICD-10-CM ICD-9-CM   1. Left shoulder pain, unspecified chronicity M25.512 719.41   2. Rotator cuff syndrome of left shoulder M75.102 726.10   3. Shoulder impingement syndrome, left M75.42 726.2                 PT Ortho       03/07/18 1100    Precautions and Contraindications    Precautions/Limitations no known precautions/limitations  -BB    Contraindications MRI shows spurs/arthritis  -BB    Subjective Pain    Able to rate subjective pain? yes  -BB    Pre-Treatment Pain Level 3   \"not much\"  -BB    Posture/Observations    Posture/Observations Comments No acute distress no antalgics. Forward head and rounded shoulders.   -BB    Quarter Clearing    Quarter Clearing Upper Quarter Clearing   In tact  -BB    Special Tests/Palpation    Special Tests/Palpation Shoulder   No significant tenderness noted today   -BB    Shoulder Girdle Palpation    Shoulder Girdle Palpation? Yes  -BB    Shoulder Impingement/Rotator Cuff Special Tests    Empty Can Test (RC Lesion) Negative  -BB    Drop Arm Test (Full Thickness RC Lesion) Negative  -BB    Hornblower Test (Teres Minor Lesion) Negative  -BB    ROM (Range of Motion)    General ROM Detail AAROM flexion: 130, abduction AAROM: 135 no pain with AAROM. AROM flexion: 132, abduction: 140 ER with arm at side: 45 degrees.  -BB    MMT (Manual Muscle Testing)    General MMT Assessment Detail Shoulder flex/abduction grossly 4+/5, shoulder IR/ER grossly 4+/5 no significant " pain noted will all   -BB      User Key  (r) = Recorded By, (t) = Taken By, (c) = Cosigned By    Initials Name Provider Type    BETTY Olson PT Physical Therapist                      Therapy Education  Education Details: HEP reviewed, fitness HEP   Given: HEP  Program: Reinforced  How Provided: Verbal  Provided to: Patient  Level of Understanding: Verbalized, Demonstrated           PT OP Goals       03/07/18 1100       PT Short Term Goals    STG Date to Achieve --  -BB     STG 1 Independent in HEP   -BB     STG 1 Progress Met  -BB     STG 2 AROM shoulder flex 120 degrees or better   -BB     STG 2 Progress Met  -BB     STG 3 AROM shoulder abduction 120 degrees or better   -BB     STG 3 Progress Met  -BB     STG 4 ER AROM with arm at side 45 degrees or better   -BB     STG 4 Progress Met  -BB     STG 5 Shoulder MMT grossly 4+/5 or better   -BB     STG 5 Progress Met  -BB     Time Calculation    PT Goal Re-Cert Due Date 03/28/18  -BB       User Key  (r) = Recorded By, (t) = Taken By, (c) = Cosigned By    Initials Name Provider Type    BETTY Olson, PT Physical Therapist                PT Assessment/Plan       03/07/18 1100       PT Assessment    Functional Limitations Limitation in home management;Performance in self-care ADL  -BB     Impairments Pain;Muscle strength;Range of motion  -BB     Assessment Comments Patient has met all goals. Patient continues to lack full strength and AROM of shoulder but is fully functional at this time. Patient is independent with HEP and is ready to be on own. Patient is very apprehensive of being DC so patient is on hold at this time to see how progresses on own and is to return to PT only if significantly declines. Patient given 1 month fitness memebership to continue to progress strength as well. MD progress note faxed to MD office.   -BB     Rehab Potential Good  -BB     Patient/caregiver participated in establishment of treatment plan and goals Yes  -BB     Patient would  "benefit from skilled therapy intervention --   Patient on hold to try HEP on own at this time.   -BB     PT Plan    Predicted Duration of Therapy Intervention (days/wks) On hold with possible DC   -BB     PT Plan Comments Continue POC if needed but on hold for now with likely DC  -BB       User Key  (r) = Recorded By, (t) = Taken By, (c) = Cosigned By    Initials Name Provider Type    BETTY Olson PT Physical Therapist                  Exercises       03/07/18 1100          Subjective Pain    Able to rate subjective pain? yes  -BB      Pre-Treatment Pain Level 3   \"not much\"  -BB      Exercise 1    Exercise Name 1 pro ll UE strength  -BB      Additional Comments level 2  -BB      Exercise 2    Exercise Name 2 Pulleys flex/abd   -BB      Sets 2 1  -BB      Reps 2 20  -BB      Exercise 3    Exercise Name 3 Recheck   -BB      Additional Comments MMT/ROM/HEP review   -BB      Exercise 4    Exercise Name 4 Cybex row   -BB      Sets 4 1  -BB      Reps 4 10  -BB      Additional Comments 30#  -BB      Exercise 5    Exercise Name 5 cybex incline pull  -BB      Sets 5 1  -BB      Reps 5 10  -BB      Additional Comments 15#  -BB      Exercise 6    Exercise Name 6 Education on how to self start pro 2   -BB        User Key  (r) = Recorded By, (t) = Taken By, (c) = Cosigned By    Initials Name Provider Type    BETTY Olson PT Physical Therapist                        Outcome Measure Options: Quick DASH  Quick DASH  Open a tight or new jar.: Mild Difficulty  Do heavy household chores (e.g., wash walls, wash floors): Moderate Difficulty  Carry a shopping bag or briefcase: Mild Difficulty  Wash your back: Moderate Difficulty  Use a knife to cut food: No Difficulty  During the past week, to what extent has your arm, shoulder, or hand problem interfered with your normal social activites with family, friends, neighbors or groups?: Slightly  During the past week, were you limited in your work or other regular daily activities " as a result of your arm, shoulder or hand problem?: Slightly Limited  Arm, Shoulder, or hand pain: Moderate  Tingling (pins and needles) in your arm, shoulder, or hand: None  During the past week, how much difficulty have you had sleeping because of the pain in your arm, shoulder or hand?: Mild Difficulty  Number of Questions Answered: 10  Quick DASH Score: 27.5         Time Calculation:   Start Time: 1102  Stop Time: 1150  Time Calculation (min): 48 min     Therapy Charges for Today     Code Description Service Date Service Provider Modifiers Qty    32036813082 HC PT CARRY MOV HAND OBJ CURRENT 3/7/2018 Ivett Olson, PT GP, CJ 1    16016468541 HC PT CARRY MOV HAND OBJ PROJECTED 3/7/2018 Ivett Olson, PT GP, CJ 1    38455081400 HC PT THER PROC EA 15 MIN 3/7/2018 Ivett Olson, PT GP 2    [id number]  PT THER SUPP EA 15 MIN 3/7/2018 Ivett Olson, PT GP 1          PT G-Codes  Outcome Measure Options: Quick DASH  Score: 27.5%  Functional Limitation: Carrying, moving and handling objects  Carrying, Moving and Handling Objects Current Status (): At least 20 percent but less than 40 percent impaired, limited or restricted  Carrying, Moving and Handling Objects Goal Status (): At least 20 percent but less than 40 percent impaired, limited or restricted         Ivett Olson, PT  3/7/2018

## 2018-03-12 ENCOUNTER — APPOINTMENT (OUTPATIENT)
Dept: PHYSICAL THERAPY | Facility: HOSPITAL | Age: 73
End: 2018-03-12
Attending: ORTHOPAEDIC SURGERY

## 2018-03-13 ENCOUNTER — OFFICE VISIT (OUTPATIENT)
Dept: ORTHOPEDIC SURGERY | Facility: CLINIC | Age: 73
End: 2018-03-13

## 2018-03-13 VITALS — HEIGHT: 66 IN | BODY MASS INDEX: 27.32 KG/M2 | WEIGHT: 170 LBS

## 2018-03-13 DIAGNOSIS — M75.42 SHOULDER IMPINGEMENT SYNDROME, LEFT: ICD-10-CM

## 2018-03-13 DIAGNOSIS — M75.102 ROTATOR CUFF SYNDROME OF LEFT SHOULDER: ICD-10-CM

## 2018-03-13 DIAGNOSIS — M25.512 LEFT SHOULDER PAIN, UNSPECIFIED CHRONICITY: Primary | ICD-10-CM

## 2018-03-13 PROCEDURE — 99213 OFFICE O/P EST LOW 20 MIN: CPT | Performed by: ORTHOPAEDIC SURGERY

## 2018-03-13 PROCEDURE — G8420 CALC BMI NORM PARAMETERS: HCPCS | Performed by: ORTHOPAEDIC SURGERY

## 2018-03-13 NOTE — PROGRESS NOTES
Akiko Blackwell is a 72 y.o. female returns for     Chief Complaint   Patient presents with   • Left Shoulder - Follow-up       HISTORY OF PRESENT ILLNESS:  Patient has finished PT  Shoulder is better, but still does have pain when trying to reach up.  The injection she received in January seems to help some.  Her right shoulder seems to have some pain now 2.  Therapy has helped.  She is sleeping better.        CONCURRENT MEDICAL HISTORY:    Past Medical History:   Diagnosis Date   • Acute bronchitis, unspecified    • Acute laryngitis    • Allergic rhinitis    • Atrophic vaginitis    • Breast cyst    • Diverticular disease of colon    • Encounter for gynecological examination (general) (routine) without abnormal findings    • Encounter for screening for malignant neoplasm of colon    • Encounter for screening for osteoporosis    • Esophagitis    • Family history of malignant neoplasm of gastrointestinal tract    • GERD (gastroesophageal reflux disease)    • History of bone density study 04/29/2016    DXA BONE DENSITY AXIAL [address] [facility] (2)    • History of screening mammography 01/02/2016   • Osteopenia    • Pain in right knee    • Subclinical hypothyroidism    • Thyroid nodule    • Viral upper respiratory tract infection    • Vitamin D deficiency        No Known Allergies      Current Outpatient Prescriptions:   •  Calcium Carbonate-Vitamin D 600-200 MG-UNIT tablet, Take 1 tablet by mouth Daily., Disp: , Rfl:   •  esomeprazole (NEXIUM) 40 MG capsule, Take 1 capsule by mouth Every Morning Before Breakfast., Disp: 90 capsule, Rfl: 3  •  levothyroxine (SYNTHROID, LEVOTHROID) 88 MCG tablet, Take 1 tab daily Monday to Saturday and 1.5 tabs on Sunday, Disp: 96 tablet, Rfl: 3  •  vitamin D (ERGOCALCIFEROL) 19959 units capsule capsule, Take 1 capsule by mouth Every 14 (Fourteen) Days., Disp: 6 capsule, Rfl: 3    Past Surgical History:   Procedure Laterality Date   • BREAST BIOPSY      BX BREAST PERCUT W/O IMAGE  "83923 (1)   x2   • CERVIX SURGERY  05/15/1973    Conization biopsy of cervix. Cervical cautery.   • COLONOSCOPY  06/20/2016    Diverticulosis in the sigmoid colon and in the descending colon.External and internal hemorrhoids.No specimens collected.   • DILATATION AND CURETTAGE  10/10/1990    Fractional   • ENDOSCOPY W/ PEG TUBE PLACEMENT  06/20/2012    Esophagitis seen. Biopsy taken. Gastritis in stomach. Biopsy taken. Hiatus hernia in stomach. Normal duodenum. Biopsy taken.   • EXCISION LESION  05/18/1967    Excision of inclusion cyst, vagina.   • INJECTION OF MEDICATION  05/04/2016    Kenalog (4)      • PAP SMEAR  07/22/2010    Mild inflammation present. Negative   • TONSILLECTOMY  01/15/1964    Recurring acute tonsillitis.       ROS  No fevers or chills.  No chest pain or shortness of air.  No GI or  disturbances.    PHYSICAL EXAMINATION:       Ht 167.6 cm (66\")   Wt 77.1 kg (170 lb)   LMP  (LMP Unknown)   BMI 27.44 kg/m²     Physical Exam   Constitutional: She is oriented to person, place, and time. She appears well-developed and well-nourished.   Neurological: She is alert and oriented to person, place, and time.   Psychiatric: She has a normal mood and affect. Her behavior is normal. Judgment and thought content normal.       GAIT:     [x]  Normal  []  Antalgic    Assistive device: [x]  None  []  Walker     []  Crutches  []  Cane     []  Wheelchair  []  Stretcher    Left Shoulder Exam     Tenderness   The patient is experiencing tenderness in the acromioclavicular joint.    Range of Motion   Active Abduction: 130   Forward Flexion: 110     Muscle Strength   Abduction: 4/5   Supraspinatus: 4/5     Tests   Hawkin's test: positive    Other   Erythema: absent  Sensation: normal  Pulse: present                 MRI left shoulder without contrast.     60: Left shoulder pain.     Prior exam: Left shoulder x-ray November 16, 2017.     TECHNIQUE: Multiplanar multisequence noncontrast images of the  left " shoulder.     There is acromioclavicular joint arthrosis. Capsule hypertrophy  and osteophytes arising from the distal clavicle causing mild  degree of underlying impingement.  Series 4 image six.      There is large amount of fluid in the subdeltoid and subacromial  bursa, bursitis.      Subtle partial-thickness bursal surface tear supraspinatous  tendon just beneath the acromioclavicular joint. No evidence of a  full-thickness tear. Normal subscapularis tendon. Normal glenoid  bernard. Normal biceps tendon.     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 Subtle partial-thickness bursal  surface tear supraspinatus tendon just beneath the  acromioclavicular joint. No evidence of a full-thickness rotator  cuff tear. Large amount of fluid in the subacromial and  subdeltoid bursa, bursitis.     MRI left shoulder is otherwise unremarkable.     Electronically signed by:  Brent Laird MD  12/11/2017 8:43 PM CST        ASSESSMENT:    Diagnoses and all orders for this visit:    Left shoulder pain, unspecified chronicity    Rotator cuff syndrome of left shoulder    Shoulder impingement syndrome, left          PLAN  Discussed the patient's BMI with her. BMI is within normal parameters. No follow-up required.    Continue slowly progress motion and activity as tolerated.  Continue with home exercise program for strengthening and conditioning.  He discussed the possibility of repeat injections and the possibility of surgical intervention if symptoms worsen or fail to progress.    Return if symptoms worsen or fail to improve, for recheck.    Good Beard MD

## 2018-04-11 DIAGNOSIS — M25.511 RIGHT SHOULDER PAIN, UNSPECIFIED CHRONICITY: Primary | ICD-10-CM

## 2018-04-12 ENCOUNTER — OFFICE VISIT (OUTPATIENT)
Dept: ORTHOPEDIC SURGERY | Facility: CLINIC | Age: 73
End: 2018-04-12

## 2018-04-12 VITALS — BODY MASS INDEX: 27.48 KG/M2 | HEIGHT: 66 IN | WEIGHT: 171 LBS

## 2018-04-12 DIAGNOSIS — M75.41 IMPINGEMENT SYNDROME OF SHOULDER, RIGHT: ICD-10-CM

## 2018-04-12 DIAGNOSIS — M25.511 ACUTE PAIN OF RIGHT SHOULDER: Primary | ICD-10-CM

## 2018-04-12 PROCEDURE — 20610 DRAIN/INJ JOINT/BURSA W/O US: CPT | Performed by: NURSE PRACTITIONER

## 2018-04-12 PROCEDURE — 99214 OFFICE O/P EST MOD 30 MIN: CPT | Performed by: NURSE PRACTITIONER

## 2018-04-12 RX ORDER — LIDOCAINE HYDROCHLORIDE 10 MG/ML
2 INJECTION, SOLUTION INFILTRATION; PERINEURAL
Status: COMPLETED | OUTPATIENT
Start: 2018-04-12 | End: 2018-04-12

## 2018-04-12 RX ORDER — TRIAMCINOLONE ACETONIDE 40 MG/ML
40 INJECTION, SUSPENSION INTRA-ARTICULAR; INTRAMUSCULAR
Status: COMPLETED | OUTPATIENT
Start: 2018-04-12 | End: 2018-04-12

## 2018-04-12 RX ADMIN — LIDOCAINE HYDROCHLORIDE 2 ML: 10 INJECTION, SOLUTION INFILTRATION; PERINEURAL at 10:00

## 2018-04-12 RX ADMIN — TRIAMCINOLONE ACETONIDE 40 MG: 40 INJECTION, SUSPENSION INTRA-ARTICULAR; INTRAMUSCULAR at 10:00
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
## 2018-04-19 ENCOUNTER — TELEPHONE (OUTPATIENT)
Dept: FAMILY MEDICINE CLINIC | Facility: CLINIC | Age: 73
End: 2018-04-19

## 2018-04-19 NOTE — TELEPHONE ENCOUNTER
Gwen KELLER pharmacist with [facility] called and said the patient's Nexium is not a covered formulary anymore and they were needing to get that changed to the generic Omeprazole or any other generic form Dr. Murray prefers.     Thank you.

## 2018-05-07 RX ORDER — PANTOPRAZOLE SODIUM 40 MG/1
40 TABLET, DELAYED RELEASE ORAL DAILY
Qty: 90 TABLET | Refills: 3 | Status: SHIPPED | OUTPATIENT
Start: 2018-05-07 | End: 2018-05-14 | Stop reason: CLARIF

## 2018-05-14 ENCOUNTER — APPOINTMENT (OUTPATIENT)
Dept: LAB | Facility: HOSPITAL | Age: 73
End: 2018-05-14

## 2018-05-14 LAB
25(OH)D3 SERPL-MCNC: 32.1 NG/ML (ref 30–100)
ALBUMIN SERPL-MCNC: 4.1 G/DL (ref 3.4–4.8)
ALBUMIN/GLOB SERPL: 1.4 G/DL (ref 1.1–1.8)
ALP SERPL-CCNC: 104 U/L (ref 38–126)
ALT SERPL W P-5'-P-CCNC: 30 U/L (ref 9–52)
ANION GAP SERPL CALCULATED.3IONS-SCNC: 10 MMOL/L (ref 5–15)
ARTICHOKE IGE QN: 86 MG/DL (ref 1–129)
AST SERPL-CCNC: 26 U/L (ref 14–36)
BASOPHILS # BLD AUTO: 0.03 10*3/MM3 (ref 0–0.2)
BASOPHILS NFR BLD AUTO: 0.5 % (ref 0–2)
BILIRUB SERPL-MCNC: 0.7 MG/DL (ref 0.2–1.3)
BUN BLD-MCNC: 15 MG/DL (ref 7–21)
BUN/CREAT SERPL: 20 (ref 7–25)
CALCIUM SPEC-SCNC: 9.7 MG/DL (ref 8.4–10.2)
CHLORIDE SERPL-SCNC: 103 MMOL/L (ref 95–110)
CHOLEST SERPL-MCNC: 187 MG/DL (ref 0–199)
CO2 SERPL-SCNC: 29 MMOL/L (ref 22–31)
CREAT BLD-MCNC: 0.75 MG/DL (ref 0.5–1)
DEPRECATED RDW RBC AUTO: 40 FL (ref 36.4–46.3)
EOSINOPHIL # BLD AUTO: 0.1 10*3/MM3 (ref 0–0.7)
EOSINOPHIL NFR BLD AUTO: 1.7 % (ref 0–7)
ERYTHROCYTE [DISTWIDTH] IN BLOOD BY AUTOMATED COUNT: 12.5 % (ref 11.5–14.5)
GFR SERPL CREATININE-BSD FRML MDRD: 76 ML/MIN/1.73 (ref 39–90)
GLOBULIN UR ELPH-MCNC: 3 GM/DL (ref 2.3–3.5)
GLUCOSE BLD-MCNC: 98 MG/DL (ref 60–100)
HCT VFR BLD AUTO: 42.4 % (ref 35–45)
HDLC SERPL-MCNC: 69 MG/DL (ref 60–200)
HGB BLD-MCNC: 15.1 G/DL (ref 12–15.5)
IMM GRANULOCYTES # BLD: 0.01 10*3/MM3 (ref 0–0.02)
IMM GRANULOCYTES NFR BLD: 0.2 % (ref 0–0.5)
LDLC/HDLC SERPL: 1.5 {RATIO} (ref 0–3.22)
LYMPHOCYTES # BLD AUTO: 1.56 10*3/MM3 (ref 0.6–4.2)
LYMPHOCYTES NFR BLD AUTO: 26.1 % (ref 10–50)
MAGNESIUM SERPL-MCNC: 2 MG/DL (ref 1.6–2.3)
MCH RBC QN AUTO: 31.1 PG (ref 26.5–34)
MCHC RBC AUTO-ENTMCNC: 35.6 G/DL (ref 31.4–36)
MCV RBC AUTO: 87.2 FL (ref 80–98)
MONOCYTES # BLD AUTO: 0.44 10*3/MM3 (ref 0–0.9)
MONOCYTES NFR BLD AUTO: 7.4 % (ref 0–12)
NEUTROPHILS # BLD AUTO: 3.84 10*3/MM3 (ref 2–8.6)
NEUTROPHILS NFR BLD AUTO: 64.1 % (ref 37–80)
PLATELET # BLD AUTO: 322 10*3/MM3 (ref 150–450)
PMV BLD AUTO: 9.7 FL (ref 8–12)
POTASSIUM BLD-SCNC: 3.9 MMOL/L (ref 3.5–5.1)
PROT SERPL-MCNC: 7.1 G/DL (ref 6.3–8.6)
RBC # BLD AUTO: 4.86 10*6/MM3 (ref 3.77–5.16)
SODIUM BLD-SCNC: 142 MMOL/L (ref 137–145)
TRIGL SERPL-MCNC: 73 MG/DL (ref 20–199)
TSH SERPL DL<=0.05 MIU/L-ACNC: 2.42 MIU/ML (ref 0.46–4.68)
VIT B12 BLD-MCNC: 310 PG/ML (ref 239–931)
WBC NRBC COR # BLD: 5.98 10*3/MM3 (ref 3.2–9.8)

## 2018-05-14 PROCEDURE — 82306 VITAMIN D 25 HYDROXY: CPT | Performed by: INTERNAL MEDICINE

## 2018-05-14 PROCEDURE — 82607 VITAMIN B-12: CPT | Performed by: INTERNAL MEDICINE

## 2018-05-14 PROCEDURE — 85025 COMPLETE CBC W/AUTO DIFF WBC: CPT | Performed by: INTERNAL MEDICINE

## 2018-05-14 PROCEDURE — 80061 LIPID PANEL: CPT | Performed by: INTERNAL MEDICINE

## 2018-05-14 PROCEDURE — 80053 COMPREHEN METABOLIC PANEL: CPT | Performed by: INTERNAL MEDICINE

## 2018-05-14 PROCEDURE — 84443 ASSAY THYROID STIM HORMONE: CPT | Performed by: INTERNAL MEDICINE

## 2018-05-14 PROCEDURE — 83735 ASSAY OF MAGNESIUM: CPT | Performed by: INTERNAL MEDICINE

## 2018-05-14 PROCEDURE — 36415 COLL VENOUS BLD VENIPUNCTURE: CPT | Performed by: INTERNAL MEDICINE

## 2018-05-14 RX ORDER — ESOMEPRAZOLE MAGNESIUM 40 MG/1
CAPSULE, DELAYED RELEASE ORAL
Qty: 90 CAPSULE | Refills: 3 | Status: SHIPPED | OUTPATIENT
Start: 2018-05-14 | End: 2018-10-03 | Stop reason: SDUPTHER

## 2018-05-21 ENCOUNTER — OFFICE VISIT (OUTPATIENT)
Dept: ENDOCRINOLOGY | Facility: CLINIC | Age: 73
End: 2018-05-21

## 2018-05-21 VITALS — HEART RATE: 92 BPM | WEIGHT: 170.5 LBS | HEIGHT: 66 IN | OXYGEN SATURATION: 97 % | BODY MASS INDEX: 27.4 KG/M2

## 2018-05-21 DIAGNOSIS — Z12.73 SCREENING FOR OVARIAN CANCER: ICD-10-CM

## 2018-05-21 DIAGNOSIS — E03.8 HYPOTHYROIDISM DUE TO HASHIMOTO'S THYROIDITIS: Primary | ICD-10-CM

## 2018-05-21 DIAGNOSIS — M85.80 OSTEOPENIA, UNSPECIFIED LOCATION: ICD-10-CM

## 2018-05-21 DIAGNOSIS — E53.8 B12 DEFICIENCY: ICD-10-CM

## 2018-05-21 DIAGNOSIS — M81.0 OSTEOPOROSIS, UNSPECIFIED OSTEOPOROSIS TYPE, UNSPECIFIED PATHOLOGICAL FRACTURE PRESENCE: ICD-10-CM

## 2018-05-21 DIAGNOSIS — E55.9 VITAMIN D DEFICIENCY: ICD-10-CM

## 2018-05-21 DIAGNOSIS — E06.3 HYPOTHYROIDISM DUE TO HASHIMOTO'S THYROIDITIS: Primary | ICD-10-CM

## 2018-05-21 PROCEDURE — 99214 OFFICE O/P EST MOD 30 MIN: CPT | Performed by: INTERNAL MEDICINE

## 2018-05-21 NOTE — PROGRESS NOTES
Akiko Blackwell is a 72 y.o. female who presents for  evaluation of   Chief Complaint   Patient presents with   • Hypothyroidism         Primary Care / Referring Provider  Barbie Murray MD    followup     reason - hypothyroidism    duration - March 2013    severity - mild to moderate    quality/timing  - progressive decline in function     aggravating factors - none    alleviating factors - levothyroxine    symptoms - hair falling and brittle nails - improved    --    thyroid nodule  personal interpretation , pseudonodule    Feb 2015 compared to Nov 2016   my interpretation - pseudonodules   radiologist interpreation - slight increase in size from 9 mm to 1 cm of largest nodule             ---    Ostepenia    DXA 2016     nl vit D on 600 +     Since last appt , had jaw fracture,       Past Medical History:   Diagnosis Date   • Acute bronchitis, unspecified    • Acute laryngitis    • Allergic rhinitis    • Atrophic vaginitis    • Breast cyst    • Diverticular disease of colon    • Encounter for gynecological examination (general) (routine) without abnormal findings    • Encounter for screening for malignant neoplasm of colon    • Encounter for screening for osteoporosis    • Esophagitis    • Family history of malignant neoplasm of gastrointestinal tract    • GERD (gastroesophageal reflux disease)    • History of bone density study 04/29/2016    DXA BONE DENSITY AXIAL [address] [facility] (2)    • History of screening mammography 01/02/2016   • Osteopenia    • Pain in right knee    • Subclinical hypothyroidism    • Thyroid nodule    • Viral upper respiratory tract infection    • Vitamin D deficiency      Family History   Problem Relation Age of Onset   • Ovarian cancer Mother         Onset age 70-74 years.   • Cancer Brother         Colorectal Cancer, onset age 70-74 years.   • Colon cancer Brother    • Diabetes Other      Social History   Substance Use Topics   • Smoking status: Former Smoker   • Smokeless  tobacco: Never Used   • Alcohol use No         Current Outpatient Prescriptions:   •  Calcium Carbonate-Vitamin D 600-200 MG-UNIT tablet, Take 1 tablet by mouth Daily., Disp: , Rfl:   •  esomeprazole (NEXIUM) 40 MG capsule, Brand name Nexium, one daily, Disp: 90 capsule, Rfl: 3  •  levothyroxine (SYNTHROID, LEVOTHROID) 88 MCG tablet, Take 1 tab daily Monday to Saturday and 1.5 tabs on Sunday, Disp: 96 tablet, Rfl: 3  •  vitamin D (ERGOCALCIFEROL) 93717 units capsule capsule, Take 1 capsule by mouth Every 14 (Fourteen) Days., Disp: 6 capsule, Rfl: 3    Review of Systems    Review of Systems   Constitutional: Negative for activity change, appetite change, chills, diaphoresis, fatigue, fever and unexpected weight change.   HENT: Negative for congestion, drooling, ear discharge, ear pain, facial swelling, mouth sores, nosebleeds, postnasal drip, sinus pressure, sneezing, sore throat, tinnitus, trouble swallowing and voice change.    Eyes: Negative.  Negative for photophobia, pain, discharge, redness and itching.   Respiratory: Negative.  Negative for apnea, cough, choking, chest tightness, shortness of breath, wheezing and stridor.    Cardiovascular: Negative.  Negative for chest pain, palpitations and leg swelling.   Gastrointestinal: Negative.  Negative for abdominal distention, abdominal pain, constipation, diarrhea, nausea and vomiting.   Endocrine: Negative.  Negative for cold intolerance, heat intolerance, polydipsia, polyphagia and polyuria.   Genitourinary: Negative for difficulty urinating, dysuria, flank pain and frequency.   Musculoskeletal: Negative for arthralgias, back pain, gait problem, joint swelling, myalgias, neck pain and neck stiffness.   Skin: Negative for color change, pallor, rash and wound.   Allergic/Immunologic: Negative for environmental allergies, food allergies and immunocompromised state.   Neurological: Negative for dizziness, tremors, seizures, syncope, facial asymmetry, speech  "difficulty, weakness, light-headedness, numbness and headaches.   Hematological: Negative for adenopathy. Does not bruise/bleed easily.   Psychiatric/Behavioral: Negative for agitation, behavioral problems, confusion, decreased concentration, dysphoric mood, hallucinations, self-injury, sleep disturbance and suicidal ideas. The patient is not nervous/anxious and is not hyperactive.         Objective:     Pulse 92   Ht 167.6 cm (66\")   Wt 77.3 kg (170 lb 8 oz)   LMP  (LMP Unknown)   SpO2 97%   BMI 27.52 kg/m²     Physical Exam   Constitutional: She is oriented to person, place, and time. She appears well-developed.   HENT:   Head: Normocephalic.   Right Ear: External ear normal.   Left Ear: External ear normal.   Nose: Nose normal.   Eyes: Conjunctivae and EOM are normal. No scleral icterus.   Neck: Normal range of motion. Neck supple. No tracheal deviation present. No thyromegaly present.   Cardiovascular: Normal rate, regular rhythm, normal heart sounds and intact distal pulses.  Exam reveals no gallop and no friction rub.    No murmur heard.  Pulmonary/Chest: Effort normal and breath sounds normal. No stridor. No respiratory distress. She has no wheezes. She has no rales. She exhibits no tenderness.   Abdominal: Soft. Bowel sounds are normal. She exhibits no distension and no mass. There is no tenderness. There is no rebound and no guarding.   Musculoskeletal: Normal range of motion. She exhibits no tenderness or deformity.   Lymphadenopathy:     She has no cervical adenopathy.   Neurological: She is alert and oriented to person, place, and time. She displays normal reflexes. She exhibits normal muscle tone. Coordination normal.   Skin: No rash noted. No erythema. No pallor.   Psychiatric: She has a normal mood and affect. Her behavior is normal. Judgment and thought content normal.       Lab Review            Assessment/Plan       ICD-10-CM ICD-9-CM   1. Hypothyroidism due to Hashimoto's thyroiditis E03.8 " 244.8    E06.3 245.2   2. Vitamin D deficiency E55.9 268.9   3. B12 deficiency E53.8 266.2   4. Osteopenia, unspecified location M85.80 733.90           Hypothyroidism    on Levothyroxine 88 mcgs , 6.5 tabs per week - one hour before supper or at bedtime    Please go back to daily - increase to 7.5    Also on nexium and ca + vit D - in a.m.     Lab Results   Component Value Date    TSH 2.420 05/14/2018    TSH 4.310 01/10/2018    TSH 5.630 (H) 05/08/2017               ---    vit D Def, Osteopenia    Lab Results   Component Value Date    ZKSO25UM 32.1 05/14/2018    BYYG42AN 34.1 01/10/2018    RDGT01GN 29.4 (L) 05/08/2017         DXA , April 2016    on 600 D + 400 Calcium    add extra 1000 u daily - stop     Add 50 th u every 2 weeks     ---    Pseudonodules last US from June 2015 compared to Nov 2016     Repeat and if stable that would be plast     --    on nexium  she rightufully requests for Mg assessment and nl     --    Sec polycythemia    reasses    If persistent do sleep study - resolved    Lab Results   Component Value Date    WBC 5.98 05/14/2018    HGB 15.1 05/14/2018    HCT 42.4 05/14/2018    MCV 87.2 05/14/2018     05/14/2018           I reviewed and summarized records from Barbie Murray MD from 2016 and I reviewed / ordered labs.     No orders of the defined types were placed in this encounter.        A copy of my note was sent to Barbie Murray MD    Please see my above opinion and suggestions.

## 2018-06-04 ENCOUNTER — TELEPHONE (OUTPATIENT)
Dept: FAMILY MEDICINE CLINIC | Facility: CLINIC | Age: 73
End: 2018-06-04

## 2018-06-04 ENCOUNTER — TELEPHONE (OUTPATIENT)
Dept: ENDOCRINOLOGY | Facility: CLINIC | Age: 73
End: 2018-06-04

## 2018-06-05 ENCOUNTER — OFFICE VISIT (OUTPATIENT)
Dept: FAMILY MEDICINE CLINIC | Facility: CLINIC | Age: 73
End: 2018-06-05

## 2018-06-05 VITALS
DIASTOLIC BLOOD PRESSURE: 72 MMHG | HEART RATE: 82 BPM | HEIGHT: 66 IN | WEIGHT: 168 LBS | BODY MASS INDEX: 27 KG/M2 | SYSTOLIC BLOOD PRESSURE: 130 MMHG | OXYGEN SATURATION: 98 %

## 2018-06-05 DIAGNOSIS — L73.9 INFLAMED HAIR FOLLICLE: Primary | ICD-10-CM

## 2018-06-05 PROCEDURE — 99213 OFFICE O/P EST LOW 20 MIN: CPT | Performed by: FAMILY MEDICINE

## 2018-06-05 NOTE — PROGRESS NOTES
I have reviewed the notes, assessments, and/or procedures performed. I concur with her/his documentation of Akiko Blackwell.     Richard Ponce, DO

## 2018-06-05 NOTE — PROGRESS NOTES
Subjective:     Akiko Blackwell is a 72 y.o. female who presents for evaluation of acute illness.    Chief Complaint   Patient presents with   • Rash     left hip       Patient was seen at urgent care on Saturday for red bump on left buttock and states she was given bactrim for skin infection which made her feel nauseated so she stopped taking it.     Patient reports that she notice a red area on left buttock while taking a shower about 1 week ago, she could tell it was raised.  Reports she had a red bump the size of a quarter with 2 smaller red spots next to it. It does not itch, is not painful, has no drainage and no change in size.  She does not recall having a bug bite in that area.          The following portions of the patient's history were reviewed and updated as appropriate: allergies, current medications, past family history, past medical history, past social history, past surgical history and problem list.    Past Medical History:   Diagnosis Date   • Acute bronchitis, unspecified    • Acute laryngitis    • Allergic rhinitis    • Atrophic vaginitis    • Breast cyst    • Diverticular disease of colon    • Encounter for gynecological examination (general) (routine) without abnormal findings    • Encounter for screening for malignant neoplasm of colon    • Encounter for screening for osteoporosis    • Esophagitis    • Family history of malignant neoplasm of gastrointestinal tract    • GERD (gastroesophageal reflux disease)    • History of bone density study 04/29/2016    DXA BONE DENSITY AXIAL [address] (2)    • History of screening mammography 01/02/2016   • Osteopenia    • Pain in right knee    • Subclinical hypothyroidism    • Thyroid nodule    • Viral upper respiratory tract infection    • Vitamin D deficiency        Past Surgical History:   Procedure Laterality Date   • BREAST BIOPSY      BX BREAST PERCUT W/O IMAGE 49151 (1)   x2   • CERVIX SURGERY  05/15/1973    Conization biopsy of cervix.  Cervical cautery.   • COLONOSCOPY  06/20/2016    Diverticulosis in the sigmoid colon and in the descending colon.External and internal hemorrhoids.No specimens collected.   • DILATATION AND CURETTAGE  10/10/1990    Fractional   • ENDOSCOPY W/ PEG TUBE PLACEMENT  06/20/2012    Esophagitis seen. Biopsy taken. Gastritis in stomach. Biopsy taken. Hiatus hernia in stomach. Normal duodenum. Biopsy taken.   • EXCISION LESION  05/18/1967    Excision of inclusion cyst, vagina.   • INJECTION OF MEDICATION  05/04/2016    Kenalog (4)      • PAP SMEAR  07/22/2010    Mild inflammation present. Negative   • TONSILLECTOMY  01/15/1964    Recurring acute tonsillitis.       Family History   Problem Relation Age of Onset   • Ovarian cancer Mother         Onset age 70-74 years.   • Cancer Brother         Colorectal Cancer, onset age 70-74 years.   • Colon cancer Brother    • Diabetes Other          Current Outpatient Prescriptions:   •  Calcium Carbonate-Vitamin D 600-200 MG-UNIT tablet, Take 1 tablet by mouth Daily., Disp: , Rfl:   •  esomeprazole (NEXIUM) 40 MG capsule, Brand name Nexium, one daily, Disp: 90 capsule, Rfl: 3  •  levothyroxine (SYNTHROID, LEVOTHROID) 88 MCG tablet, Take 1 tab daily Monday to Saturday and 1.5 tabs on Sunday, Disp: 96 tablet, Rfl: 3  •  vitamin D (ERGOCALCIFEROL) 55051 units capsule capsule, Take 1 capsule by mouth Every 14 (Fourteen) Days., Disp: 6 capsule, Rfl: 3  •  doxycycline (VIBRAMYCIN) 100 MG capsule, Take 1 capsule by mouth 2 (Two) Times a Day for 10 days., Disp: 20 capsule, Rfl: 0    No Known Allergies    Social History     Social History   • Marital status:      Spouse name: N/A   • Number of children: N/A   • Years of education: N/A     Occupational History   • Not on file.     Social History Main Topics   • Smoking status: Former Smoker   • Smokeless tobacco: Never Used   • Alcohol use No   • Drug use: Unknown   • Sexual activity: Not on file     Other Topics Concern   • Not on file  "    Social History Narrative   • No narrative on file       Review of Systems   Constitutional: Negative for activity change, appetite change, chills and fever.   HENT: Negative for congestion and rhinorrhea.    Eyes: Negative for photophobia and visual disturbance.   Respiratory: Negative for cough, shortness of breath and wheezing.    Cardiovascular: Negative for chest pain and palpitations.   Gastrointestinal: Negative for abdominal pain, constipation, diarrhea, nausea and vomiting.   Endocrine: Negative for polyphagia and polyuria.   Genitourinary: Negative for decreased urine volume and difficulty urinating.   Musculoskeletal: Negative for arthralgias and myalgias.   Skin: Positive for rash. Negative for color change.   Neurological: Negative for dizziness and weakness.   Psychiatric/Behavioral: Negative for behavioral problems and confusion.       Objective:     /72   Pulse 82   Ht 167.6 cm (66\")   Wt 76.2 kg (168 lb)   LMP  (LMP Unknown)   SpO2 98%   BMI 27.12 kg/m²     Physical Exam   Constitutional: She is oriented to person, place, and time. She appears well-developed and well-nourished.   HENT:   Head: Normocephalic and atraumatic.   Eyes: Conjunctivae and EOM are normal. Pupils are equal, round, and reactive to light.   Neck: Normal range of motion.   Cardiovascular: Normal rate, regular rhythm, normal heart sounds and intact distal pulses.    Pulmonary/Chest: Effort normal and breath sounds normal. No respiratory distress. She has no wheezes.   Abdominal: Soft. Bowel sounds are normal.   Musculoskeletal: Normal range of motion.   Neurological: She is alert and oriented to person, place, and time.   Skin: Skin is warm and dry. Capillary refill takes less than 2 seconds.        Vitals reviewed.      Assessment/Plan:     Akiko was seen today for rash.    Diagnoses and all orders for this visit:    Inflamed hair follicle - area does not appear to be infected or developing into abscess.  " Recommended warm compress and was with antibacterial soap.          Return for Next scheduled follow up.    Goals     • Weight (lb) < 66 kg (145 lb 8.1 oz)            Barriers to goals: None          Preventative:  Female Preventative: Colon cancer screening is up to date    Recommended: none     Patient is a non smoker.   does not drink  eat more fruits and vegetables, decrease soda or juice intake, increase water intake, increase physical activity, plan meals, eat breakfast and have 3 meals a day discussed non-weight bearing exercises for knee    Patient's Body mass index is 27.12 kg/m². BMI is within normal parameters. No follow-up required.    RISK SCORE: 3      Signature  Antonia Houston MD PGY3  [place] [address]  Office: [phone]    This document has been electronically signed by Antonia Houston MD on June 5, 2018 3:02 PM

## 2018-06-28 ENCOUNTER — DOCUMENTATION (OUTPATIENT)
Dept: PHYSICAL THERAPY | Facility: HOSPITAL | Age: 73
End: 2018-06-28

## 2018-08-29 ENCOUNTER — TELEPHONE (OUTPATIENT)
Dept: ORTHOPEDIC SURGERY | Facility: CLINIC | Age: 73
End: 2018-08-29

## 2018-08-29 NOTE — TELEPHONE ENCOUNTER
[employer] [employer] SENT OVER A LETTER NEEDING A STATEMENT, PATIENT IS CALLING TO SEE IF YOU ARE GOING TO COMPLETE THIS.  FUNMILAYO

## 2018-10-03 ENCOUNTER — TELEPHONE (OUTPATIENT)
Dept: FAMILY MEDICINE CLINIC | Facility: CLINIC | Age: 73
End: 2018-10-03

## 2018-10-03 RX ORDER — ESOMEPRAZOLE MAGNESIUM 40 MG/1
CAPSULE, DELAYED RELEASE ORAL
Qty: 90 CAPSULE | Refills: 3 | Status: SHIPPED | OUTPATIENT
Start: 2018-10-03 | End: 2019-07-03 | Stop reason: SDUPTHER

## 2018-10-03 NOTE — TELEPHONE ENCOUNTER
Pt came to desk to ask about refills.   She is wanting to get 3 month supply of brand name Nexium, stated that the generic does not work for her. And insurance covers brand name at a 3 month supply      Pt uses [facility]    Thanks

## 2018-10-31 ENCOUNTER — LAB (OUTPATIENT)
Dept: LAB | Facility: HOSPITAL | Age: 73
End: 2018-10-31

## 2018-10-31 DIAGNOSIS — Z12.73 SCREENING FOR OVARIAN CANCER: ICD-10-CM

## 2018-10-31 DIAGNOSIS — E06.3 HYPOTHYROIDISM DUE TO HASHIMOTO'S THYROIDITIS: ICD-10-CM

## 2018-10-31 DIAGNOSIS — E55.9 VITAMIN D DEFICIENCY: ICD-10-CM

## 2018-10-31 DIAGNOSIS — E03.8 HYPOTHYROIDISM DUE TO HASHIMOTO'S THYROIDITIS: ICD-10-CM

## 2018-10-31 DIAGNOSIS — E53.8 B12 DEFICIENCY: ICD-10-CM

## 2018-10-31 DIAGNOSIS — M85.80 OSTEOPENIA, UNSPECIFIED LOCATION: ICD-10-CM

## 2018-10-31 LAB
25(OH)D3 SERPL-MCNC: 33.5 NG/ML (ref 30–100)
ALBUMIN SERPL-MCNC: 4.3 G/DL (ref 3.4–4.8)
ALBUMIN/GLOB SERPL: 1.3 G/DL (ref 1.1–1.8)
ALP SERPL-CCNC: 111 U/L (ref 38–126)
ALT SERPL W P-5'-P-CCNC: 20 U/L (ref 9–52)
ANION GAP SERPL CALCULATED.3IONS-SCNC: 9 MMOL/L (ref 5–15)
ARTICHOKE IGE QN: 105 MG/DL (ref 1–129)
AST SERPL-CCNC: 33 U/L (ref 14–36)
BASOPHILS # BLD AUTO: 0.03 10*3/MM3 (ref 0–0.2)
BASOPHILS NFR BLD AUTO: 0.5 % (ref 0–2)
BILIRUB SERPL-MCNC: 0.8 MG/DL (ref 0.2–1.3)
BUN BLD-MCNC: 13 MG/DL (ref 7–21)
BUN/CREAT SERPL: 16.3 (ref 7–25)
CALCIUM SPEC-SCNC: 9.7 MG/DL (ref 8.4–10.2)
CHLORIDE SERPL-SCNC: 103 MMOL/L (ref 95–110)
CHOLEST SERPL-MCNC: 198 MG/DL (ref 0–199)
CO2 SERPL-SCNC: 27 MMOL/L (ref 22–31)
CREAT BLD-MCNC: 0.8 MG/DL (ref 0.5–1)
DEPRECATED RDW RBC AUTO: 39.9 FL (ref 36.4–46.3)
EOSINOPHIL # BLD AUTO: 0.05 10*3/MM3 (ref 0–0.7)
EOSINOPHIL NFR BLD AUTO: 0.8 % (ref 0–7)
ERYTHROCYTE [DISTWIDTH] IN BLOOD BY AUTOMATED COUNT: 12.5 % (ref 11.5–14.5)
GFR SERPL CREATININE-BSD FRML MDRD: 70 ML/MIN/1.73 (ref 39–90)
GLOBULIN UR ELPH-MCNC: 3.2 GM/DL (ref 2.3–3.5)
GLUCOSE BLD-MCNC: 101 MG/DL (ref 60–100)
HCT VFR BLD AUTO: 44.2 % (ref 35–45)
HDLC SERPL-MCNC: 72 MG/DL (ref 60–200)
HGB BLD-MCNC: 15.5 G/DL (ref 12–15.5)
IMM GRANULOCYTES # BLD: 0.01 10*3/MM3 (ref 0–0.02)
IMM GRANULOCYTES NFR BLD: 0.2 % (ref 0–0.5)
LDLC/HDLC SERPL: 1.48 {RATIO} (ref 0–3.22)
LYMPHOCYTES # BLD AUTO: 1.5 10*3/MM3 (ref 0.6–4.2)
LYMPHOCYTES NFR BLD AUTO: 23.8 % (ref 10–50)
MAGNESIUM SERPL-MCNC: 2.1 MG/DL (ref 1.6–2.3)
MCH RBC QN AUTO: 30.9 PG (ref 26.5–34)
MCHC RBC AUTO-ENTMCNC: 35.1 G/DL (ref 31.4–36)
MCV RBC AUTO: 88 FL (ref 80–98)
MONOCYTES # BLD AUTO: 0.43 10*3/MM3 (ref 0–0.9)
MONOCYTES NFR BLD AUTO: 6.8 % (ref 0–12)
NEUTROPHILS # BLD AUTO: 4.29 10*3/MM3 (ref 2–8.6)
NEUTROPHILS NFR BLD AUTO: 67.9 % (ref 37–80)
PLATELET # BLD AUTO: 311 10*3/MM3 (ref 150–450)
PMV BLD AUTO: 9.9 FL (ref 8–12)
POTASSIUM BLD-SCNC: 4 MMOL/L (ref 3.5–5.1)
PROT SERPL-MCNC: 7.5 G/DL (ref 6.3–8.6)
RBC # BLD AUTO: 5.02 10*6/MM3 (ref 3.77–5.16)
SODIUM BLD-SCNC: 139 MMOL/L (ref 137–145)
TRIGL SERPL-MCNC: 96 MG/DL (ref 20–199)
TSH SERPL DL<=0.05 MIU/L-ACNC: 1.8 MIU/ML (ref 0.46–4.68)
VIT B12 BLD-MCNC: 328 PG/ML (ref 239–931)
WBC NRBC COR # BLD: 6.31 10*3/MM3 (ref 3.2–9.8)

## 2018-10-31 PROCEDURE — 82306 VITAMIN D 25 HYDROXY: CPT

## 2018-10-31 PROCEDURE — 36415 COLL VENOUS BLD VENIPUNCTURE: CPT | Performed by: INTERNAL MEDICINE

## 2018-10-31 PROCEDURE — 85025 COMPLETE CBC W/AUTO DIFF WBC: CPT

## 2018-10-31 PROCEDURE — 80053 COMPREHEN METABOLIC PANEL: CPT

## 2018-10-31 PROCEDURE — 82607 VITAMIN B-12: CPT

## 2018-10-31 PROCEDURE — 86304 IMMUNOASSAY TUMOR CA 125: CPT | Performed by: INTERNAL MEDICINE

## 2018-10-31 PROCEDURE — 84443 ASSAY THYROID STIM HORMONE: CPT

## 2018-10-31 PROCEDURE — 83735 ASSAY OF MAGNESIUM: CPT

## 2018-10-31 PROCEDURE — 80061 LIPID PANEL: CPT

## 2018-11-01 LAB — CANCER AG125 SERPL-ACNC: 11.9 U/ML (ref 0–38.1)

## 2018-11-02 ENCOUNTER — OFFICE VISIT (OUTPATIENT)
Dept: ENDOCRINOLOGY | Facility: CLINIC | Age: 73
End: 2018-11-02

## 2018-11-02 VITALS
HEART RATE: 100 BPM | SYSTOLIC BLOOD PRESSURE: 132 MMHG | OXYGEN SATURATION: 98 % | BODY MASS INDEX: 27.55 KG/M2 | WEIGHT: 171.4 LBS | DIASTOLIC BLOOD PRESSURE: 76 MMHG | HEIGHT: 66 IN

## 2018-11-02 DIAGNOSIS — E55.9 VITAMIN D DEFICIENCY: ICD-10-CM

## 2018-11-02 DIAGNOSIS — E06.3 HYPOTHYROIDISM DUE TO HASHIMOTO'S THYROIDITIS: Primary | ICD-10-CM

## 2018-11-02 DIAGNOSIS — M85.80 OSTEOPENIA, UNSPECIFIED LOCATION: ICD-10-CM

## 2018-11-02 DIAGNOSIS — E03.8 HYPOTHYROIDISM DUE TO HASHIMOTO'S THYROIDITIS: Primary | ICD-10-CM

## 2018-11-02 DIAGNOSIS — E53.8 B12 DEFICIENCY: ICD-10-CM

## 2018-11-02 PROCEDURE — 99214 OFFICE O/P EST MOD 30 MIN: CPT | Performed by: INTERNAL MEDICINE

## 2018-11-02 RX ORDER — LEVOTHYROXINE SODIUM 88 UG/1
TABLET ORAL
Qty: 96 TABLET | Refills: 3 | Status: SHIPPED | OUTPATIENT
Start: 2018-11-02 | End: 2019-05-06 | Stop reason: SDUPTHER

## 2018-11-02 RX ORDER — ERGOCALCIFEROL 1.25 MG/1
50000 CAPSULE ORAL
Qty: 6 CAPSULE | Refills: 3 | Status: SHIPPED | OUTPATIENT
Start: 2018-11-02 | End: 2019-03-28 | Stop reason: SDUPTHER

## 2018-11-02 NOTE — PROGRESS NOTES
Akiko Blackwell is a 73 y.o. female who presents for  evaluation of   Chief Complaint   Patient presents with   • Hypothyroidism         Primary Care / Referring Provider  Barbie Murray MD    followup     reason - hypothyroidism    duration - March 2013    severity - mild to moderate    quality/timing  - progressive decline in function     aggravating factors - none    alleviating factors - levothyroxine    symptoms - hair falling and brittle nails - improved    --    thyroid nodule  personal interpretation , pseudonodule    Feb 2015 compared to Nov 2016   my interpretation - pseudonodules   radiologist interpreation - slight increase in size from 9 mm to 1 cm of largest nodule             ---    Ostepenia    DXA 2016     nl vit D on 600 +     Since last appt , had jaw fracture,       Past Medical History:   Diagnosis Date   • Acute bronchitis, unspecified    • Acute laryngitis    • Allergic rhinitis    • Atrophic vaginitis    • Breast cyst    • Diverticular disease of colon    • Encounter for gynecological examination (general) (routine) without abnormal findings    • Encounter for screening for malignant neoplasm of colon    • Encounter for screening for osteoporosis    • Esophagitis    • Family history of malignant neoplasm of gastrointestinal tract    • GERD (gastroesophageal reflux disease)    • History of bone density study 04/29/2016    DXA BONE DENSITY AXIAL [address] [facility] (2)    • History of screening mammography 01/02/2016   • Osteopenia    • Pain in right knee    • Subclinical hypothyroidism    • Thyroid nodule    • Viral upper respiratory tract infection    • Vitamin D deficiency      Family History   Problem Relation Age of Onset   • Ovarian cancer Mother         Onset age 70-74 years.   • Cancer Brother         Colorectal Cancer, onset age 70-74 years.   • Colon cancer Brother    • Diabetes Other      Social History   Substance Use Topics   • Smoking status: Former Smoker   •  Smokeless tobacco: Never Used   • Alcohol use No         Current Outpatient Prescriptions:   •  calcium carbonate (TUMS) 500 MG chewable tablet, Chew 1 tablet Daily., Disp: , Rfl:   •  esomeprazole (NEXIUM) 40 MG capsule, Brand name Nexium, one daily, Disp: 90 capsule, Rfl: 3  •  levothyroxine (SYNTHROID, LEVOTHROID) 88 MCG tablet, Take 1 tab daily Monday to Saturday and 1.5 tabs on Sunday, Disp: 96 tablet, Rfl: 3  •  vitamin D (ERGOCALCIFEROL) 23134 units capsule capsule, Take 1 capsule by mouth Every 14 (Fourteen) Days., Disp: 6 capsule, Rfl: 3    Review of Systems    Review of Systems   Constitutional: Negative for activity change, appetite change, chills, diaphoresis, fatigue, fever and unexpected weight change.   HENT: Negative for congestion, drooling, ear discharge, ear pain, facial swelling, mouth sores, nosebleeds, postnasal drip, sinus pressure, sneezing, sore throat, tinnitus, trouble swallowing and voice change.    Eyes: Negative.  Negative for photophobia, pain, discharge, redness and itching.   Respiratory: Negative.  Negative for apnea, cough, choking, chest tightness, shortness of breath, wheezing and stridor.    Cardiovascular: Negative.  Negative for chest pain, palpitations and leg swelling.   Gastrointestinal: Negative.  Negative for abdominal distention, abdominal pain, constipation, diarrhea, nausea and vomiting.   Endocrine: Negative.  Negative for cold intolerance, heat intolerance, polydipsia, polyphagia and polyuria.   Genitourinary: Negative for difficulty urinating, dysuria, flank pain and frequency.   Musculoskeletal: Negative for arthralgias, back pain, gait problem, joint swelling, myalgias, neck pain and neck stiffness.   Skin: Negative for color change, pallor, rash and wound.   Allergic/Immunologic: Negative for environmental allergies, food allergies and immunocompromised state.   Neurological: Negative for dizziness, tremors, seizures, syncope, facial asymmetry, speech difficulty,  "weakness, light-headedness, numbness and headaches.   Hematological: Negative for adenopathy. Does not bruise/bleed easily.   Psychiatric/Behavioral: Negative for agitation, behavioral problems, confusion, decreased concentration, dysphoric mood, hallucinations, self-injury, sleep disturbance and suicidal ideas. The patient is not nervous/anxious and is not hyperactive.         Objective:     /76   Pulse 100   Ht 167.6 cm (66\")   Wt 77.7 kg (171 lb 6.4 oz)   LMP  (LMP Unknown)   SpO2 98%   BMI 27.66 kg/m²     Physical Exam   Constitutional: She is oriented to person, place, and time. She appears well-developed.   HENT:   Head: Normocephalic.   Right Ear: External ear normal.   Left Ear: External ear normal.   Nose: Nose normal.   Eyes: Conjunctivae and EOM are normal. No scleral icterus.   Neck: Normal range of motion. Neck supple. No tracheal deviation present. No thyromegaly present.   Cardiovascular: Normal rate, regular rhythm, normal heart sounds and intact distal pulses.  Exam reveals no gallop and no friction rub.    No murmur heard.  Pulmonary/Chest: Effort normal and breath sounds normal. No stridor. No respiratory distress. She has no wheezes. She has no rales. She exhibits no tenderness.   Abdominal: Soft. Bowel sounds are normal. She exhibits no distension and no mass. There is no tenderness. There is no rebound and no guarding.   Musculoskeletal: Normal range of motion. She exhibits no tenderness or deformity.   Lymphadenopathy:     She has no cervical adenopathy.   Neurological: She is alert and oriented to person, place, and time. She displays normal reflexes. She exhibits normal muscle tone. Coordination normal.   Skin: No rash noted. No erythema. No pallor.   Psychiatric: She has a normal mood and affect. Her behavior is normal. Judgment and thought content normal.       Lab Review            Assessment/Plan       ICD-10-CM ICD-9-CM   1. Hypothyroidism due to Hashimoto's thyroiditis " E03.8 244.8    E06.3 245.2   2. Vitamin D deficiency E55.9 268.9   3. B12 deficiency E53.8 266.2   4. Osteopenia, unspecified location M85.80 733.90           Hypothyroidism    on Levothyroxine 88 mcgs , 6.5 tabs per week - one hour before supper or at bedtime    Please go back to daily - increase to 7.5    Also on nexium and ca + vit D - in a.m.     Lab Results   Component Value Date    TSH 1.800 10/31/2018    TSH 2.420 05/14/2018    TSH 4.310 01/10/2018               ---    vit D Def, Osteopenia    Lab Results   Component Value Date    TWEK53CH 33.5 10/31/2018    WCZL38IG 32.1 05/14/2018    WZEI44PP 34.1 01/10/2018         DXA , May 2018  on 600 D + 400 Calcium    add extra 1000 u daily - stop     Add 50 th u every 2 weeks     ---    Pseudonodules last US from June 2015 compared to Nov 2016     Repeat and if stable that would be plast     --    on nexium  she rightufully requests for Mg assessment and nl     --    Sec polycythemia    reasses    If persistent do sleep study - resolved    Lab Results   Component Value Date    WBC 6.31 10/31/2018    HGB 15.5 10/31/2018    HCT 44.2 10/31/2018    MCV 88.0 10/31/2018     10/31/2018           I reviewed and summarized records from Barbie Murray MD from 2016 and I reviewed / ordered labs.     No orders of the defined types were placed in this encounter.        A copy of my note was sent to Barbie Murray MD    Please see my above opinion and suggestions.

## 2018-12-03 ENCOUNTER — OFFICE VISIT (OUTPATIENT)
Dept: FAMILY MEDICINE CLINIC | Facility: CLINIC | Age: 73
End: 2018-12-03

## 2018-12-03 VITALS
HEIGHT: 66 IN | WEIGHT: 168 LBS | DIASTOLIC BLOOD PRESSURE: 70 MMHG | SYSTOLIC BLOOD PRESSURE: 120 MMHG | HEART RATE: 86 BPM | OXYGEN SATURATION: 98 % | BODY MASS INDEX: 27 KG/M2

## 2018-12-03 DIAGNOSIS — Z12.31 ENCOUNTER FOR SCREENING MAMMOGRAM FOR BREAST CANCER: ICD-10-CM

## 2018-12-03 DIAGNOSIS — E66.3 OVERWEIGHT (BMI 25.0-29.9): ICD-10-CM

## 2018-12-03 DIAGNOSIS — L65.9 HAIR LOSS: ICD-10-CM

## 2018-12-03 DIAGNOSIS — K21.00 GASTROESOPHAGEAL REFLUX DISEASE WITH ESOPHAGITIS: Primary | ICD-10-CM

## 2018-12-03 PROCEDURE — 99214 OFFICE O/P EST MOD 30 MIN: CPT | Performed by: FAMILY MEDICINE

## 2018-12-03 NOTE — PROGRESS NOTES
Subjective:     Akiko Blackwell is a 73 y.o. female   GERD  Paitent complains of heartburn. This has been associated with midespigastric pain and nocturnal burning.  She denies choking on food, cough, hematemesis and hoarseness. Symptoms have been present for 4 years. She denies dysphagia. She has not lost weight. She denies melena, hematochezia, hematemesis, and coffee ground emesis. Medical therapy in the past has included H2 antagonists and proton pump inhibitors.  Has been on Nexium 40 mg daily for over 5 years.  Patient tried to wean off Nexium and onto pepcid but was not successful. She wishes to remain on generic nexium.    Patient complains of hair loss on the her occipital region. She has started using a different shampoo that has seemed to help.    Past Medical Hx:   Past Medical History:   Diagnosis Date   • Acute bronchitis, unspecified    • Acute laryngitis    • Allergic rhinitis    • Atrophic vaginitis    • Breast cyst    • Diverticular disease of colon    • Encounter for gynecological examination (general) (routine) without abnormal findings    • Encounter for screening for malignant neoplasm of colon    • Encounter for screening for osteoporosis    • Esophagitis    • Family history of malignant neoplasm of gastrointestinal tract    • GERD (gastroesophageal reflux disease)    • History of bone density study 04/29/2016    DXA BONE DENSITY AXIAL [address] [facility] (2)    • History of screening mammography 01/02/2016   • Osteopenia    • Pain in right knee    • Subclinical hypothyroidism    • Thyroid nodule    • Viral upper respiratory tract infection    • Vitamin D deficiency        Past Surgical Hx:  Past Surgical History:   Procedure Laterality Date   • BREAST BIOPSY      BX BREAST PERCUT W/O IMAGE 06434 (1)   x2   • CERVIX SURGERY  05/15/1973    Conization biopsy of cervix. Cervical cautery.   • COLONOSCOPY  06/20/2016    Diverticulosis in the sigmoid colon and in the descending colon.External  and internal hemorrhoids.No specimens collected.   • DILATATION AND CURETTAGE  10/10/1990    Fractional   • ENDOSCOPY W/ PEG TUBE PLACEMENT  06/20/2012    Esophagitis seen. Biopsy taken. Gastritis in stomach. Biopsy taken. Hiatus hernia in stomach. Normal duodenum. Biopsy taken.   • EXCISION LESION  05/18/1967    Excision of inclusion cyst, vagina.   • INJECTION OF MEDICATION  05/04/2016    Kenalog (4)      • PAP SMEAR  07/22/2010    Mild inflammation present. Negative   • TONSILLECTOMY  01/15/1964    Recurring acute tonsillitis.       Health Maintenance:  Health Maintenance   Topic Date Due   • HEPATITIS A VACCINE ADULT (1 of 2) 09/28/1962   • MEDICARE ANNUAL WELLNESS  12/03/2019 (Originally 3/16/2018)   • MAMMOGRAM  02/02/2019   • DXA SCAN  05/30/2020   • COLONOSCOPY  06/20/2026   • TDAP/TD VACCINES (2 - Td) 03/16/2027   • INFLUENZA VACCINE  Completed   • PNEUMOCOCCAL VACCINES (65+ LOW/MEDIUM RISK)  Completed   • ZOSTER VACCINE  Completed       Current Meds:    Current Outpatient Medications:   •  calcium carbonate (TUMS) 500 MG chewable tablet, Chew 2 tablets Daily., Disp: , Rfl:   •  esomeprazole (NEXIUM) 40 MG capsule, Brand name Nexium, one daily, Disp: 90 capsule, Rfl: 3  •  levothyroxine (SYNTHROID, LEVOTHROID) 88 MCG tablet, Take 1 tab daily Monday to Saturday and 1.5 tabs on Sunday, Disp: 96 tablet, Rfl: 3  •  vitamin D (ERGOCALCIFEROL) 39698 units capsule capsule, Take 1 capsule by mouth Every 14 (Fourteen) Days., Disp: 6 capsule, Rfl: 3    Allergies:  Patient has no known allergies.    Family Hx:  Family History   Problem Relation Age of Onset   • Ovarian cancer Mother         Onset age 70-74 years.   • Cancer Brother         Colorectal Cancer, onset age 70-74 years.   • Colon cancer Brother    • Diabetes Other         Social History:  Social History     Socioeconomic History   • Marital status:      Spouse name: Not on file   • Number of children: Not on file   • Years of education: Not on file  "  • Highest education level: Not on file   Social Needs   • Financial resource strain: Not on file   • Food insecurity - worry: Not on file   • Food insecurity - inability: Not on file   • Transportation needs - medical: Not on file   • Transportation needs - non-medical: Not on file   Occupational History   • Not on file   Tobacco Use   • Smoking status: Former Smoker   • Smokeless tobacco: Never Used   Substance and Sexual Activity   • Alcohol use: No   • Drug use: Not on file   • Sexual activity: Not on file   Other Topics Concern   • Not on file   Social History Narrative   • Not on file       Review of Systems  Review of Systems   Constitutional: Negative for activity change, appetite change, fatigue and fever.   HENT: Negative for ear pain and sore throat.    Eyes: Negative for pain and visual disturbance.   Respiratory: Negative for cough and shortness of breath.    Cardiovascular: Negative for chest pain and palpitations.   Gastrointestinal: Negative for abdominal pain and nausea.   Endocrine: Negative for cold intolerance and heat intolerance.   Genitourinary: Negative for difficulty urinating and dysuria.   Musculoskeletal: Positive for arthralgias. Negative for gait problem.   Skin: Negative for color change and rash.   Neurological: Negative for dizziness, weakness and headaches.   Hematological: Negative for adenopathy. Does not bruise/bleed easily.   Psychiatric/Behavioral: Negative for agitation, confusion and sleep disturbance.       Objective:     /70 (BP Location: Right arm)   Pulse 86   Ht 167.6 cm (65.98\")   Wt 76.2 kg (168 lb)   LMP  (LMP Unknown)   SpO2 98%   BMI 27.13 kg/m²   Physical Exam   Constitutional: She is oriented to person, place, and time. She appears well-developed and well-nourished.   HENT:   Head: Normocephalic and atraumatic.   Right Ear: Hearing, tympanic membrane, external ear and ear canal normal.   Left Ear: Hearing, tympanic membrane, external ear and ear canal " normal.   Nose: Nose normal.   Mouth/Throat: Oropharynx is clear and moist.   Eyes: Conjunctivae and EOM are normal. Pupils are equal, round, and reactive to light.   Neck: Normal range of motion. Neck supple.   Cardiovascular: Normal rate, regular rhythm and normal heart sounds.   Pulmonary/Chest: Effort normal and breath sounds normal.   Abdominal: Soft. Bowel sounds are normal. She exhibits no distension. There is no tenderness.   Musculoskeletal: Normal range of motion.   Neurological: She is alert and oriented to person, place, and time.   Skin: Skin is warm and dry.   Hair thinning on posterior occipital area with new hair growth   Psychiatric: She has a normal mood and affect. Her speech is normal and behavior is normal. Cognition and memory are normal.             Assessment:       1. Gastroesophageal reflux disease with esophagitis    2. Encounter for screening mammogram for breast cancer    3. Hair loss    4. Overweight (BMI 25.0-29.9)         Plan:     1.  Rx changes: Continue current medications. Discussed Rogaine for hair loss.  Continue to use nioxin shampoo.   2.  Follow up: 1 year and as needed   3. Continue follow up with Endocrinology.  4. Mammogram ordered.      Goals        Weight    • Weight (lb) < 66 kg (145 lb 8.1 oz)      Barriers to goals: None            Preventative:  Female Preventative: Colon cancer screening is up to date  Recommended:Hep A   Patient is a non smoker.   does not drink  eat more fruits and vegetables, decrease soda or juice intake, increase water intake, increase physical activity, plan meals, eat breakfast and have 3 meals a day discussed non-weight bearing exercises for knee    RISK SCORE: 3       This document has been electronically signed by Barbie Murray MD on December 16, 2018 11:08 AM

## 2019-03-28 RX ORDER — ERGOCALCIFEROL 1.25 MG/1
CAPSULE ORAL
Qty: 6 CAPSULE | Refills: 2 | Status: SHIPPED | OUTPATIENT
Start: 2019-03-28 | End: 2019-05-06

## 2019-04-29 ENCOUNTER — APPOINTMENT (OUTPATIENT)
Dept: LAB | Facility: HOSPITAL | Age: 74
End: 2019-04-29

## 2019-04-29 DIAGNOSIS — E55.9 VITAMIN D DEFICIENCY: ICD-10-CM

## 2019-04-29 DIAGNOSIS — E06.3 HYPOTHYROIDISM DUE TO HASHIMOTO'S THYROIDITIS: Primary | ICD-10-CM

## 2019-04-29 DIAGNOSIS — M85.80 OSTEOPENIA, UNSPECIFIED LOCATION: ICD-10-CM

## 2019-04-29 DIAGNOSIS — E53.8 B12 DEFICIENCY: ICD-10-CM

## 2019-04-29 DIAGNOSIS — E03.8 HYPOTHYROIDISM DUE TO HASHIMOTO'S THYROIDITIS: Primary | ICD-10-CM

## 2019-04-29 LAB
25(OH)D3 SERPL-MCNC: 23.1 NG/ML (ref 30–100)
ALBUMIN SERPL-MCNC: 4.2 G/DL (ref 3.5–5.2)
ALBUMIN/GLOB SERPL: 1.6 G/DL
ALP SERPL-CCNC: 95 U/L (ref 39–117)
ALT SERPL W P-5'-P-CCNC: 17 U/L (ref 1–33)
ANION GAP SERPL CALCULATED.3IONS-SCNC: 11.5 MMOL/L
AST SERPL-CCNC: 18 U/L (ref 1–32)
BILIRUB SERPL-MCNC: 0.5 MG/DL (ref 0.2–1.2)
BUN BLD-MCNC: 13 MG/DL (ref 8–23)
BUN/CREAT SERPL: 15.5 (ref 7–25)
CALCIUM SPEC-SCNC: 10.1 MG/DL (ref 8.6–10.5)
CHLORIDE SERPL-SCNC: 105 MMOL/L (ref 98–107)
CO2 SERPL-SCNC: 26.5 MMOL/L (ref 22–29)
CREAT BLD-MCNC: 0.84 MG/DL (ref 0.57–1)
GFR SERPL CREATININE-BSD FRML MDRD: 66 ML/MIN/1.73
GLOBULIN UR ELPH-MCNC: 2.6 GM/DL
GLUCOSE BLD-MCNC: 95 MG/DL (ref 65–99)
POTASSIUM BLD-SCNC: 4.3 MMOL/L (ref 3.5–5.2)
PROT SERPL-MCNC: 6.8 G/DL (ref 6–8.5)
SODIUM BLD-SCNC: 143 MMOL/L (ref 136–145)
TSH SERPL DL<=0.05 MIU/L-ACNC: 1.32 MIU/ML (ref 0.27–4.2)

## 2019-04-29 PROCEDURE — 36415 COLL VENOUS BLD VENIPUNCTURE: CPT | Performed by: INTERNAL MEDICINE

## 2019-04-29 PROCEDURE — 82306 VITAMIN D 25 HYDROXY: CPT | Performed by: INTERNAL MEDICINE

## 2019-04-29 PROCEDURE — 80053 COMPREHEN METABOLIC PANEL: CPT | Performed by: INTERNAL MEDICINE

## 2019-04-29 PROCEDURE — 84443 ASSAY THYROID STIM HORMONE: CPT | Performed by: INTERNAL MEDICINE

## 2019-05-02 LAB
BASOPHILS # BLD AUTO: 0.03 10*3/MM3 (ref 0–0.2)
BASOPHILS NFR BLD AUTO: 0.4 % (ref 0–1.5)
DEPRECATED RDW RBC AUTO: 40.2 FL (ref 37–54)
EOSINOPHIL # BLD AUTO: 0.07 10*3/MM3 (ref 0–0.4)
EOSINOPHIL NFR BLD AUTO: 0.9 % (ref 0.3–6.2)
ERYTHROCYTE [DISTWIDTH] IN BLOOD BY AUTOMATED COUNT: 12.3 % (ref 12.3–15.4)
HCT VFR BLD AUTO: 44.9 % (ref 34–46.6)
HGB BLD-MCNC: 15.2 G/DL (ref 12–15.9)
IMM GRANULOCYTES # BLD AUTO: 0.02 10*3/MM3 (ref 0–0.05)
IMM GRANULOCYTES NFR BLD AUTO: 0.2 % (ref 0–0.5)
LYMPHOCYTES # BLD AUTO: 2.69 10*3/MM3 (ref 0.7–3.1)
LYMPHOCYTES NFR BLD AUTO: 33 % (ref 19.6–45.3)
MCH RBC QN AUTO: 30.4 PG (ref 26.6–33)
MCHC RBC AUTO-ENTMCNC: 33.9 G/DL (ref 31.5–35.7)
MCV RBC AUTO: 89.8 FL (ref 79–97)
MONOCYTES # BLD AUTO: 0.7 10*3/MM3 (ref 0.1–0.9)
MONOCYTES NFR BLD AUTO: 8.6 % (ref 5–12)
NEUTROPHILS # BLD AUTO: 4.64 10*3/MM3 (ref 1.7–7)
NEUTROPHILS NFR BLD AUTO: 56.9 % (ref 42.7–76)
NRBC BLD AUTO-RTO: 0.1 /100 WBC (ref 0–0.2)
PLATELET # BLD AUTO: 322 10*3/MM3 (ref 140–450)
PMV BLD AUTO: 10.5 FL (ref 6–12)
RBC # BLD AUTO: 5 10*6/MM3 (ref 3.77–5.28)
WBC NRBC COR # BLD: 8.15 10*3/MM3 (ref 3.4–10.8)

## 2019-05-02 PROCEDURE — 85025 COMPLETE CBC W/AUTO DIFF WBC: CPT | Performed by: INTERNAL MEDICINE

## 2019-05-02 PROCEDURE — 36415 COLL VENOUS BLD VENIPUNCTURE: CPT | Performed by: INTERNAL MEDICINE

## 2019-05-06 ENCOUNTER — OFFICE VISIT (OUTPATIENT)
Dept: ENDOCRINOLOGY | Facility: CLINIC | Age: 74
End: 2019-05-06

## 2019-05-06 VITALS
OXYGEN SATURATION: 99 % | BODY MASS INDEX: 27.92 KG/M2 | HEIGHT: 66 IN | WEIGHT: 173.7 LBS | HEART RATE: 84 BPM | DIASTOLIC BLOOD PRESSURE: 74 MMHG | SYSTOLIC BLOOD PRESSURE: 132 MMHG

## 2019-05-06 DIAGNOSIS — R97.8 OTHER ABNORMAL TUMOR MARKERS: ICD-10-CM

## 2019-05-06 DIAGNOSIS — E06.3 HYPOTHYROIDISM DUE TO HASHIMOTO'S THYROIDITIS: Primary | ICD-10-CM

## 2019-05-06 DIAGNOSIS — M81.0 OSTEOPOROSIS, UNSPECIFIED OSTEOPOROSIS TYPE, UNSPECIFIED PATHOLOGICAL FRACTURE PRESENCE: ICD-10-CM

## 2019-05-06 DIAGNOSIS — E03.8 HYPOTHYROIDISM DUE TO HASHIMOTO'S THYROIDITIS: Primary | ICD-10-CM

## 2019-05-06 DIAGNOSIS — M85.80 OSTEOPENIA, UNSPECIFIED LOCATION: ICD-10-CM

## 2019-05-06 DIAGNOSIS — E55.9 VITAMIN D DEFICIENCY: ICD-10-CM

## 2019-05-06 DIAGNOSIS — Z12.73 SCREENING FOR OVARIAN CANCER: ICD-10-CM

## 2019-05-06 DIAGNOSIS — E53.8 B12 DEFICIENCY: ICD-10-CM

## 2019-05-06 DIAGNOSIS — D75.1 SECONDARY POLYCYTHEMIA: ICD-10-CM

## 2019-05-06 DIAGNOSIS — E04.1 THYROID NODULE: ICD-10-CM

## 2019-05-06 PROCEDURE — 99214 OFFICE O/P EST MOD 30 MIN: CPT | Performed by: INTERNAL MEDICINE

## 2019-05-06 RX ORDER — LEVOTHYROXINE SODIUM 88 UG/1
TABLET ORAL
Qty: 96 TABLET | Refills: 3 | Status: SHIPPED | OUTPATIENT
Start: 2019-05-06 | End: 2019-11-06 | Stop reason: SDUPTHER

## 2019-05-06 RX ORDER — CHOLECALCIFEROL (VITAMIN D3) 1250 MCG
50000 CAPSULE ORAL
Qty: 12 CAPSULE | Refills: 11 | Status: SHIPPED | OUTPATIENT
Start: 2019-05-06 | End: 2019-11-06 | Stop reason: SDUPTHER

## 2019-05-06 NOTE — PROGRESS NOTES
Akiko Blackwell is a 73 y.o. female who presents for  evaluation of   Chief Complaint   Patient presents with   • Hypothyroidism         Primary Care / Referring Provider  Barbie Murray MD    followup     reason - hypothyroidism    duration - March 2013    severity - mild to moderate    quality/timing  - progressive decline in function     aggravating factors - none    alleviating factors - levothyroxine    symptoms - hair falling and brittle nails - improved    --    thyroid nodule  personal interpretation , pseudonodule    Feb 2015 compared to Nov 2016   my interpretation - pseudonodules   radiologist interpreation - slight increase in size from 9 mm to 1 cm of largest nodule             ---    Ostepenia    DXA 2016     nl vit D on 600 +     Since last appt , had jaw fracture,       Past Medical History:   Diagnosis Date   • Acute bronchitis, unspecified    • Acute laryngitis    • Allergic rhinitis    • Atrophic vaginitis    • Breast cyst    • Diverticular disease of colon    • Encounter for gynecological examination (general) (routine) without abnormal findings    • Encounter for screening for malignant neoplasm of colon    • Encounter for screening for osteoporosis    • Esophagitis    • Family history of malignant neoplasm of gastrointestinal tract    • GERD (gastroesophageal reflux disease)    • History of bone density study 04/29/2016    DXA BONE DENSITY AXIAL [facility] (2)    • History of screening mammography 01/02/2016   • Osteopenia    • Pain in right knee    • Subclinical hypothyroidism    • Thyroid nodule    • Viral upper respiratory tract infection    • Vitamin D deficiency      Family History   Problem Relation Age of Onset   • Ovarian cancer Mother         Onset age 70-74 years.   • Cancer Brother         Colorectal Cancer, onset age 70-74 years.   • Colon cancer Brother    • Diabetes Other      Social History     Tobacco Use   • Smoking status: Former Smoker   • Smokeless  tobacco: Never Used   Substance Use Topics   • Alcohol use: No   • Drug use: Not on file         Current Outpatient Medications:   •  calcium carbonate (TUMS) 500 MG chewable tablet, Chew 2 tablets Daily., Disp: , Rfl:   •  esomeprazole (NEXIUM) 40 MG capsule, Brand name Nexium, one daily, Disp: 90 capsule, Rfl: 3  •  levothyroxine (SYNTHROID, LEVOTHROID) 88 MCG tablet, Take 1 tab daily Monday to Saturday and 1.5 tabs on Sunday, Disp: 96 tablet, Rfl: 3  •  vitamin D (ERGOCALCIFEROL) 34516 units capsule capsule, TAKE 1 CAPSULE BY MOUTH EVERY 14 DAYS, Disp: 6 capsule, Rfl: 2    Review of Systems    Review of Systems   Constitutional: Negative for activity change, appetite change, chills, diaphoresis, fatigue, fever and unexpected weight change.   HENT: Negative for congestion, drooling, ear discharge, ear pain, facial swelling, mouth sores, nosebleeds, postnasal drip, sinus pressure, sneezing, sore throat, tinnitus, trouble swallowing and voice change.    Eyes: Negative.  Negative for photophobia, pain, discharge, redness and itching.   Respiratory: Negative.  Negative for apnea, cough, choking, chest tightness, shortness of breath, wheezing and stridor.    Cardiovascular: Negative.  Negative for chest pain, palpitations and leg swelling.   Gastrointestinal: Negative.  Negative for abdominal distention, abdominal pain, constipation, diarrhea, nausea and vomiting.   Endocrine: Negative.  Negative for cold intolerance, heat intolerance, polydipsia, polyphagia and polyuria.   Genitourinary: Negative for difficulty urinating, dysuria, flank pain and frequency.   Musculoskeletal: Negative for arthralgias, back pain, gait problem, joint swelling, myalgias, neck pain and neck stiffness.   Skin: Negative for color change, pallor, rash and wound.   Allergic/Immunologic: Negative for environmental allergies, food allergies and immunocompromised state.   Neurological: Negative for dizziness, tremors, seizures, syncope, facial  "asymmetry, speech difficulty, weakness, light-headedness, numbness and headaches.   Hematological: Negative for adenopathy. Does not bruise/bleed easily.   Psychiatric/Behavioral: Negative for agitation, behavioral problems, confusion, decreased concentration, dysphoric mood, hallucinations, self-injury, sleep disturbance and suicidal ideas. The patient is not nervous/anxious and is not hyperactive.         Objective:     /74   Pulse 84   Ht 167.6 cm (66\")   Wt 78.8 kg (173 lb 11.2 oz)   LMP  (LMP Unknown)   SpO2 99%   BMI 28.04 kg/m²     Physical Exam   Constitutional: She is oriented to person, place, and time. She appears well-developed.   HENT:   Head: Normocephalic.   Right Ear: External ear normal.   Left Ear: External ear normal.   Nose: Nose normal.   Eyes: Conjunctivae and EOM are normal. No scleral icterus.   Neck: Normal range of motion. Neck supple. No tracheal deviation present. No thyromegaly present.   Cardiovascular: Normal rate, regular rhythm, normal heart sounds and intact distal pulses. Exam reveals no gallop and no friction rub.   No murmur heard.  Pulmonary/Chest: Effort normal and breath sounds normal. No stridor. No respiratory distress. She has no wheezes. She has no rales. She exhibits no tenderness.   Abdominal: Soft. Bowel sounds are normal. She exhibits no distension and no mass. There is no tenderness. There is no rebound and no guarding.   Musculoskeletal: Normal range of motion. She exhibits no tenderness or deformity.   Lymphadenopathy:     She has no cervical adenopathy.   Neurological: She is alert and oriented to person, place, and time. She displays normal reflexes. She exhibits normal muscle tone. Coordination normal.   Skin: No rash noted. No erythema. No pallor.   Psychiatric: She has a normal mood and affect. Her behavior is normal. Judgment and thought content normal.       Lab Review            Assessment/Plan       ICD-10-CM ICD-9-CM   1. Hypothyroidism due to " Hashimoto's thyroiditis E03.8 244.8    E06.3 245.2   2. Vitamin D deficiency E55.9 268.9   3. B12 deficiency E53.8 266.2   4. Osteopenia, unspecified location M85.80 733.90   5. Osteoporosis, unspecified osteoporosis type, unspecified pathological fracture presence M81.0 733.00   6. Thyroid nodule E04.1 241.0   7. Secondary polycythemia D75.1 289.0           Hypothyroidism    on Levothyroxine 88 mcgs , 6.5 tabs per week - one hour before supper or at bedtime    Please go back to daily - increase to 7.5    Also on nexium and ca + vit D - in a.m.     Lab Results   Component Value Date    TSH 1.320 04/29/2019    TSH 1.800 10/31/2018    TSH 2.420 05/14/2018               ---    vit D Def, Osteopenia    Lab Results   Component Value Date    LTKN03QW 23.1 (L) 04/29/2019    CLEK66VE 33.5 10/31/2018    [id number] 32.1 05/14/2018         DXA , May 2018  on 600 D + 400 Calcium    add extra 1000 u daily - stop     Add 50 th u every 2 weeks - every week    ---    Pseudonodules last US from June 2015 compared to Nov 2016     Repeat and if stable that would be plast     --    on nexium  she rightufully requests for Mg assessment and nl     --    Sec polycythemia    reasses    If persistent do sleep study - resolved    Lab Results   Component Value Date    WBC 8.15 05/02/2019    HGB 15.2 05/02/2019    HCT 44.9 05/02/2019    MCV 89.8 05/02/2019     05/02/2019           I reviewed and summarized records from Barbie Murray MD from 2016 and I reviewed / ordered labs.     No orders of the defined types were placed in this encounter.        A copy of my note was sent to Barbie Murray MD    Please see my above opinion and suggestions.

## 2019-07-03 RX ORDER — ESOMEPRAZOLE MAGNESIUM 40 MG/1
CAPSULE, DELAYED RELEASE ORAL
Qty: 90 CAPSULE | Refills: 3 | Status: SHIPPED | OUTPATIENT
Start: 2019-07-03 | End: 2019-07-05 | Stop reason: SDUPTHER

## 2019-07-05 RX ORDER — ESOMEPRAZOLE MAGNESIUM 40 MG/1
CAPSULE, DELAYED RELEASE ORAL
Qty: 90 CAPSULE | Refills: 3 | Status: SHIPPED | OUTPATIENT
Start: 2019-07-05 | End: 2019-07-11 | Stop reason: SDUPTHER

## 2019-07-11 RX ORDER — ESOMEPRAZOLE MAGNESIUM 40 MG/1
CAPSULE, DELAYED RELEASE ORAL
Qty: 90 CAPSULE | Refills: 3 | Status: SHIPPED | OUTPATIENT
Start: 2019-07-11 | End: 2020-01-15

## 2019-10-31 ENCOUNTER — APPOINTMENT (OUTPATIENT)
Dept: LAB | Facility: HOSPITAL | Age: 74
End: 2019-10-31

## 2019-10-31 LAB
25(OH)D3 SERPL-MCNC: 62.7 NG/ML (ref 30–100)
ALBUMIN SERPL-MCNC: 4.3 G/DL (ref 3.5–5.2)
ALBUMIN/GLOB SERPL: 1.5 G/DL
ALP SERPL-CCNC: 99 U/L (ref 39–117)
ALT SERPL W P-5'-P-CCNC: 14 U/L (ref 1–33)
ANION GAP SERPL CALCULATED.3IONS-SCNC: 8.2 MMOL/L (ref 5–15)
AST SERPL-CCNC: 16 U/L (ref 1–32)
BASOPHILS # BLD AUTO: 0.02 10*3/MM3 (ref 0–0.2)
BASOPHILS NFR BLD AUTO: 0.4 % (ref 0–1.5)
BILIRUB SERPL-MCNC: 0.5 MG/DL (ref 0.2–1.2)
BUN BLD-MCNC: 11 MG/DL (ref 8–23)
BUN/CREAT SERPL: 12.9 (ref 7–25)
CALCIUM SPEC-SCNC: 9.6 MG/DL (ref 8.6–10.5)
CANCER AG125 SERPL QL: 9.9 U/ML (ref 0–38.1)
CHLORIDE SERPL-SCNC: 101 MMOL/L (ref 98–107)
CO2 SERPL-SCNC: 32.8 MMOL/L (ref 22–29)
CREAT BLD-MCNC: 0.85 MG/DL (ref 0.57–1)
DEPRECATED RDW RBC AUTO: 41.8 FL (ref 37–54)
EOSINOPHIL # BLD AUTO: 0.1 10*3/MM3 (ref 0–0.4)
EOSINOPHIL NFR BLD AUTO: 1.8 % (ref 0.3–6.2)
ERYTHROCYTE [DISTWIDTH] IN BLOOD BY AUTOMATED COUNT: 12.7 % (ref 12.3–15.4)
GFR SERPL CREATININE-BSD FRML MDRD: 65 ML/MIN/1.73
GLOBULIN UR ELPH-MCNC: 2.8 GM/DL
GLUCOSE BLD-MCNC: 97 MG/DL (ref 65–99)
HCT VFR BLD AUTO: 45.5 % (ref 34–46.6)
HGB BLD-MCNC: 15.4 G/DL (ref 12–15.9)
IMM GRANULOCYTES # BLD AUTO: 0.02 10*3/MM3 (ref 0–0.05)
IMM GRANULOCYTES NFR BLD AUTO: 0.4 % (ref 0–0.5)
LYMPHOCYTES # BLD AUTO: 1.67 10*3/MM3 (ref 0.7–3.1)
LYMPHOCYTES NFR BLD AUTO: 30 % (ref 19.6–45.3)
MAGNESIUM SERPL-MCNC: 2.2 MG/DL (ref 1.6–2.4)
MCH RBC QN AUTO: 30.1 PG (ref 26.6–33)
MCHC RBC AUTO-ENTMCNC: 33.8 G/DL (ref 31.5–35.7)
MCV RBC AUTO: 88.9 FL (ref 79–97)
MONOCYTES # BLD AUTO: 0.43 10*3/MM3 (ref 0.1–0.9)
MONOCYTES NFR BLD AUTO: 7.7 % (ref 5–12)
NEUTROPHILS # BLD AUTO: 3.32 10*3/MM3 (ref 1.7–7)
NEUTROPHILS NFR BLD AUTO: 59.7 % (ref 42.7–76)
NRBC BLD AUTO-RTO: 0 /100 WBC (ref 0–0.2)
PLATELET # BLD AUTO: 295 10*3/MM3 (ref 140–450)
PMV BLD AUTO: 10.5 FL (ref 6–12)
POTASSIUM BLD-SCNC: 4.4 MMOL/L (ref 3.5–5.2)
PROT SERPL-MCNC: 7.1 G/DL (ref 6–8.5)
RBC # BLD AUTO: 5.12 10*6/MM3 (ref 3.77–5.28)
SODIUM BLD-SCNC: 142 MMOL/L (ref 136–145)
TSH SERPL DL<=0.05 MIU/L-ACNC: 3.05 UIU/ML (ref 0.27–4.2)
VIT B12 BLD-MCNC: 337 PG/ML (ref 211–946)
WBC NRBC COR # BLD: 5.56 10*3/MM3 (ref 3.4–10.8)

## 2019-10-31 PROCEDURE — 82607 VITAMIN B-12: CPT | Performed by: INTERNAL MEDICINE

## 2019-10-31 PROCEDURE — 85025 COMPLETE CBC W/AUTO DIFF WBC: CPT | Performed by: INTERNAL MEDICINE

## 2019-10-31 PROCEDURE — 80053 COMPREHEN METABOLIC PANEL: CPT | Performed by: INTERNAL MEDICINE

## 2019-10-31 PROCEDURE — 36415 COLL VENOUS BLD VENIPUNCTURE: CPT | Performed by: INTERNAL MEDICINE

## 2019-10-31 PROCEDURE — 83735 ASSAY OF MAGNESIUM: CPT | Performed by: INTERNAL MEDICINE

## 2019-10-31 PROCEDURE — 86304 IMMUNOASSAY TUMOR CA 125: CPT | Performed by: INTERNAL MEDICINE

## 2019-10-31 PROCEDURE — 82306 VITAMIN D 25 HYDROXY: CPT | Performed by: INTERNAL MEDICINE

## 2019-10-31 PROCEDURE — 84443 ASSAY THYROID STIM HORMONE: CPT | Performed by: INTERNAL MEDICINE

## 2019-11-06 ENCOUNTER — OFFICE VISIT (OUTPATIENT)
Dept: ENDOCRINOLOGY | Facility: CLINIC | Age: 74
End: 2019-11-06

## 2019-11-06 VITALS
DIASTOLIC BLOOD PRESSURE: 72 MMHG | HEIGHT: 66 IN | OXYGEN SATURATION: 98 % | BODY MASS INDEX: 27.83 KG/M2 | SYSTOLIC BLOOD PRESSURE: 134 MMHG | WEIGHT: 173.2 LBS | HEART RATE: 83 BPM

## 2019-11-06 DIAGNOSIS — E55.9 VITAMIN D DEFICIENCY: ICD-10-CM

## 2019-11-06 DIAGNOSIS — E03.8 HYPOTHYROIDISM DUE TO HASHIMOTO'S THYROIDITIS: Primary | ICD-10-CM

## 2019-11-06 DIAGNOSIS — D75.1 SECONDARY POLYCYTHEMIA: ICD-10-CM

## 2019-11-06 DIAGNOSIS — E06.3 HYPOTHYROIDISM DUE TO HASHIMOTO'S THYROIDITIS: Primary | ICD-10-CM

## 2019-11-06 DIAGNOSIS — E53.8 B12 DEFICIENCY: ICD-10-CM

## 2019-11-06 PROCEDURE — 99214 OFFICE O/P EST MOD 30 MIN: CPT | Performed by: INTERNAL MEDICINE

## 2019-11-06 RX ORDER — LEVOTHYROXINE SODIUM 88 UG/1
TABLET ORAL
Qty: 96 TABLET | Refills: 3 | Status: SHIPPED | OUTPATIENT
Start: 2019-11-06 | End: 2020-07-28 | Stop reason: SDUPTHER

## 2019-11-06 RX ORDER — CHOLECALCIFEROL (VITAMIN D3) 1250 MCG
50000 CAPSULE ORAL
Qty: 12 CAPSULE | Refills: 11 | Status: SHIPPED | OUTPATIENT
Start: 2019-11-06 | End: 2020-04-29 | Stop reason: SDUPTHER

## 2019-11-06 NOTE — PROGRESS NOTES
Akiko Blackwell is a 74 y.o. female who presents for  evaluation of   Chief Complaint   Patient presents with   • Hypothyroidism         Primary Care / Referring Provider  Barbie Murray MD    followup     reason - hypothyroidism    duration - March 2013    alleviating factors - levothyroxine    symptoms - hair falling and brittle nails - improved    --    thyroid nodule  personal interpretation , pseudonodule    Feb 2015 compared to Nov 2016   my interpretation - pseudonodules   radiologist interpreation - slight increase in size from 9 mm to 1 cm of largest nodule             ---    Ostepenia    DXA 2016     nl vit D on 600 +     h o  jaw fracture,       Past Medical History:   Diagnosis Date   • Acute bronchitis, unspecified    • Acute laryngitis    • Allergic rhinitis    • Atrophic vaginitis    • Breast cyst    • Diverticular disease of colon    • Encounter for gynecological examination (general) (routine) without abnormal findings    • Encounter for screening for malignant neoplasm of colon    • Encounter for screening for osteoporosis    • Esophagitis    • Family history of malignant neoplasm of gastrointestinal tract    • GERD (gastroesophageal reflux disease)    • History of bone density study 04/29/2016    DXA BONE DENSITY AXIAL [address] [facility] (2)    • History of screening mammography 01/02/2016   • Osteopenia    • Pain in right knee    • Subclinical hypothyroidism    • Thyroid nodule    • Viral upper respiratory tract infection    • Vitamin D deficiency      Family History   Problem Relation Age of Onset   • Ovarian cancer Mother         Onset age 70-74 years.   • Cancer Brother         Colorectal Cancer, onset age 70-74 years.   • Colon cancer Brother    • Diabetes Other      Social History     Tobacco Use   • Smoking status: Former Smoker   • Smokeless tobacco: Never Used   Substance Use Topics   • Alcohol use: No   • Drug use: No         Current Outpatient Medications:   •  calcium  carbonate (TUMS) 500 MG chewable tablet, Chew 2 tablets Daily., Disp: , Rfl:   •  Cholecalciferol (VITAMIN D3) 74368 units capsule, Take 1 capsule by mouth Every 7 (Seven) Days., Disp: 12 capsule, Rfl: 11  •  esomeprazole (NEXIUM) 40 MG capsule, Brand name Nexium, one daily, Disp: 90 capsule, Rfl: 3  •  levothyroxine (SYNTHROID, LEVOTHROID) 88 MCG tablet, Take 1 tab daily Monday to Saturday and 1.5 tabs on Sunday, Disp: 96 tablet, Rfl: 3  •  fluticasone (FLONASE) 50 MCG/ACT nasal spray, 2 sprays each nostril once a day, Disp: 10 mL, Rfl: 0    Review of Systems    Review of Systems   Constitutional: Negative for activity change, appetite change, chills, diaphoresis, fatigue, fever and unexpected weight change.   HENT: Negative for congestion, drooling, ear discharge, ear pain, facial swelling, mouth sores, nosebleeds, postnasal drip, sinus pressure, sneezing, sore throat, tinnitus, trouble swallowing and voice change.    Eyes: Negative.  Negative for photophobia, pain, discharge, redness and itching.   Respiratory: Negative.  Negative for apnea, cough, choking, chest tightness, shortness of breath, wheezing and stridor.    Cardiovascular: Negative.  Negative for chest pain, palpitations and leg swelling.   Gastrointestinal: Negative.  Negative for abdominal distention, abdominal pain, constipation, diarrhea, nausea and vomiting.   Endocrine: Negative.  Negative for cold intolerance, heat intolerance, polydipsia, polyphagia and polyuria.   Genitourinary: Negative for difficulty urinating, dysuria, flank pain and frequency.   Musculoskeletal: Negative for arthralgias, back pain, gait problem, joint swelling, myalgias, neck pain and neck stiffness.   Skin: Negative for color change, pallor, rash and wound.   Allergic/Immunologic: Negative for environmental allergies, food allergies and immunocompromised state.   Neurological: Negative for dizziness, tremors, seizures, syncope, facial asymmetry, speech difficulty,  "weakness, light-headedness, numbness and headaches.   Hematological: Negative for adenopathy. Does not bruise/bleed easily.   Psychiatric/Behavioral: Negative for agitation, behavioral problems, confusion, decreased concentration, dysphoric mood, hallucinations, self-injury, sleep disturbance and suicidal ideas. The patient is not nervous/anxious and is not hyperactive.         Objective:     /72   Pulse 83   Ht 167.6 cm (66\")   Wt 78.6 kg (173 lb 3.2 oz)   LMP  (LMP Unknown)   SpO2 98%   BMI 27.96 kg/m²     Physical Exam   Constitutional: She is oriented to person, place, and time. She appears well-developed.   HENT:   Head: Normocephalic.   Right Ear: External ear normal.   Left Ear: External ear normal.   Nose: Nose normal.   Eyes: Conjunctivae and EOM are normal. No scleral icterus.   Neck: Normal range of motion. Neck supple. No tracheal deviation present. No thyromegaly present.   Cardiovascular: Normal rate, regular rhythm, normal heart sounds and intact distal pulses. Exam reveals no gallop and no friction rub.   No murmur heard.  Pulmonary/Chest: Effort normal and breath sounds normal. No stridor. No respiratory distress. She has no wheezes. She has no rales. She exhibits no tenderness.   Abdominal: Soft. Bowel sounds are normal. She exhibits no distension and no mass. There is no tenderness. There is no rebound and no guarding.   Musculoskeletal: Normal range of motion. She exhibits no tenderness or deformity.   Lymphadenopathy:     She has no cervical adenopathy.   Neurological: She is alert and oriented to person, place, and time. She displays normal reflexes. She exhibits normal muscle tone. Coordination normal.   Skin: No rash noted. No erythema. No pallor.   Psychiatric: She has a normal mood and affect. Her behavior is normal. Judgment and thought content normal.       Lab Review            Assessment/Plan       ICD-10-CM ICD-9-CM   1. Hypothyroidism due to Hashimoto's thyroiditis E03.8 " 244.8    E06.3 245.2   2. Vitamin D deficiency E55.9 268.9   3. B12 deficiency E53.8 266.2   4. Secondary polycythemia D75.1 289.0           Hypothyroidism    on Levothyroxine 88 mcgs , 6.5 tabs per week - one hour before supper or at bedtime    Please go back to daily - increase to 7.5    Also on nexium and ca + vit D - in a.m.     Lab Results   Component Value Date    TSH 3.050 10/31/2019    TSH 1.320 04/29/2019    TSH 1.800 10/31/2018               ---    vit D Def, Osteopenia    Lab Results   Component Value Date    CRYC03ON 62.7 10/31/2019    YRXK30JW 23.1 (L) 04/29/2019    HKUP60LC 33.5 10/31/2018         DXA , May 2018  on 600 D + 400 Calcium    add extra 1000 u daily - stop     Add 50 th u every 2 weeks - every week    ---    Pseudonodules last US from June 2015 compared to Nov 2016     Repeat and if stable that would be plast     --    on nexium  she rightufully requests for Mg assessment and nl     --    Sec polycythemia    reasses    If persistent do sleep study - resolved    Lab Results   Component Value Date    WBC 5.56 10/31/2019    HGB 15.4 10/31/2019    HCT 45.5 10/31/2019    MCV 88.9 10/31/2019     10/31/2019           I reviewed and summarized records from Barbie Murray MD from present year  and I reviewed / ordered labs.     No orders of the defined types were placed in this encounter.        A copy of my note was sent to Barbie Murray MD    Please see my above opinion and suggestions.

## 2020-01-06 ENCOUNTER — OFFICE VISIT (OUTPATIENT)
Dept: GASTROENTEROLOGY | Facility: CLINIC | Age: 75
End: 2020-01-06

## 2020-01-06 VITALS
BODY MASS INDEX: 27.74 KG/M2 | SYSTOLIC BLOOD PRESSURE: 132 MMHG | HEIGHT: 66 IN | DIASTOLIC BLOOD PRESSURE: 82 MMHG | WEIGHT: 172.6 LBS | HEART RATE: 87 BPM

## 2020-01-06 DIAGNOSIS — K44.9 HIATAL HERNIA: ICD-10-CM

## 2020-01-06 DIAGNOSIS — R10.10 PAIN OF UPPER ABDOMEN: ICD-10-CM

## 2020-01-06 DIAGNOSIS — K21.00 GASTROESOPHAGEAL REFLUX DISEASE WITH ESOPHAGITIS: Primary | ICD-10-CM

## 2020-01-06 PROCEDURE — 99214 OFFICE O/P EST MOD 30 MIN: CPT | Performed by: PHYSICIAN ASSISTANT

## 2020-01-06 RX ORDER — DEXTROSE AND SODIUM CHLORIDE 5; .45 G/100ML; G/100ML
30 INJECTION, SOLUTION INTRAVENOUS CONTINUOUS PRN
Status: CANCELLED | OUTPATIENT
Start: 2020-01-07

## 2020-01-06 NOTE — PATIENT INSTRUCTIONS

## 2020-01-06 NOTE — PROGRESS NOTES
Chief Complaint   Patient presents with   • Heartburn       ENDO PROCEDURE ORDERED: EGD poss dilation, abd pain    Subjective    Akiko Blackwell is a 74 y.o. female. she is here today as a self referral.    History of Present Illness    This 74-year-old female was added on today after calling with increasing reflux symptoms and abdominal discomfort.  I had last seen her for long-standing GERD, diverticular disease on 05//23/2016.  At that time, she was doing well on Nexium.  She has known diverticular disease with a family history of colon cancer.  She underwent colonoscopy on 06/20/2016 which showed diverticulosis of the sigmoid and descending colon and hemorrhoids with a recommended repeat colonoscopy at 5 years.  Prior EGD on 06/20/2012 showed she had a hiatal hernia, grade 3, esophagitis and gastritis.  She has had a brother die of colon cancer.  Prior colonoscopy by Dr. Mojica in 2011 showed diverticular disease.      Patient last saw Dr. Murray for GERD 12/03/2018, has a followup with her soon.  She saw Dr. Reed for hypothyroidism 11/06/2019.  Last laboratory 10/31/2019 showed normal magnesium, CA-125, TSH, B12, vitamin D, and CBC.  CMP showed CO2 of 32.5, otherwise normal.      Patient has had long-standing GERD.  She has had some increasing burning substernally.  She had some right upper quadrant pain,usually worse with movement. It feels like a rib gets displaced, it will move back into place and she has no symptoms.  She has been coughing a lot.  It started around Thanksgiving.  She feels like she has a scratchy throat.  She will cough up white mucus.  She has had some burning and regurgitation but no dysphagia.  She does not feel like it is allergies.  She has had no fever.   She had gotten a new pillow before this started.  She changed back to her old pillow, but it did not seem to make any difference.  Bowels are moving without blood or mucus.  Weight is up 1.2 pounds since last visit.       ASSESSMENT AND PLAN:  Patient with long-standing reflux, known hiatal hernia, and possible peptic stricture.  Recommend EGD with possible dilatation.  We will do biopsies as indicated.  We will continue on the Nexium for now, but we will plan likely medication change after endoscopy.  Would consider ENT evaluation.  I did talk to her about the possibility of whooping cough or other cause to her cough.  She will be due for colonoscopy next year.  We will plan followup after the above.  Further pending clinical course and the results of the above.         The following portions of the patient's history were reviewed and updated as appropriate:   Past Medical History:   Diagnosis Date   • Acute bronchitis, unspecified    • Acute laryngitis    • Allergic rhinitis    • Atrophic vaginitis    • Breast cyst    • Diverticular disease of colon    • Encounter for gynecological examination (general) (routine) without abnormal findings    • Encounter for screening for malignant neoplasm of colon    • Encounter for screening for osteoporosis    • Esophagitis    • Family history of malignant neoplasm of gastrointestinal tract    • GERD (gastroesophageal reflux disease)    • History of bone density study 04/29/2016    DXA BONE DENSITY AXIAL [facility] (2)    • History of screening mammography 01/02/2016   • Osteopenia    • Pain in right knee    • Subclinical hypothyroidism    • Thyroid nodule    • Viral upper respiratory tract infection    • Vitamin D deficiency      Past Surgical History:   Procedure Laterality Date   • BREAST BIOPSY      BX BREAST PERCUT W/O IMAGE 90344 (1)   x2   • CERVIX SURGERY  05/15/1973    Conization biopsy of cervix. Cervical cautery.   • COLONOSCOPY  06/20/2016    Diverticulosis in the sigmoid colon and in the descending colon.External and internal hemorrhoids.No specimens collected.   • DILATATION AND CURETTAGE  10/10/1990    Fractional   • ENDOSCOPY W/ PEG TUBE PLACEMENT  06/20/2012     Esophagitis seen. Biopsy taken. Gastritis in stomach. Biopsy taken. Hiatus hernia in stomach. Normal duodenum. Biopsy taken.   • EXCISION LESION  1967    Excision of inclusion cyst, vagina.   • INJECTION OF MEDICATION  2016    Kenalog (4)      • PAP SMEAR  2010    Mild inflammation present. Negative   • TONSILLECTOMY  01/15/1964    Recurring acute tonsillitis.   • UPPER GASTROINTESTINAL ENDOSCOPY  2012     Family History   Problem Relation Age of Onset   • Ovarian cancer Mother         Onset age 70-74 years.   • Cancer Brother         Colorectal Cancer, onset age 70-74 years.   • Colon cancer Brother    • Diabetes Other      OB History        2    Para   2    Term   2            AB        Living           SAB        TAB        Ectopic        Molar        Multiple        Live Births                  No Known Allergies  Social History     Socioeconomic History   • Marital status:      Spouse name: Not on file   • Number of children: Not on file   • Years of education: Not on file   • Highest education level: Not on file   Tobacco Use   • Smoking status: Former Smoker   • Smokeless tobacco: Never Used   Substance and Sexual Activity   • Alcohol use: No   • Drug use: No   • Sexual activity: Defer     Current Medications:  Prior to Admission medications    Medication Sig Start Date End Date Taking? Authorizing Provider   calcium carbonate (TUMS) 500 MG chewable tablet Chew 2 tablets Daily.   Yes Provider, MD Elmo   Cholecalciferol (VITAMIN D3) 1.25 MG (70991 UT) capsule Take 1 capsule by mouth Every 7 (Seven) Days. 19  Yes Maximino Marina MD   esomeprazole (NEXIUM) 40 MG capsule Brand name Nexium, one daily 19  Yes Barbie Murray MD   levothyroxine (SYNTHROID, LEVOTHROID) 88 MCG tablet Take 1 tab daily Monday to Saturday and 1.5 tabs on 19  Yes Maximino Marina MD     Review of Systems  Review of Systems   HENT: Negative for  "trouble swallowing.    Gastrointestinal: Positive for abdominal pain. Negative for abdominal distention, anal bleeding, blood in stool, constipation, diarrhea, nausea, rectal pain and vomiting.   Genitourinary: Negative for difficulty urinating.          Objective    /82 (BP Location: Left arm)   Pulse 87   Ht 167.6 cm (66\")   Wt 78.3 kg (172 lb 9.6 oz)   LMP  (LMP Unknown)   BMI 27.86 kg/m²   Physical Exam   Constitutional: She is oriented to person, place, and time. She appears well-developed and well-nourished. No distress.   HENT:   Head: Normocephalic and atraumatic.   Eyes: Pupils are equal, round, and reactive to light. EOM are normal.   Neck: Normal range of motion.   Cardiovascular: Normal rate, regular rhythm and normal heart sounds.   Pulmonary/Chest: Effort normal and breath sounds normal.   Abdominal: Soft. Bowel sounds are normal. She exhibits no shifting dullness, no distension, no abdominal bruit, no ascites and no mass. There is no hepatosplenomegaly. There is tenderness. There is no rigidity, no rebound, no guarding and no CVA tenderness. No hernia. Hernia confirmed negative in the ventral area.   mild   Musculoskeletal: Normal range of motion.   Neurological: She is alert and oriented to person, place, and time.   Skin: Skin is warm and dry.   Psychiatric: She has a normal mood and affect. Her behavior is normal. Judgment and thought content normal.   Nursing note and vitals reviewed.    Assessment/Plan      1. Gastroesophageal reflux disease with esophagitis    2. Hiatal hernia    3. Pain of upper abdomen    .   Akiko was seen today for heartburn.    Diagnoses and all orders for this visit:    Gastroesophageal reflux disease with esophagitis  -     Case Request; Standing  -     Case Request    Hiatal hernia  -     Case Request; Standing  -     Case Request    Pain of upper abdomen  -     Case Request; Standing  -     Case Request    Other orders  -     Follow Anesthesia Guidelines / " Standing Orders; Future  -     Obtain Informed Consent; Future        Orders placed during this encounter include:  Orders Placed This Encounter   Procedures   • Follow Anesthesia Guidelines / Standing Orders     Standing Status:   Future   • Obtain Informed Consent     Standing Status:   Future     Order Specific Question:   Informed Consent Given For     Answer:   ESOPHAGOGASTRODUODENOSCOPY WITH DILATATION       Medications prescribed:  No orders of the defined types were placed in this encounter.      Requested Prescriptions      No prescriptions requested or ordered in this encounter       Review and/or summary of lab tests, radiology, procedures, medications. Review and summary of old records and obtaining of history. The risks and benefits of my recommendations, as well as other treatment options were discussed with the patient today. Questions were answered.    Follow-up: Return in about 1 month (around 2/6/2020), or if symptoms worsen or fail to improve.     ESOPHAGOGASTRODUODENOSCOPY WITH DILATATION--possible (N/A)      This document has been electronically signed by Henry Caballero PA-C on January 6, 2020 5:24 PM      Results for orders placed or performed in visit on 05/06/19   CBC Auto Differential   Result Value Ref Range    WBC 5.56 3.40 - 10.80 10*3/mm3    RBC 5.12 3.77 - 5.28 10*6/mm3    Hemoglobin 15.4 12.0 - 15.9 g/dL    Hematocrit 45.5 34.0 - 46.6 %    MCV 88.9 79.0 - 97.0 fL    MCH 30.1 26.6 - 33.0 pg    MCHC 33.8 31.5 - 35.7 g/dL    RDW 12.7 12.3 - 15.4 %    RDW-SD 41.8 37.0 - 54.0 fl    MPV 10.5 6.0 - 12.0 fL    Platelets 295 140 - 450 10*3/mm3    Neutrophil % 59.7 42.7 - 76.0 %    Lymphocyte % 30.0 19.6 - 45.3 %    Monocyte % 7.7 5.0 - 12.0 %    Eosinophil % 1.8 0.3 - 6.2 %    Basophil % 0.4 0.0 - 1.5 %    Immature Grans % 0.4 0.0 - 0.5 %    Neutrophils, Absolute 3.32 1.70 - 7.00 10*3/mm3    Lymphocytes, Absolute 1.67 0.70 - 3.10 10*3/mm3    Monocytes, Absolute 0.43 0.10 - 0.90 10*3/mm3     Eosinophils, Absolute 0.10 0.00 - 0.40 10*3/mm3    Basophils, Absolute 0.02 0.00 - 0.20 10*3/mm3    Immature Grans, Absolute 0.02 0.00 - 0.05 10*3/mm3    nRBC 0.0 0.0 - 0.2 /100 WBC   Vitamin D 25 Hydroxy   Result Value Ref Range    25 Hydroxy, Vitamin D 62.7 30.0 - 100.0 ng/ml      Result Value Ref Range     9.9 0.0 - 38.1 U/mL   TSH   Result Value Ref Range    TSH 3.050 0.270 - 4.200 uIU/mL   Magnesium   Result Value Ref Range    Magnesium 2.2 1.6 - 2.4 mg/dL   Vitamin B12   Result Value Ref Range    Vitamin B-12 337 211 - 946 pg/mL   Comprehensive Metabolic Panel   Result Value Ref Range    Glucose 97 65 - 99 mg/dL    BUN 11 8 - 23 mg/dL    Creatinine 0.85 0.57 - 1.00 mg/dL    Sodium 142 136 - 145 mmol/L    Potassium 4.4 3.5 - 5.2 mmol/L    Chloride 101 98 - 107 mmol/L    CO2 32.8 (H) 22.0 - 29.0 mmol/L    Calcium 9.6 8.6 - 10.5 mg/dL    Total Protein 7.1 6.0 - 8.5 g/dL    Albumin 4.30 3.50 - 5.20 g/dL    ALT (SGPT) 14 1 - 33 U/L    AST (SGOT) 16 1 - 32 U/L    Alkaline Phosphatase 99 39 - 117 U/L    Total Bilirubin 0.5 0.2 - 1.2 mg/dL    eGFR Non African Amer 65 >60 mL/min/1.73    Globulin 2.8 gm/dL    A/G Ratio 1.5 g/dL    BUN/Creatinine Ratio 12.9 7.0 - 25.0    Anion Gap 8.2 5.0 - 15.0 mmol/L   Results for orders placed or performed in visit on 04/29/19   CBC Auto Differential   Result Value Ref Range    WBC 8.15 3.40 - 10.80 10*3/mm3    RBC 5.00 3.77 - 5.28 10*6/mm3    Hemoglobin 15.2 12.0 - 15.9 g/dL    Hematocrit 44.9 34.0 - 46.6 %    MCV 89.8 79.0 - 97.0 fL    MCH 30.4 26.6 - 33.0 pg    MCHC 33.9 31.5 - 35.7 g/dL    RDW 12.3 12.3 - 15.4 %    RDW-SD 40.2 37.0 - 54.0 fl    MPV 10.5 6.0 - 12.0 fL    Platelets 322 140 - 450 10*3/mm3    Neutrophil % 56.9 42.7 - 76.0 %    Lymphocyte % 33.0 19.6 - 45.3 %    Monocyte % 8.6 5.0 - 12.0 %    Eosinophil % 0.9 0.3 - 6.2 %    Basophil % 0.4 0.0 - 1.5 %    Immature Grans % 0.2 0.0 - 0.5 %    Neutrophils, Absolute 4.64 1.70 - 7.00 10*3/mm3    Lymphocytes,  Absolute 2.69 0.70 - 3.10 10*3/mm3    Monocytes, Absolute 0.70 0.10 - 0.90 10*3/mm3    Eosinophils, Absolute 0.07 0.00 - 0.40 10*3/mm3    Basophils, Absolute 0.03 0.00 - 0.20 10*3/mm3    Immature Grans, Absolute 0.02 0.00 - 0.05 10*3/mm3    nRBC 0.1 0.0 - 0.2 /100 WBC   Vitamin D 25 Hydroxy   Result Value Ref Range    25 Hydroxy, Vitamin D 23.1 (L) 30.0 - 100.0 ng/ml   TSH   Result Value Ref Range    TSH 1.320 0.270 - 4.200 mIU/mL   Comprehensive Metabolic Panel   Result Value Ref Range    Glucose 95 65 - 99 mg/dL    BUN 13 8 - 23 mg/dL    Creatinine 0.84 0.57 - 1.00 mg/dL    Sodium 143 136 - 145 mmol/L    Potassium 4.3 3.5 - 5.2 mmol/L    Chloride 105 98 - 107 mmol/L    CO2 26.5 22.0 - 29.0 mmol/L    Calcium 10.1 8.6 - 10.5 mg/dL    Total Protein 6.8 6.0 - 8.5 g/dL    Albumin 4.20 3.50 - 5.20 g/dL    ALT (SGPT) 17 1 - 33 U/L    AST (SGOT) 18 1 - 32 U/L    Alkaline Phosphatase 95 39 - 117 U/L    Total Bilirubin 0.5 0.2 - 1.2 mg/dL    eGFR Non African Amer 66 >60 mL/min/1.73    Globulin 2.6 gm/dL    A/G Ratio 1.6 g/dL    BUN/Creatinine Ratio 15.5 7.0 - 25.0    Anion Gap 11.5 mmol/L   Results for orders placed or performed during the hospital encounter of 03/14/19   POCT Influenza A/B   Result Value Ref Range    Rapid Influenza A Ag Negative Negative    Rapid Influenza B Ag Negative Negative    Internal Control Passed Passed    Lot Number [id number]     Expiration Date 10/22/21    Results for orders placed or performed in visit on 10/31/18   CBC Auto Differential   Result Value Ref Range    WBC 6.31 3.20 - 9.80 10*3/mm3    RBC 5.02 3.77 - 5.16 10*6/mm3    Hemoglobin 15.5 12.0 - 15.5 g/dL    Hematocrit 44.2 35.0 - 45.0 %    MCV 88.0 80.0 - 98.0 fL    MCH 30.9 26.5 - 34.0 pg    MCHC 35.1 31.4 - 36.0 g/dL    RDW 12.5 11.5 - 14.5 %    RDW-SD 39.9 36.4 - 46.3 fl    MPV 9.9 8.0 - 12.0 fL    Platelets 311 150 - 450 10*3/mm3    Neutrophil % 67.9 37.0 - 80.0 %    Lymphocyte % 23.8 10.0 - 50.0 %    Monocyte % 6.8 0.0 - 12.0 %     Eosinophil % 0.8 0.0 - 7.0 %    Basophil % 0.5 0.0 - 2.0 %    Immature Grans % 0.2 0.0 - 0.5 %    Neutrophils, Absolute 4.29 2.00 - 8.60 10*3/mm3    Lymphocytes, Absolute 1.50 0.60 - 4.20 10*3/mm3    Monocytes, Absolute 0.43 0.00 - 0.90 10*3/mm3    Eosinophils, Absolute 0.05 0.00 - 0.70 10*3/mm3    Basophils, Absolute 0.03 0.00 - 0.20 10*3/mm3    Immature Grans, Absolute 0.01 0.00 - 0.02 10*3/mm3     *Note: Due to a large number of results and/or encounters for the requested time period, some results have not been displayed. A complete set of results can be found in Results Review.       Some portions of this note have been dictated using voice recognition software and may contain errors and/or omissions.

## 2020-01-07 ENCOUNTER — ANESTHESIA EVENT (OUTPATIENT)
Dept: GASTROENTEROLOGY | Facility: HOSPITAL | Age: 75
End: 2020-01-07

## 2020-01-07 ENCOUNTER — ANESTHESIA (OUTPATIENT)
Dept: GASTROENTEROLOGY | Facility: HOSPITAL | Age: 75
End: 2020-01-07

## 2020-01-07 ENCOUNTER — HOSPITAL ENCOUNTER (OUTPATIENT)
Facility: HOSPITAL | Age: 75
Setting detail: HOSPITAL OUTPATIENT SURGERY
Discharge: HOME | End: 2020-01-07
Attending: INTERNAL MEDICINE | Admitting: INTERNAL MEDICINE

## 2020-01-07 VITALS
TEMPERATURE: 97.6 F | HEIGHT: 66 IN | OXYGEN SATURATION: 95 % | BODY MASS INDEX: 27.32 KG/M2 | HEART RATE: 89 BPM | WEIGHT: 170 LBS | RESPIRATION RATE: 18 BRPM | SYSTOLIC BLOOD PRESSURE: 133 MMHG | DIASTOLIC BLOOD PRESSURE: 67 MMHG

## 2020-01-07 DIAGNOSIS — K21.00 GASTROESOPHAGEAL REFLUX DISEASE WITH ESOPHAGITIS: ICD-10-CM

## 2020-01-07 DIAGNOSIS — K44.9 HIATAL HERNIA: ICD-10-CM

## 2020-01-07 DIAGNOSIS — R10.10 PAIN OF UPPER ABDOMEN: ICD-10-CM

## 2020-01-07 PROCEDURE — 88305 TISSUE EXAM BY PATHOLOGIST: CPT | Performed by: PATHOLOGY

## 2020-01-07 PROCEDURE — 88305 TISSUE EXAM BY PATHOLOGIST: CPT | Performed by: INTERNAL MEDICINE

## 2020-01-07 PROCEDURE — 25010000002 FENTANYL CITRATE (PF) 100 MCG/2ML SOLUTION: Performed by: NURSE ANESTHETIST, CERTIFIED REGISTERED

## 2020-01-07 PROCEDURE — 25010000002 PROPOFOL 10 MG/ML EMULSION: Performed by: NURSE ANESTHETIST, CERTIFIED REGISTERED

## 2020-01-07 PROCEDURE — 43239 EGD BIOPSY SINGLE/MULTIPLE: CPT | Performed by: INTERNAL MEDICINE

## 2020-01-07 RX ORDER — LIDOCAINE HYDROCHLORIDE 20 MG/ML
INJECTION, SOLUTION EPIDURAL; INFILTRATION; INTRACAUDAL; PERINEURAL AS NEEDED
Status: DISCONTINUED | OUTPATIENT
Start: 2020-01-07 | End: 2020-01-07 | Stop reason: SURG

## 2020-01-07 RX ORDER — FENTANYL CITRATE 50 UG/ML
INJECTION, SOLUTION INTRAMUSCULAR; INTRAVENOUS AS NEEDED
Status: DISCONTINUED | OUTPATIENT
Start: 2020-01-07 | End: 2020-01-07 | Stop reason: SURG

## 2020-01-07 RX ORDER — PROPOFOL 10 MG/ML
VIAL (ML) INTRAVENOUS AS NEEDED
Status: DISCONTINUED | OUTPATIENT
Start: 2020-01-07 | End: 2020-01-07 | Stop reason: SURG

## 2020-01-07 RX ORDER — DEXTROSE AND SODIUM CHLORIDE 5; .45 G/100ML; G/100ML
30 INJECTION, SOLUTION INTRAVENOUS CONTINUOUS PRN
Status: DISCONTINUED | OUTPATIENT
Start: 2020-01-07 | End: 2020-01-07 | Stop reason: HOSPADM

## 2020-01-07 RX ADMIN — LIDOCAINE HYDROCHLORIDE 100 MG: 20 INJECTION, SOLUTION EPIDURAL; INFILTRATION; INTRACAUDAL at 15:59

## 2020-01-07 RX ADMIN — FENTANYL CITRATE 50 MCG: 50 INJECTION, SOLUTION INTRAMUSCULAR; INTRAVENOUS at 15:56

## 2020-01-07 RX ADMIN — PROPOFOL 50 MG: 10 INJECTION, EMULSION INTRAVENOUS at 15:59

## 2020-01-07 RX ADMIN — DEXTROSE AND SODIUM CHLORIDE 30 ML/HR: 5; 450 INJECTION, SOLUTION INTRAVENOUS at 15:43

## 2020-01-07 RX ADMIN — PROPOFOL 20 MG: 10 INJECTION, EMULSION INTRAVENOUS at 16:01

## 2020-01-07 RX ADMIN — PROPOFOL 20 MG: 10 INJECTION, EMULSION INTRAVENOUS at 16:02

## 2020-01-07 NOTE — ANESTHESIA PREPROCEDURE EVALUATION
Anesthesia Evaluation     Patient summary reviewed and Nursing notes reviewed   NPO Solid Status: > 8 hours  NPO Liquid Status: > 8 hours           Airway   Mallampati: III  TM distance: >3 FB  Neck ROM: full  No difficulty expected  Dental - normal exam     Pulmonary - normal exam   (+) recent URI,   Cardiovascular - normal exam        Neuro/Psych  GI/Hepatic/Renal/Endo    (+)  hiatal hernia, GERD,      Musculoskeletal     Abdominal    Substance History      OB/GYN          Other                        Anesthesia Plan    ASA 3     MAC     intravenous induction     Anesthetic plan, all risks, benefits, and alternatives have been provided, discussed and informed consent has been obtained with: patient.    Plan discussed with CRNA.

## 2020-01-07 NOTE — ANESTHESIA POSTPROCEDURE EVALUATION
Patient: Akiko Blackwell    Procedure Summary     Date:  01/07/20 Room / Location:  [facility] ENDOSCOPY 2 / [facility] ENDOSCOPY    Anesthesia Start:  1556 Anesthesia Stop:  1604    Procedure:  ESOPHAGOGASTRODUODENOSCOPY WITH DILATATION--possible (N/A ) Diagnosis:       Gastroesophageal reflux disease with esophagitis      Hiatal hernia      Pain of upper abdomen      (Gastroesophageal reflux disease with esophagitis [K21.0])      (Hiatal hernia [K44.9])      (Pain of upper abdomen [R10.10])    Surgeon:  Alfonzo Wahl MD Provider:  Iban Dumont CRNA    Anesthesia Type:  MAC ASA Status:  3          Anesthesia Type: MAC    Vitals  No vitals data found for the desired time range.          Post Anesthesia Care and Evaluation    Patient location during evaluation: bedside  Patient participation: waiting for patient participation  Level of consciousness: responsive to physical stimuli  Pain score: 0  Pain management: adequate  Airway patency: patent  Anesthetic complications: No anesthetic complications  PONV Status: none  Cardiovascular status: acceptable  Respiratory status: acceptable, unassisted, room air and spontaneous ventilation  Hydration status: acceptable

## 2020-01-07 NOTE — H&P
No chief complaint on file.      ENDO PROCEDURE ORDERED: EGD poss dilation, abd pain    Subjective    Akiko Blackwell is a 74 y.o. female. she is here today as a self referral.    History of Present Illness     Dictation on: 01/06/2020  5:27 PM by: ENA JACOBS [[id number]]      The following portions of the patient's history were reviewed and updated as appropriate:   Past Medical History:   Diagnosis Date   • Acute bronchitis, unspecified    • Acute laryngitis    • Allergic rhinitis    • Atrophic vaginitis    • Breast cyst    • Diverticular disease of colon    • Encounter for gynecological examination (general) (routine) without abnormal findings    • Encounter for screening for malignant neoplasm of colon    • Encounter for screening for osteoporosis    • Esophagitis    • Family history of malignant neoplasm of gastrointestinal tract    • GERD (gastroesophageal reflux disease)    • History of bone density study 04/29/2016    DXA BONE DENSITY AXIAL [address] [facility] (2)    • History of screening mammography 01/02/2016   • Osteopenia    • Pain in right knee    • Subclinical hypothyroidism    • Thyroid nodule    • Viral upper respiratory tract infection    • Vitamin D deficiency      Past Surgical History:   Procedure Laterality Date   • BREAST BIOPSY      BX BREAST PERCUT W/O IMAGE 03328 (1)   x2   • CERVIX SURGERY  05/15/1973    Conization biopsy of cervix. Cervical cautery.   • COLONOSCOPY  06/20/2016    Diverticulosis in the sigmoid colon and in the descending colon.External and internal hemorrhoids.No specimens collected.   • DILATATION AND CURETTAGE  10/10/1990    Fractional   • ENDOSCOPY W/ PEG TUBE PLACEMENT  06/20/2012    Esophagitis seen. Biopsy taken. Gastritis in stomach. Biopsy taken. Hiatus hernia in stomach. Normal duodenum. Biopsy taken.   • EXCISION LESION  05/18/1967    Excision of inclusion cyst, vagina.   • INJECTION OF MEDICATION  05/04/2016    Kenalog (4)      • PAP SMEAR  07/22/2010     Mild inflammation present. Negative   • TONSILLECTOMY  01/15/1964    Recurring acute tonsillitis.   • UPPER GASTROINTESTINAL ENDOSCOPY  2012     Family History   Problem Relation Age of Onset   • Ovarian cancer Mother         Onset age 70-74 years.   • Cancer Brother         Colorectal Cancer, onset age 70-74 years.   • Colon cancer Brother    • Diabetes Other      OB History        2    Para   2    Term   2            AB        Living           SAB        TAB        Ectopic        Molar        Multiple        Live Births                  No Known Allergies  Social History     Socioeconomic History   • Marital status:      Spouse name: Not on file   • Number of children: Not on file   • Years of education: Not on file   • Highest education level: Not on file   Tobacco Use   • Smoking status: Former Smoker   • Smokeless tobacco: Never Used   Substance and Sexual Activity   • Alcohol use: No   • Drug use: No   • Sexual activity: Defer     Current Medications:  Prior to Admission medications    Medication Sig Start Date End Date Taking? Authorizing Provider   calcium carbonate (TUMS) 500 MG chewable tablet Chew 2 tablets Daily.   Yes Provider, MD Elmo   Cholecalciferol (VITAMIN D3) 1.25 MG (59042 UT) capsule Take 1 capsule by mouth Every 7 (Seven) Days. 19  Yes Maximino Marina MD   esomeprazole (NEXIUM) 40 MG capsule Brand name Nexium, one daily 19  Yes Barbie Murray MD   levothyroxine (SYNTHROID, LEVOTHROID) 88 MCG tablet Take 1 tab daily Monday to Saturday and 1.5 tabs on 19  Yes Maximino Marina MD     Review of Systems  Review of Systems   HENT: Negative for trouble swallowing.    Gastrointestinal: Positive for abdominal pain. Negative for abdominal distention, anal bleeding, blood in stool, constipation, diarrhea, nausea, rectal pain and vomiting.   Genitourinary: Negative for difficulty urinating.          Objective    /76    "Pulse 84   Temp 96.8 °F (36 °C)   Resp 18   Ht 167.6 cm (66\")   Wt 77.1 kg (170 lb)   LMP  (LMP Unknown)   SpO2 98%   BMI 27.44 kg/m²   Physical Exam   Constitutional: She is oriented to person, place, and time. She appears well-developed and well-nourished. No distress.   HENT:   Head: Normocephalic and atraumatic.   Eyes: Pupils are equal, round, and reactive to light. EOM are normal.   Neck: Normal range of motion.   Cardiovascular: Normal rate, regular rhythm and normal heart sounds.   Pulmonary/Chest: Effort normal and breath sounds normal.   Abdominal: Soft. Bowel sounds are normal. She exhibits no shifting dullness, no distension, no abdominal bruit, no ascites and no mass. There is no hepatosplenomegaly. There is tenderness. There is no rigidity, no rebound, no guarding and no CVA tenderness. No hernia. Hernia confirmed negative in the ventral area.   mild   Musculoskeletal: Normal range of motion.   Neurological: She is alert and oriented to person, place, and time.   Skin: Skin is warm and dry.   Psychiatric: She has a normal mood and affect. Her behavior is normal. Judgment and thought content normal.   Nursing note and vitals reviewed.    Assessment/Plan      1. Gastroesophageal reflux disease with esophagitis    2. Hiatal hernia    3. Pain of upper abdomen    .   Akiko was seen today for heartburn.    Diagnoses and all orders for this visit:    Gastroesophageal reflux disease with esophagitis  -     Case Request; Standing  -     Case Request    Hiatal hernia  -     Case Request; Standing  -     Case Request    Pain of upper abdomen  -     Case Request; Standing  -     Case Request    Other orders  -     Follow Anesthesia Guidelines / Standing Orders; Future  -     Obtain Informed Consent; Future        Orders placed during this encounter include:  Orders Placed This Encounter   Procedures   • Obtain Informed Consent     Standing Status:   Standing     Number of Occurrences:   1     Order Specific " Question:   Informed Consent Given For     Answer:   ESOPHAGOGASTRODUODENOSCOPY WITH DILATATION   • Vital Signs Every 5 Minutes for 15 Minutes, Every 15 Minutes Thereafter.     Standing Status:   Standing     Number of Occurrences:   1   • Notify Anesthesia of Any Acute Changes in Patient Condition     Standing Status:   Standing     Number of Occurrences:   1     Order Specific Question:   Other     Answer:   Any Acute Changes in Patient Condition   • Notify Anesthesia for Unrelieved Pain     Standing Status:   Standing     Number of Occurrences:   1     Order Specific Question:   Other     Answer:   Unrelieved Pain   • Pulse Oximetry, Continuous     Standing Status:   Standing     Number of Occurrences:   1   • Once Discharge Criteria to Floor Met, Follow Surgeon Orders     Standing Status:   Standing     Number of Occurrences:   1   • Discharge Patient From PACU When Discharge Criteria Met     Standing Status:   Standing     Number of Occurrences:   1   • Oxygen Therapy- Blow by - Humidified; Titrate for SPO2: equal to or greater than, 96%, per policy     Standing Status:   Standing     Number of Occurrences:   1     Order Specific Question:   Device     Answer:   Blow by - Humidified     Order Specific Question:   Titrate for SPO2     Answer:   equal to or greater than     Order Specific Question:   Titrate for SPO2     Answer:   96%     Order Specific Question:   Titrate for SPO2     Answer:   per policy     Order Specific Question:   Indications for Oxygen Therapy     Answer:   Immediate Post-Op Period   • POC Glucose Once     Prior to Procedure on ALL Diabetic Patients     Standing Status:   Standing     Number of Occurrences:   1       Medications prescribed:  New Medications Ordered This Visit   Medications   • dextrose 5 % and sodium chloride 0.45 % infusion       Requested Prescriptions      No prescriptions requested or ordered in this encounter       Review and/or summary of lab tests, radiology,  procedures, medications. Review and summary of old records and obtaining of history. The risks and benefits of my recommendations, as well as other treatment options were discussed with the patient today. Questions were answered.    Follow-up: No follow-ups on file.     ESOPHAGOGASTRODUODENOSCOPY WITH DILATATION--possible (N/A)      This document has been electronically signed by Alfonzo Wahl MD on January 7, 2020 3:50 PM      Results for orders placed or performed in visit on 05/06/19   CBC Auto Differential   Result Value Ref Range    WBC 5.56 3.40 - 10.80 10*3/mm3    RBC 5.12 3.77 - 5.28 10*6/mm3    Hemoglobin 15.4 12.0 - 15.9 g/dL    Hematocrit 45.5 34.0 - 46.6 %    MCV 88.9 79.0 - 97.0 fL    MCH 30.1 26.6 - 33.0 pg    MCHC 33.8 31.5 - 35.7 g/dL    RDW 12.7 12.3 - 15.4 %    RDW-SD 41.8 37.0 - 54.0 fl    MPV 10.5 6.0 - 12.0 fL    Platelets 295 140 - 450 10*3/mm3    Neutrophil % 59.7 42.7 - 76.0 %    Lymphocyte % 30.0 19.6 - 45.3 %    Monocyte % 7.7 5.0 - 12.0 %    Eosinophil % 1.8 0.3 - 6.2 %    Basophil % 0.4 0.0 - 1.5 %    Immature Grans % 0.4 0.0 - 0.5 %    Neutrophils, Absolute 3.32 1.70 - 7.00 10*3/mm3    Lymphocytes, Absolute 1.67 0.70 - 3.10 10*3/mm3    Monocytes, Absolute 0.43 0.10 - 0.90 10*3/mm3    Eosinophils, Absolute 0.10 0.00 - 0.40 10*3/mm3    Basophils, Absolute 0.02 0.00 - 0.20 10*3/mm3    Immature Grans, Absolute 0.02 0.00 - 0.05 10*3/mm3    nRBC 0.0 0.0 - 0.2 /100 WBC   Vitamin D 25 Hydroxy   Result Value Ref Range    25 Hydroxy, Vitamin D 62.7 30.0 - 100.0 ng/ml      Result Value Ref Range     9.9 0.0 - 38.1 U/mL   TSH   Result Value Ref Range    TSH 3.050 0.270 - 4.200 uIU/mL   Magnesium   Result Value Ref Range    Magnesium 2.2 1.6 - 2.4 mg/dL   Vitamin B12   Result Value Ref Range    Vitamin B-12 337 211 - 946 pg/mL   Comprehensive Metabolic Panel   Result Value Ref Range    Glucose 97 65 - 99 mg/dL    BUN 11 8 - 23 mg/dL    Creatinine 0.85 0.57 - 1.00 mg/dL    Sodium 142  136 - 145 mmol/L    Potassium 4.4 3.5 - 5.2 mmol/L    Chloride 101 98 - 107 mmol/L    CO2 32.8 (H) 22.0 - 29.0 mmol/L    Calcium 9.6 8.6 - 10.5 mg/dL    Total Protein 7.1 6.0 - 8.5 g/dL    Albumin 4.30 3.50 - 5.20 g/dL    ALT (SGPT) 14 1 - 33 U/L    AST (SGOT) 16 1 - 32 U/L    Alkaline Phosphatase 99 39 - 117 U/L    Total Bilirubin 0.5 0.2 - 1.2 mg/dL    eGFR Non African Amer 65 >60 mL/min/1.73    Globulin 2.8 gm/dL    A/G Ratio 1.5 g/dL    BUN/Creatinine Ratio 12.9 7.0 - 25.0    Anion Gap 8.2 5.0 - 15.0 mmol/L   Results for orders placed or performed in visit on 04/29/19   CBC Auto Differential   Result Value Ref Range    WBC 8.15 3.40 - 10.80 10*3/mm3    RBC 5.00 3.77 - 5.28 10*6/mm3    Hemoglobin 15.2 12.0 - 15.9 g/dL    Hematocrit 44.9 34.0 - 46.6 %    MCV 89.8 79.0 - 97.0 fL    MCH 30.4 26.6 - 33.0 pg    MCHC 33.9 31.5 - 35.7 g/dL    RDW 12.3 12.3 - 15.4 %    RDW-SD 40.2 37.0 - 54.0 fl    MPV 10.5 6.0 - 12.0 fL    Platelets 322 140 - 450 10*3/mm3    Neutrophil % 56.9 42.7 - 76.0 %    Lymphocyte % 33.0 19.6 - 45.3 %    Monocyte % 8.6 5.0 - 12.0 %    Eosinophil % 0.9 0.3 - 6.2 %    Basophil % 0.4 0.0 - 1.5 %    Immature Grans % 0.2 0.0 - 0.5 %    Neutrophils, Absolute 4.64 1.70 - 7.00 10*3/mm3    Lymphocytes, Absolute 2.69 0.70 - 3.10 10*3/mm3    Monocytes, Absolute 0.70 0.10 - 0.90 10*3/mm3    Eosinophils, Absolute 0.07 0.00 - 0.40 10*3/mm3    Basophils, Absolute 0.03 0.00 - 0.20 10*3/mm3    Immature Grans, Absolute 0.02 0.00 - 0.05 10*3/mm3    nRBC 0.1 0.0 - 0.2 /100 WBC   Vitamin D 25 Hydroxy   Result Value Ref Range    25 Hydroxy, Vitamin D 23.1 (L) 30.0 - 100.0 ng/ml   TSH   Result Value Ref Range    TSH 1.320 0.270 - 4.200 mIU/mL   Comprehensive Metabolic Panel   Result Value Ref Range    Glucose 95 65 - 99 mg/dL    BUN 13 8 - 23 mg/dL    Creatinine 0.84 0.57 - 1.00 mg/dL    Sodium 143 136 - 145 mmol/L    Potassium 4.3 3.5 - 5.2 mmol/L    Chloride 105 98 - 107 mmol/L    CO2 26.5 22.0 - 29.0 mmol/L     Calcium 10.1 8.6 - 10.5 mg/dL    Total Protein 6.8 6.0 - 8.5 g/dL    Albumin 4.20 3.50 - 5.20 g/dL    ALT (SGPT) 17 1 - 33 U/L    AST (SGOT) 18 1 - 32 U/L    Alkaline Phosphatase 95 39 - 117 U/L    Total Bilirubin 0.5 0.2 - 1.2 mg/dL    eGFR Non African Amer 66 >60 mL/min/1.73    Globulin 2.6 gm/dL    A/G Ratio 1.6 g/dL    BUN/Creatinine Ratio 15.5 7.0 - 25.0    Anion Gap 11.5 mmol/L   Results for orders placed or performed during the hospital encounter of 03/14/19   POCT Influenza A/B   Result Value Ref Range    Rapid Influenza A Ag Negative Negative    Rapid Influenza B Ag Negative Negative    Internal Control Passed Passed    Lot Number [id number]     Expiration Date 10/22/21    Results for orders placed or performed in visit on 10/31/18   CBC Auto Differential   Result Value Ref Range    WBC 6.31 3.20 - 9.80 10*3/mm3    RBC 5.02 3.77 - 5.16 10*6/mm3    Hemoglobin 15.5 12.0 - 15.5 g/dL    Hematocrit 44.2 35.0 - 45.0 %    MCV 88.0 80.0 - 98.0 fL    MCH 30.9 26.5 - 34.0 pg    MCHC 35.1 31.4 - 36.0 g/dL    RDW 12.5 11.5 - 14.5 %    RDW-SD 39.9 36.4 - 46.3 fl    MPV 9.9 8.0 - 12.0 fL    Platelets 311 150 - 450 10*3/mm3    Neutrophil % 67.9 37.0 - 80.0 %    Lymphocyte % 23.8 10.0 - 50.0 %    Monocyte % 6.8 0.0 - 12.0 %    Eosinophil % 0.8 0.0 - 7.0 %    Basophil % 0.5 0.0 - 2.0 %    Immature Grans % 0.2 0.0 - 0.5 %    Neutrophils, Absolute 4.29 2.00 - 8.60 10*3/mm3    Lymphocytes, Absolute 1.50 0.60 - 4.20 10*3/mm3    Monocytes, Absolute 0.43 0.00 - 0.90 10*3/mm3    Eosinophils, Absolute 0.05 0.00 - 0.70 10*3/mm3    Basophils, Absolute 0.03 0.00 - 0.20 10*3/mm3    Immature Grans, Absolute 0.01 0.00 - 0.02 10*3/mm3     *Note: Due to a large number of results and/or encounters for the requested time period, some results have not been displayed. A complete set of results can be found in Results Review.       Some portions of this note have been dictated using voice recognition software and may contain errors and/or  omissions.

## 2020-01-09 LAB
LAB AP CASE REPORT: NORMAL
PATH REPORT.FINAL DX SPEC: NORMAL
PATH REPORT.GROSS SPEC: NORMAL

## 2020-01-13 ENCOUNTER — TELEPHONE (OUTPATIENT)
Dept: GASTROENTEROLOGY | Facility: CLINIC | Age: 75
End: 2020-01-13

## 2020-01-13 NOTE — TELEPHONE ENCOUNTER
Patient has been contacted and made aware that her biopsies were okay and they will be discussed in detail at her follow up appointment. Patient voiced understanding.

## 2020-01-13 NOTE — TELEPHONE ENCOUNTER
----- Message from Ave T Kyaw sent at 1/13/2020  8:09 AM CST -----      ----- Message -----  From: Henry Caballero PA-C  Sent: 1/12/2020   5:15 PM CST  To: Ave T Kyaw    Either one  ----- Message -----  From: Ave Card  Sent: 1/10/2020   2:42 PM CST  To: Henry Caballero PA-C    Do I need to call her or does Gi, just to tell her that much?      ----- Message -----  From: Henry Caballero PA-C  Sent: 1/10/2020   2:15 PM CST  To: Ave JACQUE Kyaw    Biopsies ok, I will go over in detail at her follow up.  ----- Message -----  From: Ave Card  Sent: 1/10/2020   1:59 PM CST  To: Henry Caballero PA-C    I am sorry, I meant EGD.         ----- Message -----  From: Henry Caballero PA-C  Sent: 1/10/2020   1:05 PM CST  To: Ave Card    She didn't have a colonoscopy  ----- Message -----  From: Ave Card  Sent: 1/10/2020  12:03 PM CST  To: Henry Caballero PA-C    Patient wanted to know her Colonoscopy results.   Please advise if someone can call her with those. Thanks!    ----- Message -----  From: Henry Caballero PA-C  Sent: 1/10/2020  11:38 AM CST  To: Ave Card    Since Lula said nothing to me and I have no messages on the patient and since you have not told me what the patient wants, I don't have any answer.  ----- Message -----  From: Ave Card  Sent: 1/10/2020  11:00 AM CST  To: Henry Caballero PA-C    Patient came into the Office asking for Lula. I explained that Lula is out of the office and she was upset because she stated that Lula told her she was going too call her today. She asked if someone else could give her the results. I went to the back and Sunshine told me that Lula had told her that if the Patient called to let you know and you would decide to either call her yourself or have someone call her in regards to her results. Please advise. Thanks!

## 2020-01-15 ENCOUNTER — OFFICE VISIT (OUTPATIENT)
Dept: GASTROENTEROLOGY | Facility: CLINIC | Age: 75
End: 2020-01-15

## 2020-01-15 ENCOUNTER — OFFICE VISIT (OUTPATIENT)
Dept: FAMILY MEDICINE CLINIC | Facility: CLINIC | Age: 75
End: 2020-01-15

## 2020-01-15 VITALS
WEIGHT: 171 LBS | HEIGHT: 66 IN | SYSTOLIC BLOOD PRESSURE: 138 MMHG | DIASTOLIC BLOOD PRESSURE: 80 MMHG | OXYGEN SATURATION: 99 % | BODY MASS INDEX: 27.48 KG/M2 | HEART RATE: 87 BPM

## 2020-01-15 VITALS
HEIGHT: 66 IN | WEIGHT: 170.6 LBS | BODY MASS INDEX: 27.42 KG/M2 | HEART RATE: 87 BPM | DIASTOLIC BLOOD PRESSURE: 80 MMHG | SYSTOLIC BLOOD PRESSURE: 138 MMHG

## 2020-01-15 DIAGNOSIS — E66.3 OVERWEIGHT (BMI 25.0-29.9): ICD-10-CM

## 2020-01-15 DIAGNOSIS — K31.7 FUNDIC GLAND POLYPOSIS OF STOMACH: ICD-10-CM

## 2020-01-15 DIAGNOSIS — R10.10 PAIN OF UPPER ABDOMEN: ICD-10-CM

## 2020-01-15 DIAGNOSIS — K21.00 GASTROESOPHAGEAL REFLUX DISEASE WITH ESOPHAGITIS: Primary | ICD-10-CM

## 2020-01-15 DIAGNOSIS — K44.9 HIATAL HERNIA: ICD-10-CM

## 2020-01-15 DIAGNOSIS — Z12.31 ENCOUNTER FOR SCREENING MAMMOGRAM FOR BREAST CANCER: ICD-10-CM

## 2020-01-15 PROCEDURE — 99214 OFFICE O/P EST MOD 30 MIN: CPT | Performed by: PHYSICIAN ASSISTANT

## 2020-01-15 PROCEDURE — 99213 OFFICE O/P EST LOW 20 MIN: CPT | Performed by: FAMILY MEDICINE

## 2020-01-15 RX ORDER — DEXLANSOPRAZOLE 60 MG/1
60 CAPSULE, DELAYED RELEASE ORAL DAILY
Qty: 30 CAPSULE | Refills: 3 | Status: SHIPPED | OUTPATIENT
Start: 2020-01-15 | End: 2020-02-14

## 2020-01-15 RX ORDER — OMEPRAZOLE 20 MG/1
40 CAPSULE, DELAYED RELEASE ORAL DAILY
COMMUNITY
End: 2020-01-15

## 2020-01-15 NOTE — PATIENT INSTRUCTIONS

## 2020-01-15 NOTE — ACP (ADVANCE CARE PLANNING)
Patient has an advanced directive. Have asked her to bring a copy for the chart.      This document has been electronically signed by Barbie Murray MD on January 15, 2020 9:39 AM

## 2020-01-15 NOTE — PROGRESS NOTES
Chief Complaint   Patient presents with   • Heartburn   • Hiatal Hernia       ENDO PROCEDURE ORDERED:    Subjective    Akiko Blackwell is a 74 y.o. female. she is here today for follow-up.    History of Present Illness    Patient was seen on recheck of her GERD, hiatal hernia.  Last seen 01/06/2020.  Patient has been taking over-the-counter Prilosec b.i.d. because she was not able to get her Nexium.  She denied nausea, vomiting, dysphagia.  Bowels are moving without blood or mucus.  Weight is down 2 pounds since last visit.  Last colonoscopy showed diverticulosis/hemorrhoids on 06/20/2016.    Patient underwent EGD on 01/07/2020 that showed significant esophagitis, multiple pedunculated and sessile polyps in the gastric body.  Biopsy showed these to be fundic gland polyps.  Distal esophageal biopsies showed chronic esophagitis.  Antral biopsies showed reactive gastropathy.  I did review the images with the patient.  She does have family history of colon cancer.    Patient currently denies nausea or vomiting.  Bowels are moving without blood or mucus.  Weight is down 2 pounds since last visit.    ASSESSMENT/PLAN:  Patient with significant GERD with esophagitis, gastritis, multiple fundic gland polyps.  Patient was concerned about possible malignancy and I did suggest we look at this again within a year.  We will try to get her back on prescription PPI and we will see if she can get Dexilant 60 mg daily.  She will be due for colonoscopy next year.  Patient will followup in 6 months, further pending clinical course and the results of the above.       The following portions of the patient's history were reviewed and updated as appropriate:   Past Medical History:   Diagnosis Date   • Acute bronchitis, unspecified    • Acute laryngitis    • Allergic rhinitis    • Atrophic vaginitis    • Breast cyst    • Diverticular disease of colon    • Encounter for gynecological examination (general) (routine) without abnormal  findings    • Encounter for screening for malignant neoplasm of colon    • Encounter for screening for osteoporosis    • Esophagitis    • Family history of malignant neoplasm of gastrointestinal tract    • GERD (gastroesophageal reflux disease)    • History of bone density study 04/29/2016    DXA BONE DENSITY AXIAL [facility] (2)    • History of screening mammography 01/02/2016   • Multiple gastric polyps 01/07/2020   • Osteopenia    • Pain in right knee    • Subclinical hypothyroidism    • Thyroid nodule    • Viral upper respiratory tract infection    • Vitamin D deficiency      Past Surgical History:   Procedure Laterality Date   • BREAST BIOPSY      BX BREAST PERCUT W/O IMAGE 66730 (1)   x2   • CERVIX SURGERY  05/15/1973    Conization biopsy of cervix. Cervical cautery.   • COLONOSCOPY  06/20/2016    Diverticulosis in the sigmoid colon and in the descending colon.External and internal hemorrhoids.No specimens collected.   • DILATATION AND CURETTAGE  10/10/1990    Fractional   • ENDOSCOPY N/A 1/7/2020    Procedure: ESOPHAGOGASTRODUODENOSCOPY WITH DILATATION--possible;  Surgeon: Alfonzo Wahl MD;  Location: [facility] ENDOSCOPY;  Service: Gastroenterology   • ENDOSCOPY W/ PEG TUBE PLACEMENT  06/20/2012    Esophagitis seen. Biopsy taken. Gastritis in stomach. Biopsy taken. Hiatus hernia in stomach. Normal duodenum. Biopsy taken.   • EXCISION LESION  05/18/1967    Excision of inclusion cyst, vagina.   • INJECTION OF MEDICATION  05/04/2016    Kenalog (4)      • PAP SMEAR  07/22/2010    Mild inflammation present. Negative   • TONSILLECTOMY  01/15/1964    Recurring acute tonsillitis.   • UPPER GASTROINTESTINAL ENDOSCOPY  06/20/2012   • UPPER GASTROINTESTINAL ENDOSCOPY  01/07/2020     Family History   Problem Relation Age of Onset   • Ovarian cancer Mother         Onset age 70-74 years.   • Cancer Brother         Colorectal Cancer, onset age 70-74 years.   • Colon cancer Brother    • Diabetes Other      OB History   "      2    Para   2    Term   2            AB        Living           SAB        TAB        Ectopic        Molar        Multiple        Live Births                  No Known Allergies  Social History     Socioeconomic History   • Marital status:      Spouse name: Not on file   • Number of children: Not on file   • Years of education: Not on file   • Highest education level: Not on file   Tobacco Use   • Smoking status: Former Smoker   • Smokeless tobacco: Never Used   Substance and Sexual Activity   • Alcohol use: No   • Drug use: No   • Sexual activity: Defer     Current Medications:  Prior to Admission medications    Medication Sig Start Date End Date Taking? Authorizing Provider   calcium carbonate (TUMS) 500 MG chewable tablet Chew 2 tablets Daily.   Yes ProviderElmo MD   Cholecalciferol (VITAMIN D3) 1.25 MG (69252 UT) capsule Take 1 capsule by mouth Every 7 (Seven) Days. 19  Yes Maximino Marina MD   levothyroxine (SYNTHROID, LEVOTHROID) 88 MCG tablet Take 1 tab daily Monday to Saturday and 1.5 tabs on 19  Yes Maximino Marina MD   omeprazole (priLOSEC) 20 MG capsule Take 40 mg by mouth Daily.   Yes ProviderElmo MD   esomeprazole (NEXIUM) 40 MG capsule Brand name Nexium, one daily 7/11/19 1/15/20  [facility]     Review of Systems  Review of Systems       Objective    /80 (BP Location: Left arm)   Pulse 87   Ht 167.6 cm (66\")   Wt 77.4 kg (170 lb 9.6 oz)   LMP  (LMP Unknown)   BMI 27.54 kg/m²   Physical Exam   Constitutional: She is oriented to person, place, and time. She appears well-developed and well-nourished. No distress.   HENT:   Head: Normocephalic and atraumatic.   Eyes: Pupils are equal, round, and reactive to light. EOM are normal.   Neck: Normal range of motion.   Cardiovascular: Normal rate, regular rhythm and normal heart sounds.   Pulmonary/Chest: Effort normal and breath sounds normal.   Abdominal: " Soft. Bowel sounds are normal. She exhibits no shifting dullness, no distension, no abdominal bruit, no ascites and no mass. There is no hepatosplenomegaly. There is tenderness. There is no rigidity, no rebound, no guarding and no CVA tenderness. No hernia. Hernia confirmed negative in the ventral area.   mild   Musculoskeletal: Normal range of motion.   Neurological: She is alert and oriented to person, place, and time.   Skin: Skin is warm and dry.   Psychiatric: She has a normal mood and affect. Her behavior is normal. Judgment and thought content normal.   Nursing note and vitals reviewed.    Assessment/Plan      1. Gastroesophageal reflux disease with esophagitis    2. Hiatal hernia    3. Pain of upper abdomen    4. Fundic gland polyposis of stomach    .   Akiko was seen today for heartburn and hiatal hernia.    Diagnoses and all orders for this visit:    Gastroesophageal reflux disease with esophagitis    Hiatal hernia    Pain of upper abdomen    Fundic gland polyposis of stomach    Other orders  -     dexlansoprazole (DEXILANT) 60 MG capsule; Take 1 capsule by mouth Daily for 30 days.        Orders placed during this encounter include:  No orders of the defined types were placed in this encounter.      Medications prescribed:  New Medications Ordered This Visit   Medications   • dexlansoprazole (DEXILANT) 60 MG capsule     Sig: Take 1 capsule by mouth Daily for 30 days.     Dispense:  30 capsule     Refill:  3       Requested Prescriptions     Signed Prescriptions Disp Refills   • dexlansoprazole (DEXILANT) 60 MG capsule 30 capsule 3     Sig: Take 1 capsule by mouth Daily for 30 days.       Review and/or summary of lab tests, radiology, procedures, medications. Review and summary of old records and obtaining of history. The risks and benefits of my recommendations, as well as other treatment options were discussed with the patient today. Questions were answered.    Follow-up: Return in about 6 weeks (around  "2/26/2020), or if symptoms worsen or fail to improve.     * Surgery not found *      This document has been electronically signed by Henry Caballero PA-C on January 19, 2020 12:51 PM      Results for orders placed or performed during the hospital encounter of 01/07/20   Tissue Pathology Exam   Result Value Ref Range    Case Report       Surgical Pathology Report                         Case: [id number]                                  Authorizing Provider:  Alfonzo Wahl MD        Collected:           01/07/2020 04:04 PM          Ordering Location:     [facility]             Received:            01/08/2020 06:56 AM                                 [facility]                                                     Pathologist:           Gurinder Redmond MD                                                           Specimens:   1) - Gastric, Antrum                                                                                2) - Esophagus, Distal                                                                              3) - Stomach, body of stomach polyps                                                       Final Diagnosis       1.  GASTRIC ANTRUM, BIOPSY:  REACTIVE GASTROPATHY.    2.  DISTAL ESOPHAGUS, BIOPSY:  CHRONIC ESOPHAGITIS.    3.  GASTRIC CORPUS, POLYPECTOMY:  FUNDIC GLAND POLYPS.      Gross Description       1.  Specimen #1 is labeled \"A\".  Two tan fragments measure 0.7 x 0.3 x 0.2 cm together.  Totally submitted.    2.  Specimen #2 is labeled \"DE\".  Two gray fragments measure 0.8 x 0.6 x 0.1 cm together.  Totally submitted.    3.  Specimen #3 is labeled \"LUCIA P\".  Two tan fragments measure 0.7 x 0.4 x 0.2 cm together.  Totally submitted.     Results for orders placed or performed in visit on 05/06/19   CBC Auto Differential   Result Value Ref Range    WBC 5.56 3.40 - 10.80 10*3/mm3    RBC 5.12 3.77 - 5.28 10*6/mm3    Hemoglobin 15.4 12.0 - 15.9 g/dL    Hematocrit 45.5 34.0 - 46.6 %    " MCV 88.9 79.0 - 97.0 fL    MCH 30.1 26.6 - 33.0 pg    MCHC 33.8 31.5 - 35.7 g/dL    RDW 12.7 12.3 - 15.4 %    RDW-SD 41.8 37.0 - 54.0 fl    MPV 10.5 6.0 - 12.0 fL    Platelets 295 140 - 450 10*3/mm3    Neutrophil % 59.7 42.7 - 76.0 %    Lymphocyte % 30.0 19.6 - 45.3 %    Monocyte % 7.7 5.0 - 12.0 %    Eosinophil % 1.8 0.3 - 6.2 %    Basophil % 0.4 0.0 - 1.5 %    Immature Grans % 0.4 0.0 - 0.5 %    Neutrophils, Absolute 3.32 1.70 - 7.00 10*3/mm3    Lymphocytes, Absolute 1.67 0.70 - 3.10 10*3/mm3    Monocytes, Absolute 0.43 0.10 - 0.90 10*3/mm3    Eosinophils, Absolute 0.10 0.00 - 0.40 10*3/mm3    Basophils, Absolute 0.02 0.00 - 0.20 10*3/mm3    Immature Grans, Absolute 0.02 0.00 - 0.05 10*3/mm3    nRBC 0.0 0.0 - 0.2 /100 WBC   Vitamin D 25 Hydroxy   Result Value Ref Range    25 Hydroxy, Vitamin D 62.7 30.0 - 100.0 ng/ml      Result Value Ref Range     9.9 0.0 - 38.1 U/mL   TSH   Result Value Ref Range    TSH 3.050 0.270 - 4.200 uIU/mL   Magnesium   Result Value Ref Range    Magnesium 2.2 1.6 - 2.4 mg/dL   Vitamin B12   Result Value Ref Range    Vitamin B-12 337 211 - 946 pg/mL   Comprehensive Metabolic Panel   Result Value Ref Range    Glucose 97 65 - 99 mg/dL    BUN 11 8 - 23 mg/dL    Creatinine 0.85 0.57 - 1.00 mg/dL    Sodium 142 136 - 145 mmol/L    Potassium 4.4 3.5 - 5.2 mmol/L    Chloride 101 98 - 107 mmol/L    CO2 32.8 (H) 22.0 - 29.0 mmol/L    Calcium 9.6 8.6 - 10.5 mg/dL    Total Protein 7.1 6.0 - 8.5 g/dL    Albumin 4.30 3.50 - 5.20 g/dL    ALT (SGPT) 14 1 - 33 U/L    AST (SGOT) 16 1 - 32 U/L    Alkaline Phosphatase 99 39 - 117 U/L    Total Bilirubin 0.5 0.2 - 1.2 mg/dL    eGFR Non African Amer 65 >60 mL/min/1.73    Globulin 2.8 gm/dL    A/G Ratio 1.5 g/dL    BUN/Creatinine Ratio 12.9 7.0 - 25.0    Anion Gap 8.2 5.0 - 15.0 mmol/L   Results for orders placed or performed in visit on 04/29/19   CBC Auto Differential   Result Value Ref Range    WBC 8.15 3.40 - 10.80 10*3/mm3    RBC 5.00 3.77 - 5.28  10*6/mm3    Hemoglobin 15.2 12.0 - 15.9 g/dL    Hematocrit 44.9 34.0 - 46.6 %    MCV 89.8 79.0 - 97.0 fL    MCH 30.4 26.6 - 33.0 pg    MCHC 33.9 31.5 - 35.7 g/dL    RDW 12.3 12.3 - 15.4 %    RDW-SD 40.2 37.0 - 54.0 fl    MPV 10.5 6.0 - 12.0 fL    Platelets 322 140 - 450 10*3/mm3    Neutrophil % 56.9 42.7 - 76.0 %    Lymphocyte % 33.0 19.6 - 45.3 %    Monocyte % 8.6 5.0 - 12.0 %    Eosinophil % 0.9 0.3 - 6.2 %    Basophil % 0.4 0.0 - 1.5 %    Immature Grans % 0.2 0.0 - 0.5 %    Neutrophils, Absolute 4.64 1.70 - 7.00 10*3/mm3    Lymphocytes, Absolute 2.69 0.70 - 3.10 10*3/mm3    Monocytes, Absolute 0.70 0.10 - 0.90 10*3/mm3    Eosinophils, Absolute 0.07 0.00 - 0.40 10*3/mm3    Basophils, Absolute 0.03 0.00 - 0.20 10*3/mm3    Immature Grans, Absolute 0.02 0.00 - 0.05 10*3/mm3    nRBC 0.1 0.0 - 0.2 /100 WBC   Vitamin D 25 Hydroxy   Result Value Ref Range    25 Hydroxy, Vitamin D 23.1 (L) 30.0 - 100.0 ng/ml   TSH   Result Value Ref Range    TSH 1.320 0.270 - 4.200 mIU/mL   Comprehensive Metabolic Panel   Result Value Ref Range    Glucose 95 65 - 99 mg/dL    BUN 13 8 - 23 mg/dL    Creatinine 0.84 0.57 - 1.00 mg/dL    Sodium 143 136 - 145 mmol/L    Potassium 4.3 3.5 - 5.2 mmol/L    Chloride 105 98 - 107 mmol/L    CO2 26.5 22.0 - 29.0 mmol/L    Calcium 10.1 8.6 - 10.5 mg/dL    Total Protein 6.8 6.0 - 8.5 g/dL    Albumin 4.20 3.50 - 5.20 g/dL    ALT (SGPT) 17 1 - 33 U/L    AST (SGOT) 18 1 - 32 U/L    Alkaline Phosphatase 95 39 - 117 U/L    Total Bilirubin 0.5 0.2 - 1.2 mg/dL    eGFR Non African Amer 66 >60 mL/min/1.73    Globulin 2.6 gm/dL    A/G Ratio 1.6 g/dL    BUN/Creatinine Ratio 15.5 7.0 - 25.0    Anion Gap 11.5 mmol/L   Results for orders placed or performed during the hospital encounter of 03/14/19   POCT Influenza A/B   Result Value Ref Range    Rapid Influenza A Ag Negative Negative    Rapid Influenza B Ag Negative Negative    Internal Control Passed Passed    Lot Number [id number]     Expiration Date 10/22/21       *Note: Due to a large number of results and/or encounters for the requested time period, some results have not been displayed. A complete set of results can be found in Results Review.       Some portions of this note have been dictated using voice recognition software and may contain errors and/or omissions.

## 2020-01-15 NOTE — PROGRESS NOTES
Subjective:     Akiko Blackwell is a 74 y.o. female for her annual exam. She has dentures and does not visit the dentist. She has an annual eye exam every July of last being July 2019.  She did receive her second Shingrix shot at [facility].  He is due for mammogram.  GERD  Paitent complains of heartburn. This has been associated with cough, midespigastric pain, need to clear throat frequently, nocturnal burning and unexpected weight loss.  She denies choking on food, difficulty swallowing and shortness of breath. Symptoms have been present for several years. She denies dysphagia. She endorses 3.5 pound . She denies melena, hematochezia, hematemesis, and coffee ground emesis. Medical therapy in the past has included H2 antagonists and proton pump inhibitors.  He does not believe Nexium is in for her any longer.  She has been taking to 20 mg over-the-counter Prilosec and feels improvement.  She had an EGD performed on 1/7/2020 that showed severe esophagitis and gastric polyps.      Past Medical Hx:   Past Medical History:   Diagnosis Date   • Acute bronchitis, unspecified    • Acute laryngitis    • Allergic rhinitis    • Atrophic vaginitis    • Breast cyst    • Diverticular disease of colon    • Encounter for gynecological examination (general) (routine) without abnormal findings    • Encounter for screening for malignant neoplasm of colon    • Encounter for screening for osteoporosis    • Esophagitis    • Family history of malignant neoplasm of gastrointestinal tract    • GERD (gastroesophageal reflux disease)    • History of bone density study 04/29/2016    DXA BONE DENSITY AXIAL [facility] (2)    • History of screening mammography 01/02/2016   • Multiple gastric polyps 01/07/2020   • Osteopenia    • Pain in right knee    • Subclinical hypothyroidism    • Thyroid nodule    • Viral upper respiratory tract infection    • Vitamin D deficiency        Past Surgical Hx:  Past Surgical History:   Procedure  Laterality Date   • BREAST BIOPSY      BX BREAST PERCUT W/O IMAGE 76096 (1)   x2   • CERVIX SURGERY  05/15/1973    Conization biopsy of cervix. Cervical cautery.   • COLONOSCOPY  06/20/2016    Diverticulosis in the sigmoid colon and in the descending colon.External and internal hemorrhoids.No specimens collected.   • DILATATION AND CURETTAGE  10/10/1990    Fractional   • ENDOSCOPY N/A 1/7/2020    Procedure: ESOPHAGOGASTRODUODENOSCOPY WITH DILATATION--possible;  Surgeon: Alfonzo aWhl MD;  Location: [facility] ENDOSCOPY;  Service: Gastroenterology   • ENDOSCOPY W/ PEG TUBE PLACEMENT  06/20/2012    Esophagitis seen. Biopsy taken. Gastritis in stomach. Biopsy taken. Hiatus hernia in stomach. Normal duodenum. Biopsy taken.   • EXCISION LESION  05/18/1967    Excision of inclusion cyst, vagina.   • INJECTION OF MEDICATION  05/04/2016    Kenalog (4)      • PAP SMEAR  07/22/2010    Mild inflammation present. Negative   • TONSILLECTOMY  01/15/1964    Recurring acute tonsillitis.   • UPPER GASTROINTESTINAL ENDOSCOPY  06/20/2012   • UPPER GASTROINTESTINAL ENDOSCOPY  01/07/2020       Health Maintenance:  Health Maintenance   Topic Date Due   • MEDICARE ANNUAL WELLNESS  01/15/2023 (Originally 3/16/2018)   • MAMMOGRAM  02/06/2020   • DXA SCAN  05/30/2020   • COLONOSCOPY  06/20/2026   • TDAP/TD VACCINES (2 - Td) 03/16/2027   • Pneumococcal Vaccine Once at 65 Years Old  Completed   • INFLUENZA VACCINE  Completed   • ZOSTER VACCINE  Completed       Current Meds:    Current Outpatient Medications:   •  calcium carbonate (TUMS) 500 MG chewable tablet, Chew 2 tablets Daily., Disp: , Rfl:   •  Cholecalciferol (VITAMIN D3) 1.25 MG (86288 UT) capsule, Take 1 capsule by mouth Every 7 (Seven) Days., Disp: 12 capsule, Rfl: 11  •  levothyroxine (SYNTHROID, LEVOTHROID) 88 MCG tablet, Take 1 tab daily Monday to Saturday and 1.5 tabs on Sunday, Disp: 96 tablet, Rfl: 3  •  dexlansoprazole (DEXILANT) 60 MG capsule, Take 1 capsule by mouth Daily for  "30 days., Disp: 30 capsule, Rfl: 3    Allergies:  Patient has no known allergies.    Family Hx:  Family History   Problem Relation Age of Onset   • Ovarian cancer Mother         Onset age 70-74 years.   • Cancer Brother         Colorectal Cancer, onset age 70-74 years.   • Colon cancer Brother    • Diabetes Other         Social History:  Social History     Socioeconomic History   • Marital status:      Spouse name: Not on file   • Number of children: Not on file   • Years of education: Not on file   • Highest education level: Not on file   Tobacco Use   • Smoking status: Former Smoker   • Smokeless tobacco: Never Used   Substance and Sexual Activity   • Alcohol use: No   • Drug use: No   • Sexual activity: Defer       Review of Systems  Review of Systems   Constitutional: Negative for activity change, appetite change, fatigue and fever.   HENT: Negative for ear pain and sore throat.    Eyes: Negative for pain and visual disturbance.   Respiratory: Positive for cough. Negative for shortness of breath.    Cardiovascular: Negative for chest pain and palpitations.   Gastrointestinal: Negative for abdominal pain, nausea and vomiting.   Endocrine: Negative for cold intolerance and heat intolerance.   Genitourinary: Negative for difficulty urinating and dysuria.   Musculoskeletal: Positive for arthralgias. Negative for gait problem.   Skin: Negative for color change and rash.   Neurological: Negative for dizziness, weakness and headaches.   Hematological: Negative for adenopathy. Does not bruise/bleed easily.   Psychiatric/Behavioral: Negative for agitation, confusion and sleep disturbance.       Objective:     /80 (BP Location: Right arm, Patient Position: Sitting, Cuff Size: Adult)   Pulse 87   Ht 167.6 cm (66\")   Wt 77.6 kg (171 lb)   LMP  (LMP Unknown)   SpO2 99%   BMI 27.60 kg/m²   Physical Exam   Constitutional: She is oriented to person, place, and time. She appears well-developed and " well-nourished.   HENT:   Head: Normocephalic and atraumatic.   Right Ear: Hearing, tympanic membrane, external ear and ear canal normal.   Left Ear: Hearing, tympanic membrane, external ear and ear canal normal.   Nose: Nose normal.   Mouth/Throat: Oropharynx is clear and moist.   Eyes: Pupils are equal, round, and reactive to light. Conjunctivae and EOM are normal.   Neck: Normal range of motion. Neck supple.   Cardiovascular: Normal rate, regular rhythm and normal heart sounds.   Pulmonary/Chest: Effort normal and breath sounds normal.   Abdominal: Soft. Bowel sounds are normal. She exhibits no distension. There is no tenderness.   Musculoskeletal: Normal range of motion.   Neurological: She is alert and oriented to person, place, and time.   Skin: Skin is warm and dry.   Psychiatric: She has a normal mood and affect. Her speech is normal and behavior is normal. Cognition and memory are normal.             Assessment:       1. Gastroesophageal reflux disease with esophagitis    2. Encounter for screening mammogram for breast cancer    3. Overweight (BMI 25.0-29.9)         Plan:     1.  Rx changes: Has appointment with GI and will discuss new PPI with GI.  2.  Follow up: 1 year and as needed   3. Continue follow up with Endocrinology. Labwork ordered  4. Mammogram ordered.      Goals        Weight    • Weight (lb) < 66 kg (145 lb 8.1 oz)      Barriers to goals: None            Preventative:  Female Preventative: Colon cancer screening is up to date  Recommended:Up-to-date   Patient is a non smoker.   does not drink  eat more fruits and vegetables, decrease soda or juice intake, increase water intake, increase physical activity, plan meals, eat breakfast and have 3 meals a day   Patient's Body mass index is 27.6 kg/m². BMI is above normal parameters. Recommendations include: exercise counseling and nutrition counseling.  RISK SCORE: 3       This document has been electronically signed by Barbie Murray MD on  January 16, 2020 6:49 PM

## 2020-01-16 PROBLEM — M25.511 ACUTE PAIN OF RIGHT SHOULDER: Status: RESOLVED | Noted: 2018-04-12 | Resolved: 2020-01-16

## 2020-01-16 PROBLEM — K20.90 ESOPHAGITIS DETERMINED BY ENDOSCOPY: Chronic | Status: ACTIVE | Noted: 2020-01-16

## 2020-01-16 PROBLEM — K31.7 MULTIPLE GASTRIC POLYPS: Chronic | Status: ACTIVE | Noted: 2020-01-16

## 2020-02-26 ENCOUNTER — OFFICE VISIT (OUTPATIENT)
Dept: GASTROENTEROLOGY | Facility: CLINIC | Age: 75
End: 2020-02-26

## 2020-02-26 VITALS
HEART RATE: 85 BPM | SYSTOLIC BLOOD PRESSURE: 136 MMHG | BODY MASS INDEX: 27.48 KG/M2 | DIASTOLIC BLOOD PRESSURE: 84 MMHG | HEIGHT: 66 IN | WEIGHT: 171 LBS

## 2020-02-26 DIAGNOSIS — K44.9 HIATAL HERNIA: ICD-10-CM

## 2020-02-26 DIAGNOSIS — R10.10 PAIN OF UPPER ABDOMEN: ICD-10-CM

## 2020-02-26 DIAGNOSIS — K31.7 FUNDIC GLAND POLYPOSIS OF STOMACH: ICD-10-CM

## 2020-02-26 DIAGNOSIS — K21.00 GASTROESOPHAGEAL REFLUX DISEASE WITH ESOPHAGITIS: Primary | ICD-10-CM

## 2020-02-26 PROCEDURE — 99213 OFFICE O/P EST LOW 20 MIN: CPT | Performed by: PHYSICIAN ASSISTANT

## 2020-02-26 RX ORDER — DEXLANSOPRAZOLE 60 MG/1
60 CAPSULE, DELAYED RELEASE ORAL DAILY
Qty: 30 CAPSULE | Refills: 3 | Status: SHIPPED | OUTPATIENT
Start: 2020-02-26 | End: 2020-06-08

## 2020-02-26 RX ORDER — DEXLANSOPRAZOLE 60 MG/1
60 CAPSULE, DELAYED RELEASE ORAL DAILY
COMMUNITY
Start: 2020-02-16 | End: 2020-02-26 | Stop reason: SDUPTHER

## 2020-03-01 PROBLEM — B97.89 VIRAL RESPIRATORY ILLNESS: Status: ACTIVE | Noted: 2020-03-01

## 2020-03-01 PROBLEM — J98.8 VIRAL RESPIRATORY ILLNESS: Status: ACTIVE | Noted: 2020-03-01

## 2020-04-29 ENCOUNTER — TELEPHONE (OUTPATIENT)
Dept: ENDOCRINOLOGY | Facility: CLINIC | Age: 75
End: 2020-04-29

## 2020-04-29 RX ORDER — CHOLECALCIFEROL (VITAMIN D3) 1250 MCG
50000 CAPSULE ORAL
Qty: 12 CAPSULE | Refills: 11 | Status: SHIPPED | OUTPATIENT
Start: 2020-04-29 | End: 2021-01-25 | Stop reason: SDUPTHER

## 2020-05-13 ENCOUNTER — TELEPHONE (OUTPATIENT)
Dept: FAMILY MEDICINE CLINIC | Facility: CLINIC | Age: 75
End: 2020-05-13

## 2020-06-08 RX ORDER — DEXLANSOPRAZOLE 60 MG/1
CAPSULE, DELAYED RELEASE ORAL
Qty: 90 CAPSULE | Refills: 0 | Status: SHIPPED | OUTPATIENT
Start: 2020-06-08 | End: 2020-06-22

## 2020-06-22 RX ORDER — DEXLANSOPRAZOLE 60 MG/1
CAPSULE, DELAYED RELEASE ORAL
Qty: 30 CAPSULE | Refills: 0 | Status: SHIPPED | OUTPATIENT
Start: 2020-06-22 | End: 2020-08-31 | Stop reason: SDUPTHER

## 2020-06-29 ENCOUNTER — OFFICE VISIT (OUTPATIENT)
Dept: GASTROENTEROLOGY | Facility: CLINIC | Age: 75
End: 2020-06-29

## 2020-06-29 VITALS
HEIGHT: 66 IN | DIASTOLIC BLOOD PRESSURE: 86 MMHG | WEIGHT: 171.8 LBS | BODY MASS INDEX: 27.61 KG/M2 | SYSTOLIC BLOOD PRESSURE: 142 MMHG

## 2020-06-29 DIAGNOSIS — K31.7 FUNDIC GLAND POLYPOSIS OF STOMACH: ICD-10-CM

## 2020-06-29 DIAGNOSIS — K44.9 HIATAL HERNIA: ICD-10-CM

## 2020-06-29 DIAGNOSIS — K21.00 GASTROESOPHAGEAL REFLUX DISEASE WITH ESOPHAGITIS: Primary | ICD-10-CM

## 2020-06-29 DIAGNOSIS — R10.10 PAIN OF UPPER ABDOMEN: ICD-10-CM

## 2020-06-29 PROCEDURE — 99213 OFFICE O/P EST LOW 20 MIN: CPT | Performed by: PHYSICIAN ASSISTANT

## 2020-06-29 NOTE — PROGRESS NOTES
Chief Complaint   Patient presents with   • Heartburn   • Hiatal Hernia   • Abdominal Pain       ENDO PROCEDURE ORDERED:    Subjective    Akiko Blackwell is a 74 y.o. female. she is here today for follow-up.    History of Present Illness    The patient was seen on a recheck of her GERD, hiatal hernia, abdominal pain.  Last seen 02/26/2020.  She had a chest x-ray 03/01/2020.  Patient states she does fairly well on the Dexilant as long as she is careful with her diet.  She denied nausea or vomiting.  She still has occasional dysphagia.  Bowels are without blood or mucus.  Weight is up less than 1 pound since last visit.  Last EGD showed significant esophagitis, gastritis.  She had a lot of fundic gland polyps and was concerned about that on 01/07/2020.  Last colonoscopy 06/20/2016 showed diverticulosis and hemorrhoids with positive family history.    ASSESSMENT/PLAN:  Patient with chronic GERD, hiatal hernia, appears to do well on the Dexilant.  She is concerned about the cost of the Dexilant; it should be going generic this summer.  She is going to check and see if she can get it less expensive at another pharmacy.  We were able to check at [facility] and it was several hundred dollars less for a 3 month supply overall.  She has over 300 dollar copay for a 3 month supply.  I did talk to her about repeating an endoscopy next year.  As long as she is doing well, we will plan follow up in 6 months, sooner if needed.      The following portions of the patient's history were reviewed and updated as appropriate:   Past Medical History:   Diagnosis Date   • Acute bronchitis, unspecified    • Acute laryngitis    • Allergic rhinitis    • Atrophic vaginitis    • Breast cyst    • Diverticular disease of colon    • Encounter for gynecological examination (general) (routine) without abnormal findings    • Encounter for screening for malignant neoplasm of colon    • Encounter for screening for osteoporosis    • Esophagitis    •  Family history of malignant neoplasm of gastrointestinal tract    • GERD (gastroesophageal reflux disease)    • History of bone density study 2016    DXA BONE DENSITY AXIAL [facility] [facility] (2)    • History of screening mammography 2016   • Multiple gastric polyps 2020   • Osteopenia    • Pain in right knee    • Subclinical hypothyroidism    • Thyroid nodule    • Viral upper respiratory tract infection    • Vitamin D deficiency      Past Surgical History:   Procedure Laterality Date   • BREAST BIOPSY      BX BREAST PERCUT W/O IMAGE 26782 (1)   x2   • CERVIX SURGERY  05/15/1973    Conization biopsy of cervix. Cervical cautery.   • COLONOSCOPY  2016    Diverticulosis in the sigmoid colon and in the descending colon.External and internal hemorrhoids.No specimens collected.   • DILATATION AND CURETTAGE  10/10/1990    Fractional   • ENDOSCOPY N/A 2020    Procedure: ESOPHAGOGASTRODUODENOSCOPY WITH DILATATION--possible;  Surgeon: Alfonzo Wahl MD;  Location: [facility] ENDOSCOPY;  Service: Gastroenterology   • ENDOSCOPY W/ PEG TUBE PLACEMENT  2012    Esophagitis seen. Biopsy taken. Gastritis in stomach. Biopsy taken. Hiatus hernia in stomach. Normal duodenum. Biopsy taken.   • EXCISION LESION  1967    Excision of inclusion cyst, vagina.   • INJECTION OF MEDICATION  2016    Kenalog (4)      • PAP SMEAR  2010    Mild inflammation present. Negative   • TONSILLECTOMY  01/15/1964    Recurring acute tonsillitis.   • UPPER GASTROINTESTINAL ENDOSCOPY  2012   • UPPER GASTROINTESTINAL ENDOSCOPY  2020     Family History   Problem Relation Age of Onset   • Ovarian cancer Mother         Onset age 70-74 years.   • Cancer Brother         Colorectal Cancer, onset age 70-74 years.   • Colon cancer Brother    • Diabetes Other      OB History        2    Para   2    Term   2            AB        Living           SAB        TAB        Ectopic        Molar         "Multiple        Live Births                  No Known Allergies  Social History     Socioeconomic History   • Marital status:      Spouse name: Not on file   • Number of children: Not on file   • Years of education: Not on file   • Highest education level: Not on file   Tobacco Use   • Smoking status: Former Smoker   • Smokeless tobacco: Never Used   Substance and Sexual Activity   • Alcohol use: No   • Drug use: No   • Sexual activity: Defer     Current Medications:  Prior to Admission medications    Medication Sig Start Date End Date Taking? Authorizing Provider   calcium carbonate (TUMS ULTRA) 1000 MG chewable tablet Chew 1 tablet Daily.   Yes Provider, MD Elmo   Cholecalciferol (VITAMIN D3) 1.25 MG (43413 UT) capsule Take 1 capsule by mouth Every 7 (Seven) Days. 4/29/20  Yes Maximino Marina MD   DEXILANT 60 MG capsule TAKE 1 CAPSULE BY MOUTH DAILY 6/22/20  Yes Henry Caballero PA-C   levothyroxine (SYNTHROID, LEVOTHROID) 88 MCG tablet Take 1 tab daily Monday to Saturday and 1.5 tabs on Sunday 11/6/19  Yes Maximino Marina MD     Review of Systems  Review of Systems       Objective    /86 (BP Location: Left arm)   Ht 167.6 cm (66\")   Wt 77.9 kg (171 lb 12.8 oz)   LMP  (LMP Unknown)   BMI 27.73 kg/m²   Physical Exam   Constitutional: She is oriented to person, place, and time. She appears well-developed and well-nourished. No distress.   HENT:   Head: Normocephalic and atraumatic.   Eyes: Pupils are equal, round, and reactive to light. EOM are normal.   Neck: Normal range of motion.   Cardiovascular: Normal rate, regular rhythm and normal heart sounds.   Pulmonary/Chest: Effort normal and breath sounds normal.   Abdominal: Soft. Bowel sounds are normal. She exhibits no shifting dullness, no distension, no abdominal bruit, no ascites and no mass. There is no hepatosplenomegaly. There is tenderness. There is no rigidity, no rebound, no guarding and no CVA tenderness. No " hernia. Hernia confirmed negative in the ventral area.   mild   Musculoskeletal: Normal range of motion.   Neurological: She is alert and oriented to person, place, and time.   Skin: Skin is warm and dry.   Psychiatric: She has a normal mood and affect. Her behavior is normal. Judgment and thought content normal.   Nursing note and vitals reviewed.    Assessment/Plan      1. Gastroesophageal reflux disease with esophagitis    2. Hiatal hernia    3. Fundic gland polyposis of stomach    4. Pain of upper abdomen    .   Akiko was seen today for heartburn, hiatal hernia and abdominal pain.    Diagnoses and all orders for this visit:    Gastroesophageal reflux disease with esophagitis    Hiatal hernia    Fundic gland polyposis of stomach    Pain of upper abdomen        Orders placed during this encounter include:  No orders of the defined types were placed in this encounter.      Medications prescribed:  No orders of the defined types were placed in this encounter.      Requested Prescriptions      No prescriptions requested or ordered in this encounter       Review and/or summary of lab tests, radiology, procedures, medications. Review and summary of old records and obtaining of history. The risks and benefits of my recommendations, as well as other treatment options were discussed with the patient today. Questions were answered.    Follow-up: Return in about 6 months (around 12/29/2020), or if symptoms worsen or fail to improve.     * Surgery not found *      This document has been electronically signed by Henry Caballero PA-C on June 30, 2020 17:53      Results for orders placed or performed during the hospital encounter of 01/07/20   Tissue Pathology Exam   Result Value Ref Range    Case Report       Surgical Pathology Report                         Case: [id number]                                  Authorizing Provider:  Alfonzo Wahl MD        Collected:           01/07/2020 04:04 PM          Ordering Location:   "   [facility]             Received:            01/08/2020 06:56 AM                                 [place] [facility]                                                     Pathologist:           Gurinder Redmond MD                                                           Specimens:   1) - Gastric, Antrum                                                                                2) - Esophagus, Distal                                                                              3) - Stomach, body of stomach polyps                                                       Final Diagnosis       1.  GASTRIC ANTRUM, BIOPSY:  REACTIVE GASTROPATHY.    2.  DISTAL ESOPHAGUS, BIOPSY:  CHRONIC ESOPHAGITIS.    3.  GASTRIC CORPUS, POLYPECTOMY:  FUNDIC GLAND POLYPS.      Gross Description       1.  Specimen #1 is labeled \"A\".  Two tan fragments measure 0.7 x 0.3 x 0.2 cm together.  Totally submitted.    2.  Specimen #2 is labeled \"DE\".  Two gray fragments measure 0.8 x 0.6 x 0.1 cm together.  Totally submitted.    3.  Specimen #3 is labeled \"LUCIA P\".  Two tan fragments measure 0.7 x 0.4 x 0.2 cm together.  Totally submitted.     Results for orders placed or performed in visit on 05/06/19   CBC Auto Differential   Result Value Ref Range    WBC 5.56 3.40 - 10.80 10*3/mm3    RBC 5.12 3.77 - 5.28 10*6/mm3    Hemoglobin 15.4 12.0 - 15.9 g/dL    Hematocrit 45.5 34.0 - 46.6 %    MCV 88.9 79.0 - 97.0 fL    MCH 30.1 26.6 - 33.0 pg    MCHC 33.8 31.5 - 35.7 g/dL    RDW 12.7 12.3 - 15.4 %    RDW-SD 41.8 37.0 - 54.0 fl    MPV 10.5 6.0 - 12.0 fL    Platelets 295 140 - 450 10*3/mm3    Neutrophil % 59.7 42.7 - 76.0 %    Lymphocyte % 30.0 19.6 - 45.3 %    Monocyte % 7.7 5.0 - 12.0 %    Eosinophil % 1.8 0.3 - 6.2 %    Basophil % 0.4 0.0 - 1.5 %    Immature Grans % 0.4 0.0 - 0.5 %    Neutrophils, Absolute 3.32 1.70 - 7.00 10*3/mm3    Lymphocytes, Absolute 1.67 0.70 - 3.10 10*3/mm3    Monocytes, Absolute 0.43 0.10 - 0.90 10*3/mm3    " Eosinophils, Absolute 0.10 0.00 - 0.40 10*3/mm3    Basophils, Absolute 0.02 0.00 - 0.20 10*3/mm3    Immature Grans, Absolute 0.02 0.00 - 0.05 10*3/mm3    nRBC 0.0 0.0 - 0.2 /100 WBC   Vitamin D 25 Hydroxy   Result Value Ref Range    25 Hydroxy, Vitamin D 62.7 30.0 - 100.0 ng/ml      Result Value Ref Range     9.9 0.0 - 38.1 U/mL   TSH   Result Value Ref Range    TSH 3.050 0.270 - 4.200 uIU/mL   Magnesium   Result Value Ref Range    Magnesium 2.2 1.6 - 2.4 mg/dL   Vitamin B12   Result Value Ref Range    Vitamin B-12 337 211 - 946 pg/mL   Comprehensive Metabolic Panel   Result Value Ref Range    Glucose 97 65 - 99 mg/dL    BUN 11 8 - 23 mg/dL    Creatinine 0.85 0.57 - 1.00 mg/dL    Sodium 142 136 - 145 mmol/L    Potassium 4.4 3.5 - 5.2 mmol/L    Chloride 101 98 - 107 mmol/L    CO2 32.8 (H) 22.0 - 29.0 mmol/L    Calcium 9.6 8.6 - 10.5 mg/dL    Total Protein 7.1 6.0 - 8.5 g/dL    Albumin 4.30 3.50 - 5.20 g/dL    ALT (SGPT) 14 1 - 33 U/L    AST (SGOT) 16 1 - 32 U/L    Alkaline Phosphatase 99 39 - 117 U/L    Total Bilirubin 0.5 0.2 - 1.2 mg/dL    eGFR Non African Amer 65 >60 mL/min/1.73    Globulin 2.8 gm/dL    A/G Ratio 1.5 g/dL    BUN/Creatinine Ratio 12.9 7.0 - 25.0    Anion Gap 8.2 5.0 - 15.0 mmol/L   Results for orders placed or performed in visit on 04/29/19   CBC Auto Differential   Result Value Ref Range    WBC 8.15 3.40 - 10.80 10*3/mm3    RBC 5.00 3.77 - 5.28 10*6/mm3    Hemoglobin 15.2 12.0 - 15.9 g/dL    Hematocrit 44.9 34.0 - 46.6 %    MCV 89.8 79.0 - 97.0 fL    MCH 30.4 26.6 - 33.0 pg    MCHC 33.9 31.5 - 35.7 g/dL    RDW 12.3 12.3 - 15.4 %    RDW-SD 40.2 37.0 - 54.0 fl    MPV 10.5 6.0 - 12.0 fL    Platelets 322 140 - 450 10*3/mm3    Neutrophil % 56.9 42.7 - 76.0 %    Lymphocyte % 33.0 19.6 - 45.3 %    Monocyte % 8.6 5.0 - 12.0 %    Eosinophil % 0.9 0.3 - 6.2 %    Basophil % 0.4 0.0 - 1.5 %    Immature Grans % 0.2 0.0 - 0.5 %    Neutrophils, Absolute 4.64 1.70 - 7.00 10*3/mm3    Lymphocytes,  Absolute 2.69 0.70 - 3.10 10*3/mm3    Monocytes, Absolute 0.70 0.10 - 0.90 10*3/mm3    Eosinophils, Absolute 0.07 0.00 - 0.40 10*3/mm3    Basophils, Absolute 0.03 0.00 - 0.20 10*3/mm3    Immature Grans, Absolute 0.02 0.00 - 0.05 10*3/mm3    nRBC 0.1 0.0 - 0.2 /100 WBC   Vitamin D 25 Hydroxy   Result Value Ref Range    25 Hydroxy, Vitamin D 23.1 (L) 30.0 - 100.0 ng/ml   TSH   Result Value Ref Range    TSH 1.320 0.270 - 4.200 mIU/mL   Comprehensive Metabolic Panel   Result Value Ref Range    Glucose 95 65 - 99 mg/dL    BUN 13 8 - 23 mg/dL    Creatinine 0.84 0.57 - 1.00 mg/dL    Sodium 143 136 - 145 mmol/L    Potassium 4.3 3.5 - 5.2 mmol/L    Chloride 105 98 - 107 mmol/L    CO2 26.5 22.0 - 29.0 mmol/L    Calcium 10.1 8.6 - 10.5 mg/dL    Total Protein 6.8 6.0 - 8.5 g/dL    Albumin 4.20 3.50 - 5.20 g/dL    ALT (SGPT) 17 1 - 33 U/L    AST (SGOT) 18 1 - 32 U/L    Alkaline Phosphatase 95 39 - 117 U/L    Total Bilirubin 0.5 0.2 - 1.2 mg/dL    eGFR Non African Amer 66 >60 mL/min/1.73    Globulin 2.6 gm/dL    A/G Ratio 1.6 g/dL    BUN/Creatinine Ratio 15.5 7.0 - 25.0    Anion Gap 11.5 mmol/L   Results for orders placed or performed during the hospital encounter of 03/14/19   POCT Influenza A/B   Result Value Ref Range    Rapid Influenza A Ag Negative Negative    Rapid Influenza B Ag Negative Negative    Internal Control Passed Passed    Lot Number [id number]     Expiration Date 10/22/21      *Note: Due to a large number of results and/or encounters for the requested time period, some results have not been displayed. A complete set of results can be found in Results Review.       Some portions of this note have been dictated using voice recognition software and may contain errors and/or omissions.

## 2020-06-29 NOTE — PATIENT INSTRUCTIONS

## 2020-07-21 ENCOUNTER — LAB (OUTPATIENT)
Dept: LAB | Facility: HOSPITAL | Age: 75
End: 2020-07-21

## 2020-07-21 LAB
25(OH)D3 SERPL-MCNC: 87.8 NG/ML (ref 30–100)
ALBUMIN SERPL-MCNC: 4.4 G/DL (ref 3.5–5.2)
ALBUMIN/GLOB SERPL: 2 G/DL
ALP SERPL-CCNC: 97 U/L (ref 39–117)
ALT SERPL W P-5'-P-CCNC: 15 U/L (ref 1–33)
ANION GAP SERPL CALCULATED.3IONS-SCNC: 10.6 MMOL/L (ref 5–15)
AST SERPL-CCNC: 19 U/L (ref 1–32)
BASOPHILS # BLD AUTO: 0.03 10*3/MM3 (ref 0–0.2)
BASOPHILS NFR BLD AUTO: 0.6 % (ref 0–1.5)
BILIRUB SERPL-MCNC: 0.5 MG/DL (ref 0–1.2)
BUN SERPL-MCNC: 14 MG/DL (ref 8–23)
BUN/CREAT SERPL: 17.7 (ref 7–25)
CALCIUM SPEC-SCNC: 10.1 MG/DL (ref 8.6–10.5)
CHLORIDE SERPL-SCNC: 104 MMOL/L (ref 98–107)
CO2 SERPL-SCNC: 27.4 MMOL/L (ref 22–29)
CREAT SERPL-MCNC: 0.79 MG/DL (ref 0.57–1)
DEPRECATED RDW RBC AUTO: 38.9 FL (ref 37–54)
EOSINOPHIL # BLD AUTO: 0.08 10*3/MM3 (ref 0–0.4)
EOSINOPHIL NFR BLD AUTO: 1.5 % (ref 0.3–6.2)
ERYTHROCYTE [DISTWIDTH] IN BLOOD BY AUTOMATED COUNT: 12.2 % (ref 12.3–15.4)
GFR SERPL CREATININE-BSD FRML MDRD: 71 ML/MIN/1.73
GLOBULIN UR ELPH-MCNC: 2.2 GM/DL
GLUCOSE SERPL-MCNC: 92 MG/DL (ref 65–99)
HCT VFR BLD AUTO: 44.6 % (ref 34–46.6)
HGB BLD-MCNC: 15.7 G/DL (ref 12–15.9)
IMM GRANULOCYTES # BLD AUTO: 0.02 10*3/MM3 (ref 0–0.05)
IMM GRANULOCYTES NFR BLD AUTO: 0.4 % (ref 0–0.5)
LYMPHOCYTES # BLD AUTO: 1.8 10*3/MM3 (ref 0.7–3.1)
LYMPHOCYTES NFR BLD AUTO: 33.7 % (ref 19.6–45.3)
MCH RBC QN AUTO: 30.8 PG (ref 26.6–33)
MCHC RBC AUTO-ENTMCNC: 35.2 G/DL (ref 31.5–35.7)
MCV RBC AUTO: 87.5 FL (ref 79–97)
MONOCYTES # BLD AUTO: 0.45 10*3/MM3 (ref 0.1–0.9)
MONOCYTES NFR BLD AUTO: 8.4 % (ref 5–12)
NEUTROPHILS NFR BLD AUTO: 2.96 10*3/MM3 (ref 1.7–7)
NEUTROPHILS NFR BLD AUTO: 55.4 % (ref 42.7–76)
NRBC BLD AUTO-RTO: 0 /100 WBC (ref 0–0.2)
PLATELET # BLD AUTO: 300 10*3/MM3 (ref 140–450)
PMV BLD AUTO: 10.5 FL (ref 6–12)
POTASSIUM SERPL-SCNC: 4.4 MMOL/L (ref 3.5–5.2)
PROT SERPL-MCNC: 6.6 G/DL (ref 6–8.5)
RBC # BLD AUTO: 5.1 10*6/MM3 (ref 3.77–5.28)
SODIUM SERPL-SCNC: 142 MMOL/L (ref 136–145)
TSH SERPL DL<=0.05 MIU/L-ACNC: 1.07 UIU/ML (ref 0.27–4.2)
VIT B12 BLD-MCNC: 302 PG/ML (ref 211–946)
WBC # BLD AUTO: 5.34 10*3/MM3 (ref 3.4–10.8)

## 2020-07-21 PROCEDURE — 84443 ASSAY THYROID STIM HORMONE: CPT | Performed by: INTERNAL MEDICINE

## 2020-07-21 PROCEDURE — 36415 COLL VENOUS BLD VENIPUNCTURE: CPT | Performed by: INTERNAL MEDICINE

## 2020-07-21 PROCEDURE — 82607 VITAMIN B-12: CPT | Performed by: INTERNAL MEDICINE

## 2020-07-21 PROCEDURE — 80053 COMPREHEN METABOLIC PANEL: CPT | Performed by: INTERNAL MEDICINE

## 2020-07-21 PROCEDURE — 82306 VITAMIN D 25 HYDROXY: CPT | Performed by: INTERNAL MEDICINE

## 2020-07-21 PROCEDURE — 85025 COMPLETE CBC W/AUTO DIFF WBC: CPT | Performed by: INTERNAL MEDICINE

## 2020-07-28 ENCOUNTER — OFFICE VISIT (OUTPATIENT)
Dept: ENDOCRINOLOGY | Facility: CLINIC | Age: 75
End: 2020-07-28

## 2020-07-28 VITALS
OXYGEN SATURATION: 98 % | SYSTOLIC BLOOD PRESSURE: 132 MMHG | DIASTOLIC BLOOD PRESSURE: 74 MMHG | HEART RATE: 71 BPM | WEIGHT: 170.9 LBS | BODY MASS INDEX: 27.47 KG/M2 | HEIGHT: 66 IN

## 2020-07-28 DIAGNOSIS — E06.3 HYPOTHYROIDISM DUE TO HASHIMOTO'S THYROIDITIS: Primary | ICD-10-CM

## 2020-07-28 DIAGNOSIS — R09.89 RIGHT CAROTID BRUIT: ICD-10-CM

## 2020-07-28 DIAGNOSIS — E03.8 HYPOTHYROIDISM DUE TO HASHIMOTO'S THYROIDITIS: Primary | ICD-10-CM

## 2020-07-28 DIAGNOSIS — E53.8 B12 DEFICIENCY: ICD-10-CM

## 2020-07-28 DIAGNOSIS — E55.9 VITAMIN D DEFICIENCY: ICD-10-CM

## 2020-07-28 DIAGNOSIS — M81.0 OSTEOPOROSIS, UNSPECIFIED OSTEOPOROSIS TYPE, UNSPECIFIED PATHOLOGICAL FRACTURE PRESENCE: ICD-10-CM

## 2020-07-28 PROCEDURE — 99214 OFFICE O/P EST MOD 30 MIN: CPT | Performed by: INTERNAL MEDICINE

## 2020-07-28 RX ORDER — LEVOTHYROXINE SODIUM 88 UG/1
TABLET ORAL
Qty: 96 TABLET | Refills: 3 | Status: SHIPPED | OUTPATIENT
Start: 2020-07-28 | End: 2020-12-31 | Stop reason: SDUPTHER

## 2020-07-28 NOTE — PROGRESS NOTES
Akiko Blackwell is a 74 y.o. female who presents for  evaluation of   Chief Complaint   Patient presents with   • Follow-up     6 month         Primary Care / Referring Provider  Barbie Murray MD    followup     reason - hypothyroidism    duration - March 2013    alleviating factors - levothyroxine    symptoms - hair falling and brittle nails - improved    --    thyroid nodule  personal interpretation , pseudonodule    Feb 2015 compared to Nov 2016   my interpretation - pseudonodules   radiologist interpreation - slight increase in size from 9 mm to 1 cm of largest nodule             ---    Ostepenia    DXA 2016     nl vit D on 600 +     h o  jaw fracture,     Her only present problem is GERD       Past Medical History:   Diagnosis Date   • Acute bronchitis, unspecified    • Acute laryngitis    • Allergic rhinitis    • Atrophic vaginitis    • Breast cyst    • Diverticular disease of colon    • Encounter for gynecological examination (general) (routine) without abnormal findings    • Encounter for screening for malignant neoplasm of colon    • Encounter for screening for osteoporosis    • Esophagitis    • Family history of malignant neoplasm of gastrointestinal tract    • GERD (gastroesophageal reflux disease)    • History of bone density study 04/29/2016    DXA BONE DENSITY AXIAL [address] [facility] (2)    • History of screening mammography 01/02/2016   • Multiple gastric polyps 01/07/2020   • Osteopenia    • Pain in right knee    • Subclinical hypothyroidism    • Thyroid nodule    • Viral upper respiratory tract infection    • Vitamin D deficiency      Family History   Problem Relation Age of Onset   • Ovarian cancer Mother         Onset age 70-74 years.   • Cancer Brother         Colorectal Cancer, onset age 70-74 years.   • Colon cancer Brother    • Diabetes Other      Social History     Tobacco Use   • Smoking status: Former Smoker   • Smokeless tobacco: Never Used   Substance Use Topics   • Alcohol  use: No   • Drug use: No         Current Outpatient Medications:   •  calcium carbonate (TUMS ULTRA) 1000 MG chewable tablet, Chew 1 tablet Daily., Disp: , Rfl:   •  Cholecalciferol (VITAMIN D3) 1.25 MG (43249 UT) capsule, Take 1 capsule by mouth Every 7 (Seven) Days., Disp: 12 capsule, Rfl: 11  •  DEXILANT 60 MG capsule, TAKE 1 CAPSULE BY MOUTH DAILY, Disp: 30 capsule, Rfl: 0  •  levothyroxine (SYNTHROID, LEVOTHROID) 88 MCG tablet, Take 1 tab daily Monday to Saturday and 1.5 tabs on Sunday, Disp: 96 tablet, Rfl: 3    Review of Systems    Review of Systems   Constitutional: Negative for activity change, appetite change, chills, diaphoresis, fatigue, fever and unexpected weight change.   HENT: Negative for congestion, drooling, ear discharge, ear pain, facial swelling, mouth sores, nosebleeds, postnasal drip, sinus pressure, sneezing, sore throat, tinnitus, trouble swallowing and voice change.    Eyes: Negative.  Negative for photophobia, pain, discharge, redness and itching.   Respiratory: Negative.  Negative for apnea, cough, choking, chest tightness, shortness of breath, wheezing and stridor.    Cardiovascular: Negative.  Negative for chest pain, palpitations and leg swelling.   Gastrointestinal: Negative.  Negative for abdominal distention, abdominal pain, constipation, diarrhea, nausea and vomiting.   Endocrine: Negative.  Negative for cold intolerance, heat intolerance, polydipsia, polyphagia and polyuria.   Genitourinary: Negative for difficulty urinating, dysuria, flank pain and frequency.   Musculoskeletal: Negative for arthralgias, back pain, gait problem, joint swelling, myalgias, neck pain and neck stiffness.   Skin: Negative for color change, pallor, rash and wound.   Allergic/Immunologic: Negative for environmental allergies, food allergies and immunocompromised state.   Neurological: Negative for dizziness, tremors, seizures, syncope, facial asymmetry, speech difficulty, weakness, light-headedness,  "numbness and headaches.   Hematological: Negative for adenopathy. Does not bruise/bleed easily.   Psychiatric/Behavioral: Negative for agitation, behavioral problems, confusion, decreased concentration, dysphoric mood, hallucinations, self-injury, sleep disturbance and suicidal ideas. The patient is not nervous/anxious and is not hyperactive.         Objective:     /74 (BP Location: Left arm, Patient Position: Sitting, Cuff Size: Adult)   Pulse 71   Ht 167.6 cm (66\")   Wt 77.5 kg (170 lb 14.4 oz)   LMP  (LMP Unknown)   SpO2 98%   BMI 27.58 kg/m²     Physical Exam   Constitutional: She is oriented to person, place, and time. She appears well-developed.   HENT:   Head: Normocephalic.   Right Ear: External ear normal.   Left Ear: External ear normal.   Nose: Nose normal.   Eyes: Conjunctivae and EOM are normal. No scleral icterus.   Neck: Normal range of motion. Neck supple. No tracheal deviation present. No thyromegaly present.   Cardiovascular: Normal rate, regular rhythm, normal heart sounds and intact distal pulses. Exam reveals no gallop and no friction rub.   No murmur heard.  Right carotid bruit    Pulmonary/Chest: Effort normal and breath sounds normal. No stridor. No respiratory distress. She has no wheezes. She has no rales. She exhibits no tenderness.   Abdominal: Soft. Bowel sounds are normal. She exhibits no distension and no mass. There is no tenderness. There is no rebound and no guarding.   Musculoskeletal: Normal range of motion. She exhibits no tenderness or deformity.   Lymphadenopathy:     She has no cervical adenopathy.   Neurological: She is alert and oriented to person, place, and time. She displays normal reflexes. She exhibits normal muscle tone. Coordination normal.   Skin: No rash noted. No erythema. No pallor.   Psychiatric: She has a normal mood and affect. Her behavior is normal. Judgment and thought content normal.       Lab Review            Assessment/Plan       ICD-10-CM " ICD-9-CM   1. Hypothyroidism due to Hashimoto's thyroiditis E03.8 244.8    E06.3 245.2   2. Vitamin D deficiency E55.9 268.9   3. B12 deficiency E53.8 266.2   4. Osteoporosis, unspecified osteoporosis type, unspecified pathological fracture presence M81.0 733.00           Hypothyroidism    on Levothyroxine 88 mcgs , 6.5 tabs per week - one hour before supper or at bedtime    Please go back to daily - increase to 7.5    Also on nexium - now dexilant  and ca + vit D - in a.m.     Lab Results   Component Value Date    TSH 1.070 07/21/2020    TSH 3.050 10/31/2019    TSH 1.320 04/29/2019               ---    vit D Def, Osteopenia    Lab Results   Component Value Date    ANYC95QC 87.8 07/21/2020    OFTG59HR 62.7 10/31/2019    AKAL37CN 23.1 (L) 04/29/2019         DXA , May 2018  on 600 D + 400 Calcium    add extra 1000 u daily - stop     Add 50 th u every 2 weeks - every week    Last bone density May 2020     ---    Pseudonodules last US from June 2015 compared to Nov 2016     Repeat and if stable that would be plast     --    on nexium - now on dexilant   she rightufully requests for Mg assessment and nl     --    Sec polycythemia    reasses    If persistent do sleep study - resolved    Lab Results   Component Value Date    WBC 5.34 07/21/2020    HGB 15.7 07/21/2020    HCT 44.6 07/21/2020    MCV 87.5 07/21/2020     07/21/2020       Right carotid bruit, carotid US done     I reviewed and summarized records from Barbie Murray MD from present year  and I reviewed / ordered labs.     No orders of the defined types were placed in this encounter.        A copy of my note was sent to Barbie Murray MD    Please see my above opinion and suggestions.

## 2020-08-04 ENCOUNTER — HOSPITAL ENCOUNTER (OUTPATIENT)
Dept: ULTRASOUND IMAGING | Facility: HOSPITAL | Age: 75
Discharge: HOME | End: 2020-08-04
Admitting: INTERNAL MEDICINE

## 2020-08-04 PROCEDURE — 93880 EXTRACRANIAL BILAT STUDY: CPT

## 2020-08-06 ENCOUNTER — TELEPHONE (OUTPATIENT)
Dept: ENDOCRINOLOGY | Facility: CLINIC | Age: 75
End: 2020-08-06

## 2020-08-31 RX ORDER — DEXLANSOPRAZOLE 60 MG/1
1 CAPSULE, DELAYED RELEASE ORAL DAILY
Qty: 30 CAPSULE | Refills: 3 | Status: SHIPPED | OUTPATIENT
Start: 2020-08-31 | End: 2020-09-25 | Stop reason: SDUPTHER

## 2020-09-27 RX ORDER — DEXLANSOPRAZOLE 60 MG/1
1 CAPSULE, DELAYED RELEASE ORAL DAILY
Qty: 30 CAPSULE | Refills: 3 | Status: SHIPPED | OUTPATIENT
Start: 2020-09-27 | End: 2020-12-30 | Stop reason: SDUPTHER

## 2020-12-30 ENCOUNTER — TELEPHONE (OUTPATIENT)
Dept: ENDOCRINOLOGY | Facility: CLINIC | Age: 75
End: 2020-12-30

## 2020-12-30 ENCOUNTER — OFFICE VISIT (OUTPATIENT)
Dept: GASTROENTEROLOGY | Facility: CLINIC | Age: 75
End: 2020-12-30

## 2020-12-30 VITALS
HEIGHT: 66 IN | DIASTOLIC BLOOD PRESSURE: 78 MMHG | BODY MASS INDEX: 28.16 KG/M2 | SYSTOLIC BLOOD PRESSURE: 125 MMHG | WEIGHT: 175.2 LBS | HEART RATE: 92 BPM

## 2020-12-30 DIAGNOSIS — R10.10 PAIN OF UPPER ABDOMEN: ICD-10-CM

## 2020-12-30 DIAGNOSIS — K21.00 GASTROESOPHAGEAL REFLUX DISEASE WITH ESOPHAGITIS WITHOUT HEMORRHAGE: Primary | ICD-10-CM

## 2020-12-30 DIAGNOSIS — K44.9 HIATAL HERNIA: ICD-10-CM

## 2020-12-30 PROCEDURE — 99213 OFFICE O/P EST LOW 20 MIN: CPT | Performed by: PHYSICIAN ASSISTANT

## 2020-12-30 RX ORDER — DEXLANSOPRAZOLE 60 MG/1
1 CAPSULE, DELAYED RELEASE ORAL DAILY
Qty: 90 CAPSULE | Refills: 1 | Status: SHIPPED | OUTPATIENT
Start: 2020-12-30 | End: 2021-05-27 | Stop reason: SDUPTHER

## 2020-12-30 NOTE — TELEPHONE ENCOUNTER
Pt came into office asking for refills of     levothyroxine (SYNTHROID, LEVOTHROID) 88 MCG tablet     [facility] [place] [address] AT [facility] [place] [address] - [phone]  - [phone] FX

## 2020-12-31 RX ORDER — LEVOTHYROXINE SODIUM 88 UG/1
TABLET ORAL
Qty: 96 TABLET | Refills: 3 | Status: SHIPPED | OUTPATIENT
Start: 2020-12-31 | End: 2021-01-25 | Stop reason: SDUPTHER

## 2021-01-18 ENCOUNTER — LAB (OUTPATIENT)
Dept: LAB | Facility: HOSPITAL | Age: 76
End: 2021-01-18

## 2021-01-18 LAB
25(OH)D3 SERPL-MCNC: 68.7 NG/ML (ref 30–100)
ALBUMIN SERPL-MCNC: 4.3 G/DL (ref 3.5–5.2)
ALBUMIN/GLOB SERPL: 2.2 G/DL
ALP SERPL-CCNC: 93 U/L (ref 39–117)
ALT SERPL W P-5'-P-CCNC: 16 U/L (ref 1–33)
ANION GAP SERPL CALCULATED.3IONS-SCNC: 9.7 MMOL/L (ref 5–15)
AST SERPL-CCNC: 25 U/L (ref 1–32)
BASOPHILS # BLD AUTO: 0.04 10*3/MM3 (ref 0–0.2)
BASOPHILS NFR BLD AUTO: 0.8 % (ref 0–1.5)
BILIRUB SERPL-MCNC: 0.7 MG/DL (ref 0–1.2)
BUN SERPL-MCNC: 15 MG/DL (ref 8–23)
BUN/CREAT SERPL: 21.7 (ref 7–25)
CALCIUM SPEC-SCNC: 9.6 MG/DL (ref 8.6–10.5)
CHLORIDE SERPL-SCNC: 107 MMOL/L (ref 98–107)
CO2 SERPL-SCNC: 27.3 MMOL/L (ref 22–29)
CREAT SERPL-MCNC: 0.69 MG/DL (ref 0.57–1)
DEPRECATED RDW RBC AUTO: 37.5 FL (ref 37–54)
EOSINOPHIL # BLD AUTO: 0.09 10*3/MM3 (ref 0–0.4)
EOSINOPHIL NFR BLD AUTO: 1.7 % (ref 0.3–6.2)
ERYTHROCYTE [DISTWIDTH] IN BLOOD BY AUTOMATED COUNT: 11.8 % (ref 12.3–15.4)
GFR SERPL CREATININE-BSD FRML MDRD: 83 ML/MIN/1.73
GLOBULIN UR ELPH-MCNC: 2 GM/DL
GLUCOSE SERPL-MCNC: 93 MG/DL (ref 65–99)
HCT VFR BLD AUTO: 44.6 % (ref 34–46.6)
HGB BLD-MCNC: 15.4 G/DL (ref 12–15.9)
IMM GRANULOCYTES # BLD AUTO: 0.02 10*3/MM3 (ref 0–0.05)
IMM GRANULOCYTES NFR BLD AUTO: 0.4 % (ref 0–0.5)
LYMPHOCYTES # BLD AUTO: 1.33 10*3/MM3 (ref 0.7–3.1)
LYMPHOCYTES NFR BLD AUTO: 25.6 % (ref 19.6–45.3)
MCH RBC QN AUTO: 30.7 PG (ref 26.6–33)
MCHC RBC AUTO-ENTMCNC: 34.5 G/DL (ref 31.5–35.7)
MCV RBC AUTO: 88.8 FL (ref 79–97)
MONOCYTES # BLD AUTO: 0.45 10*3/MM3 (ref 0.1–0.9)
MONOCYTES NFR BLD AUTO: 8.7 % (ref 5–12)
NEUTROPHILS NFR BLD AUTO: 3.26 10*3/MM3 (ref 1.7–7)
NEUTROPHILS NFR BLD AUTO: 62.8 % (ref 42.7–76)
NRBC BLD AUTO-RTO: 0 /100 WBC (ref 0–0.2)
PLATELET # BLD AUTO: 319 10*3/MM3 (ref 140–450)
PMV BLD AUTO: 9.9 FL (ref 6–12)
POTASSIUM SERPL-SCNC: 4.3 MMOL/L (ref 3.5–5.2)
PROT SERPL-MCNC: 6.3 G/DL (ref 6–8.5)
RBC # BLD AUTO: 5.02 10*6/MM3 (ref 3.77–5.28)
SODIUM SERPL-SCNC: 144 MMOL/L (ref 136–145)
TSH SERPL DL<=0.05 MIU/L-ACNC: 0.91 UIU/ML (ref 0.27–4.2)
WBC # BLD AUTO: 5.19 10*3/MM3 (ref 3.4–10.8)

## 2021-01-18 PROCEDURE — 80053 COMPREHEN METABOLIC PANEL: CPT | Performed by: INTERNAL MEDICINE

## 2021-01-18 PROCEDURE — 36415 COLL VENOUS BLD VENIPUNCTURE: CPT | Performed by: INTERNAL MEDICINE

## 2021-01-18 PROCEDURE — 84443 ASSAY THYROID STIM HORMONE: CPT | Performed by: INTERNAL MEDICINE

## 2021-01-18 PROCEDURE — 82306 VITAMIN D 25 HYDROXY: CPT | Performed by: INTERNAL MEDICINE

## 2021-01-18 PROCEDURE — 85025 COMPLETE CBC W/AUTO DIFF WBC: CPT | Performed by: INTERNAL MEDICINE

## 2021-01-25 ENCOUNTER — OFFICE VISIT (OUTPATIENT)
Dept: FAMILY MEDICINE CLINIC | Facility: CLINIC | Age: 76
End: 2021-01-25

## 2021-01-25 ENCOUNTER — OFFICE VISIT (OUTPATIENT)
Dept: ENDOCRINOLOGY | Facility: CLINIC | Age: 76
End: 2021-01-25

## 2021-01-25 VITALS
SYSTOLIC BLOOD PRESSURE: 140 MMHG | DIASTOLIC BLOOD PRESSURE: 90 MMHG | HEIGHT: 66 IN | HEART RATE: 86 BPM | BODY MASS INDEX: 28.12 KG/M2 | OXYGEN SATURATION: 95 % | WEIGHT: 175 LBS

## 2021-01-25 VITALS
OXYGEN SATURATION: 98 % | DIASTOLIC BLOOD PRESSURE: 62 MMHG | WEIGHT: 175 LBS | HEART RATE: 79 BPM | SYSTOLIC BLOOD PRESSURE: 110 MMHG | HEIGHT: 66 IN | BODY MASS INDEX: 28.12 KG/M2

## 2021-01-25 DIAGNOSIS — E55.9 VITAMIN D DEFICIENCY: ICD-10-CM

## 2021-01-25 DIAGNOSIS — M81.0 OSTEOPOROSIS, UNSPECIFIED OSTEOPOROSIS TYPE, UNSPECIFIED PATHOLOGICAL FRACTURE PRESENCE: ICD-10-CM

## 2021-01-25 DIAGNOSIS — Z11.59 NEED FOR HEPATITIS C SCREENING TEST: ICD-10-CM

## 2021-01-25 DIAGNOSIS — E06.3 HYPOTHYROIDISM DUE TO HASHIMOTO'S THYROIDITIS: Primary | ICD-10-CM

## 2021-01-25 DIAGNOSIS — R97.8 ELEVATED TUMOR MARKERS: ICD-10-CM

## 2021-01-25 DIAGNOSIS — I65.21 ATHEROSCLEROSIS OF RIGHT CAROTID ARTERY: ICD-10-CM

## 2021-01-25 DIAGNOSIS — Z12.31 ENCOUNTER FOR SCREENING MAMMOGRAM FOR BREAST CANCER: ICD-10-CM

## 2021-01-25 DIAGNOSIS — E03.8 HYPOTHYROIDISM DUE TO HASHIMOTO'S THYROIDITIS: Primary | ICD-10-CM

## 2021-01-25 DIAGNOSIS — K21.00 GASTROESOPHAGEAL REFLUX DISEASE WITH ESOPHAGITIS WITHOUT HEMORRHAGE: Primary | ICD-10-CM

## 2021-01-25 DIAGNOSIS — E53.8 B12 DEFICIENCY: ICD-10-CM

## 2021-01-25 PROBLEM — Z80.41 FH: OVARIAN CANCER IN FIRST DEGREE RELATIVE: Status: RESOLVED | Noted: 2017-01-03 | Resolved: 2021-01-25

## 2021-01-25 PROBLEM — R10.10 PAIN OF UPPER ABDOMEN: Status: RESOLVED | Noted: 2020-01-06 | Resolved: 2021-01-25

## 2021-01-25 PROCEDURE — 99214 OFFICE O/P EST MOD 30 MIN: CPT | Performed by: INTERNAL MEDICINE

## 2021-01-25 PROCEDURE — 99213 OFFICE O/P EST LOW 20 MIN: CPT | Performed by: FAMILY MEDICINE

## 2021-01-25 RX ORDER — CHOLECALCIFEROL (VITAMIN D3) 1250 MCG
50000 CAPSULE ORAL
Qty: 12 CAPSULE | Refills: 11 | Status: SHIPPED | OUTPATIENT
Start: 2021-01-25 | End: 2022-01-27 | Stop reason: SDUPTHER

## 2021-01-25 RX ORDER — LEVOTHYROXINE SODIUM 88 UG/1
TABLET ORAL
Qty: 96 TABLET | Refills: 3 | Status: SHIPPED | OUTPATIENT
Start: 2021-01-25 | End: 2021-07-26 | Stop reason: SDUPTHER

## 2021-01-25 NOTE — PROGRESS NOTES
"Chief Complaint  Follow-up (6 mo thyroid recheck )    Subjective          Akiko Blackwell presents to [facility] ENDOCRINOLOGY for   History of Present Illness  followup      reason - hypothyroidism     duration - March 2013     alleviating factors - levothyroxine     symptoms - hair falling and brittle nails - improved     --     thyroid nodule  personal interpretation , pseudonodule     Feb 2015 compared to Nov 2016   my interpretation - pseudonodules   radiologist interpreation - slight increase in size from 9 mm to 1 cm of largest nodule                ---     Ostepenia    On vit D and calcium      h o  jaw fracture,      Her only present problem is GERD      Objective   Vital Signs:   /62 (BP Location: Right arm, Patient Position: Sitting, Cuff Size: Large Adult)   Pulse 79   Ht 167.6 cm (66\")   Wt 79.4 kg (175 lb)   SpO2 98%   BMI 28.25 kg/m²     Physical Exam  Constitutional:       Appearance: Normal appearance.   HENT:      Head: Normocephalic.   Neck:      Musculoskeletal: Normal range of motion and neck supple.   Cardiovascular:      Rate and Rhythm: Normal rate and regular rhythm.   Pulmonary:      Effort: Pulmonary effort is normal.      Breath sounds: Normal breath sounds.   Musculoskeletal:      Right lower leg: No edema.      Left lower leg: No edema.   Neurological:      Mental Status: She is alert.        Result Review :   The following data was reviewed by: Maximino Paige MD on 01/25/2021:  Common labs    Common Labsle 7/21/20 7/21/20 1/18/21 1/18/21    0744 0744 0830 0830   BUN  14  15   Creatinine  0.79  0.69   eGFR Non African Am  71  83   Sodium  142  144   Potassium  4.4  4.3   Chloride  104  107   Calcium  10.1  9.6   Albumin  4.40  4.30   Total Bilirubin  0.5  0.7   Alkaline Phosphatase  97  93   AST (SGOT)  19  25   ALT (SGPT)  15  16   WBC 5.34  5.19    Hemoglobin 15.7  15.4    Hematocrit 44.6  44.6    Platelets 300  319        "       Thyroid Workup    Lab Results   Component Value Date    TSH 0.908 01/18/2021    TSH 1.070 07/21/2020       Lab Results   Component Value Date    FREET4 1.23 04/15/2015    FREET4 1.48 02/26/2015       No results found for: T3FREE    TPO antibodies    Lab Results   Component Value Date    THYROIDAB 25.1 (H) 02/04/2014       TSI antibodies    No results found for: THYRSTIMIMMU    --------------------------    Lab Results   Component Value Date    UEDQVRVF77 302 07/21/2020       ---------------------------    Lab Results   Component Value Date    DFOA83JE 68.7 01/18/2021    JOFW33KL 87.8 07/21/2020                              Assessment and Plan    Problem List Items Addressed This Visit        Other    Hypothyroidism due to Hashimoto's thyroiditis - Primary    Vitamin D deficiency    B12 deficiency      Other Visit Diagnoses     Osteoporosis, unspecified osteoporosis type, unspecified pathological fracture presence                      Hypothyroidism     on Levothyroxine 88 mcgs , 6.5 tabs per week - one hour before supper or at bedtime     Please go back to daily - increase to 7.5     Also on nexium - now dexilant  and ca + vit D - in a.m.                  ---     Osteopenia    COMPARISON: DEXA 5/30/2018     FINDINGS:      PA Spine L1-L4         Density: 0.834 g/cm2  T-score: -1.9   Z-score: 0.5     2.3% decrease from most recent prior.     Mean Bilateral Femoral Neck            Density: 0.753 g/cm2  T-score: -0.9   Z-score: 1.3           on 600 D + 400 Calcium     add extra 1000 u daily - stop      Add 50 th u every 2 weeks - every week     Last bone density May 2020      ---     Pseudonodules last US from June 2015 compared to Nov 2016      Repeat and stable , repeat only if clinically there is a palpable nodule      --     on nexium - now on dexilant   she rightufully requests for Mg assessment and nl      --     Sec polycythemia     reasses     If persistent do sleep study - resolved           Right carotid  bruit, carotid US done ,    IMPRESSION:  1. There is 50-70% stenosis in the distal right ICA by peak  velocity criteria without significant plaque appreciated. This  may be due to tortuosity.      2. No significant atherosclerotic plaque       Reassess cholesterol   I reviewed and summarized records from [facility], Barbie Oliveros MD from present year  and I reviewed / ordered labs.      No orders of the defined types were placed in this encounter.         Follow Up   No follow-ups on file.  Patient was given instructions and counseling regarding her condition or for health maintenance advice. Please see specific information pulled into the AVS if appropriate.

## 2021-01-25 NOTE — ACP (ADVANCE CARE PLANNING)
Patient has a living will but does not have it on file with [facility]. Have asked to mail or bring a copy for scanning to her medical records.      This document has been electronically signed by Barbie Murray MD on January 25, 2021 10:12 CST

## 2021-01-25 NOTE — PROGRESS NOTES
Subjective:     Akiko Blackwell is a 75 y.o. female for her annual exam. She has dentures and does not visit the dentist. Patient had an eye exam recently. She does have a living will but does not have it on file with us.    GERD  Joset complains of heartburn. This has been associated with cough and need to clear throat frequently.  She denies choking on food, difficulty swallowing and shortness of breath. Symptoms have been present for several years. She denies dysphagia. She has not lost weight. She denies melena, hematochezia, hematemesis, and coffee ground emesis. Medical therapy in the past has included H2 antagonists and proton pump inhibitors. She had an EGD performed on 1/7/2020 that showed severe esophagitis and gastric polyps.  Above history reviewed and updated. Patient is actively following with GI now. She has been switched to dexilant which she finds somewhat effective.  She does have to be more cautious about what she is eating.  She will have a repeat EGD with her colonoscopy June 2021.     Patient continues to follow with Dr. Stephen for her hypothyroidism.    Past Medical Hx:   Past Medical History:   Diagnosis Date   • Acute bronchitis, unspecified    • Acute laryngitis    • Allergic rhinitis    • Atrophic vaginitis    • Breast cyst    • Diverticular disease of colon    • Encounter for gynecological examination (general) (routine) without abnormal findings    • Encounter for screening for malignant neoplasm of colon    • Encounter for screening for osteoporosis    • Esophagitis    • Family history of malignant neoplasm of gastrointestinal tract    • GERD (gastroesophageal reflux disease)    • History of bone density study 04/29/2016    DXA BONE DENSITY AXIAL [facility] (2)    • History of screening mammography 01/02/2016   • Multiple gastric polyps 01/07/2020   • Osteopenia    • Pain in right knee    • Subclinical hypothyroidism    • Thyroid nodule    • Viral upper  respiratory tract infection    • Vitamin D deficiency        Past Surgical Hx:  Past Surgical History:   Procedure Laterality Date   • BREAST BIOPSY      BX BREAST PERCUT W/O IMAGE 81418 (1)   x2   • CERVIX SURGERY  05/15/1973    Conization biopsy of cervix. Cervical cautery.   • COLONOSCOPY  06/20/2016    Diverticulosis in the sigmoid colon and in the descending colon.External and internal hemorrhoids.No specimens collected.   • DILATATION AND CURETTAGE  10/10/1990    Fractional   • ENDOSCOPY N/A 1/7/2020    Procedure: ESOPHAGOGASTRODUODENOSCOPY WITH DILATATION--possible;  Surgeon: Alfonzo Wahl MD;  Location: [facility] ENDOSCOPY;  Service: Gastroenterology   • ENDOSCOPY W/ PEG TUBE PLACEMENT  06/20/2012    Esophagitis seen. Biopsy taken. Gastritis in stomach. Biopsy taken. Hiatus hernia in stomach. Normal duodenum. Biopsy taken.   • EXCISION LESION  05/18/1967    Excision of inclusion cyst, vagina.   • INJECTION OF MEDICATION  05/04/2016    Kenalog (4)      • PAP SMEAR  07/22/2010    Mild inflammation present. Negative   • TONSILLECTOMY  01/15/1964    Recurring acute tonsillitis.   • UPPER GASTROINTESTINAL ENDOSCOPY  06/20/2012   • UPPER GASTROINTESTINAL ENDOSCOPY  01/07/2020       Health Maintenance:  Health Maintenance   Topic Date Due   • HEPATITIS C SCREENING  01/03/2017   • INFLUENZA VACCINE  08/01/2020   • ANNUAL WELLNESS VISIT  01/15/2023 (Originally 3/16/2018)   • MAMMOGRAM  01/31/2021   • DXA SCAN  08/10/2022   • COLONOSCOPY  06/20/2026   • TDAP/TD VACCINES (2 - Td) 03/16/2027   • Pneumococcal Vaccine 65+  Completed   • ZOSTER VACCINE  Completed   • MENINGOCOCCAL VACCINE  Aged Out       Current Meds:    Current Outpatient Medications:   •  calcium carbonate (TUMS ULTRA) 1000 MG chewable tablet, Chew 1 tablet Daily., Disp: , Rfl:   •  Cholecalciferol (VITAMIN D3) 1.25 MG (54697 UT) capsule, Take 1 capsule by mouth Every 7 (Seven) Days., Disp: 12 capsule, Rfl: 11  •  dexlansoprazole (Dexilant) 60 MG  "capsule, Take 1 capsule by mouth Daily., Disp: 90 capsule, Rfl: 1  •  levothyroxine (SYNTHROID, LEVOTHROID) 88 MCG tablet, Take 1 tab daily Monday to Saturday and 1.5 tabs on Sunday, Disp: 96 tablet, Rfl: 3    Allergies:  Patient has no known allergies.    Family Hx:  Family History   Problem Relation Age of Onset   • Ovarian cancer Mother         Onset age 70-74 years.   • Cancer Brother         Colorectal Cancer, onset age 70-74 years.   • Colon cancer Brother    • Diabetes Other         Social History:  Social History     Socioeconomic History   • Marital status:      Spouse name: Not on file   • Number of children: Not on file   • Years of education: Not on file   • Highest education level: Not on file   Tobacco Use   • Smoking status: Former Smoker   • Smokeless tobacco: Never Used   Substance and Sexual Activity   • Alcohol use: No   • Drug use: No   • Sexual activity: Defer       Review of Systems  Review of Systems   Constitutional: Negative for activity change, appetite change, fatigue and fever.   HENT: Negative for ear pain and sore throat.    Eyes: Negative for pain and visual disturbance.   Respiratory: Negative for shortness of breath.    Cardiovascular: Negative for chest pain and palpitations.   Gastrointestinal: Negative for abdominal pain, nausea and vomiting.        Heartburn   Endocrine: Negative for cold intolerance and heat intolerance.   Genitourinary: Negative for difficulty urinating and dysuria.   Musculoskeletal: Positive for arthralgias. Negative for gait problem.   Skin: Negative for color change and rash.   Neurological: Negative for dizziness, weakness and headaches.   Hematological: Negative for adenopathy. Does not bruise/bleed easily.   Psychiatric/Behavioral: Negative for agitation, confusion and sleep disturbance.       Objective:     /90   Pulse 86   Ht 167.6 cm (66\")   Wt 79.4 kg (175 lb)   LMP  (LMP Unknown)   SpO2 95%   BMI 28.25 kg/m²   Physical Exam   "   Constitutional: She is oriented to person, place, and time. She appears well-developed.   HENT:   Head: Normocephalic and atraumatic.   Right Ear: Hearing, tympanic membrane, external ear and ear canal normal.   Left Ear: Hearing, tympanic membrane, external ear and ear canal normal.   Nose: Nose normal.   Mouth/Throat: Mucous membranes are moist. Oropharynx is clear.   Eyes: Pupils are equal, round, and reactive to light. Conjunctivae are normal.   Neck: Normal range of motion. Neck supple.   Cardiovascular: Normal rate, regular rhythm and normal heart sounds.   Pulmonary/Chest: Effort normal and breath sounds normal.   Abdominal: Soft. Normal appearance and bowel sounds are normal. She exhibits no distension. There is no abdominal tenderness.   Musculoskeletal: Normal range of motion.   Neurological: She is alert and oriented to person, place, and time. She has normal motor skills and intact cranial nerves. Gait and coordination normal. GCS eye subscore is 4. GCS verbal subscore is 5. GCS motor subscore is 6.   Reflex Scores:       Patellar reflexes are 2+ on the right side and 2+ on the left side.  Skin: Skin is warm and dry.   Psychiatric: Her speech is normal and behavior is normal. Mood, judgment and thought content normal.             Assessment:       1. Gastroesophageal reflux disease with esophagitis without hemorrhage    2. Need for hepatitis C screening test    3. Encounter for screening mammogram for breast cancer         Plan:     1.  Rx changes: none  2.  Follow up: 1 year and as needed   3. Continue follow up with Endocrinology. Labwork ordered for screening for hepatitis C.   4. Mammogram ordered.    5. Have requested records from [facility] for her Influenza vaccination.     Goals        Weight    • Weight (lb) < 66 kg (145 lb 8.1 oz)      Barriers to goals: None            Preventative:  Female Preventative: Colon cancer screening is up to date, due again in June  2021  Recommended:Covid-19   Patient is a non smoker.   does not drink  eat more fruits and vegetables, decrease soda or juice intake, increase water intake, increase physical activity, plan meals, eat breakfast and have 3 meals a day   Patient's Body mass index is 28.25 kg/m². BMI is above normal parameters. Recommendations include: exercise counseling and nutrition counseling.  RISK SCORE: 3       This document has been electronically signed by Barbie Murray MD on January 25, 2021 10:15 CST

## 2021-01-27 ENCOUNTER — IMMUNIZATION (OUTPATIENT)
Dept: VACCINE CLINIC | Facility: HOSPITAL | Age: 76
End: 2021-01-27

## 2021-01-27 PROCEDURE — 0001A: CPT | Performed by: THORACIC SURGERY (CARDIOTHORACIC VASCULAR SURGERY)

## 2021-01-27 PROCEDURE — 91300 HC SARSCOV02 VAC 30MCG/0.3ML IM: CPT | Performed by: THORACIC SURGERY (CARDIOTHORACIC VASCULAR SURGERY)

## 2021-02-09 PROCEDURE — 87635 SARS-COV-2 COVID-19 AMP PRB: CPT | Performed by: NURSE PRACTITIONER

## 2021-02-19 ENCOUNTER — IMMUNIZATION (OUTPATIENT)
Dept: VACCINE CLINIC | Facility: HOSPITAL | Age: 76
End: 2021-02-19

## 2021-02-19 PROCEDURE — 0002A: CPT | Performed by: THORACIC SURGERY (CARDIOTHORACIC VASCULAR SURGERY)

## 2021-02-19 PROCEDURE — 91300 HC SARSCOV02 VAC 30MCG/0.3ML IM: CPT | Performed by: THORACIC SURGERY (CARDIOTHORACIC VASCULAR SURGERY)

## 2021-05-27 ENCOUNTER — OFFICE VISIT (OUTPATIENT)
Dept: GASTROENTEROLOGY | Facility: CLINIC | Age: 76
End: 2021-05-27

## 2021-05-27 VITALS
SYSTOLIC BLOOD PRESSURE: 132 MMHG | HEART RATE: 84 BPM | BODY MASS INDEX: 28.96 KG/M2 | HEIGHT: 66 IN | WEIGHT: 180.2 LBS | DIASTOLIC BLOOD PRESSURE: 71 MMHG

## 2021-05-27 DIAGNOSIS — K44.9 HIATAL HERNIA: ICD-10-CM

## 2021-05-27 DIAGNOSIS — Z86.010 HISTORY OF COLON POLYPS: ICD-10-CM

## 2021-05-27 DIAGNOSIS — K21.00 GASTROESOPHAGEAL REFLUX DISEASE WITH ESOPHAGITIS WITHOUT HEMORRHAGE: Primary | ICD-10-CM

## 2021-05-27 DIAGNOSIS — R10.10 PAIN OF UPPER ABDOMEN: ICD-10-CM

## 2021-05-27 PROCEDURE — 99214 OFFICE O/P EST MOD 30 MIN: CPT | Performed by: PHYSICIAN ASSISTANT

## 2021-05-27 RX ORDER — POLYETHYLENE GLYCOL 3350, SODIUM SULFATE, SODIUM CHLORIDE, POTASSIUM CHLORIDE, ASCORBIC ACID, SODIUM ASCORBATE 140-9-5.2G
1 KIT ORAL TAKE AS DIRECTED
Qty: 1 EACH | Refills: 0 | Status: SHIPPED | OUTPATIENT
Start: 2021-05-27 | End: 2021-05-28

## 2021-05-27 RX ORDER — DEXLANSOPRAZOLE 60 MG/1
1 CAPSULE, DELAYED RELEASE ORAL DAILY
Qty: 90 CAPSULE | Refills: 1 | Status: SHIPPED | OUTPATIENT
Start: 2021-05-27 | End: 2021-07-26

## 2021-05-27 RX ORDER — DEXTROSE AND SODIUM CHLORIDE 5; .45 G/100ML; G/100ML
30 INJECTION, SOLUTION INTRAVENOUS CONTINUOUS PRN
Status: CANCELLED | OUTPATIENT
Start: 2021-06-30

## 2021-06-27 ENCOUNTER — LAB (OUTPATIENT)
Dept: LAB | Facility: HOSPITAL | Age: 76
End: 2021-06-27

## 2021-06-27 DIAGNOSIS — Z01.818 PREOP TESTING: Primary | ICD-10-CM

## 2021-06-27 LAB — SARS-COV-2 N GENE RESP QL NAA+PROBE: NOT DETECTED

## 2021-06-27 PROCEDURE — C9803 HOPD COVID-19 SPEC COLLECT: HCPCS

## 2021-06-27 PROCEDURE — 87635 SARS-COV-2 COVID-19 AMP PRB: CPT

## 2021-06-30 ENCOUNTER — ANESTHESIA EVENT (OUTPATIENT)
Dept: GASTROENTEROLOGY | Facility: HOSPITAL | Age: 76
End: 2021-06-30

## 2021-06-30 ENCOUNTER — HOSPITAL ENCOUNTER (OUTPATIENT)
Facility: HOSPITAL | Age: 76
Setting detail: HOSPITAL OUTPATIENT SURGERY
Discharge: HOME | End: 2021-06-30
Attending: INTERNAL MEDICINE | Admitting: INTERNAL MEDICINE

## 2021-06-30 ENCOUNTER — ANESTHESIA (OUTPATIENT)
Dept: GASTROENTEROLOGY | Facility: HOSPITAL | Age: 76
End: 2021-06-30

## 2021-06-30 VITALS
DIASTOLIC BLOOD PRESSURE: 56 MMHG | RESPIRATION RATE: 18 BRPM | OXYGEN SATURATION: 97 % | HEIGHT: 66 IN | TEMPERATURE: 97.1 F | SYSTOLIC BLOOD PRESSURE: 116 MMHG | HEART RATE: 86 BPM | WEIGHT: 177.25 LBS | BODY MASS INDEX: 28.49 KG/M2

## 2021-06-30 DIAGNOSIS — Z86.010 HISTORY OF COLON POLYPS: ICD-10-CM

## 2021-06-30 DIAGNOSIS — R10.10 PAIN OF UPPER ABDOMEN: ICD-10-CM

## 2021-06-30 DIAGNOSIS — K21.00 GASTROESOPHAGEAL REFLUX DISEASE WITH ESOPHAGITIS WITHOUT HEMORRHAGE: ICD-10-CM

## 2021-06-30 DIAGNOSIS — K44.9 HIATAL HERNIA: ICD-10-CM

## 2021-06-30 PROCEDURE — 43239 EGD BIOPSY SINGLE/MULTIPLE: CPT | Performed by: INTERNAL MEDICINE

## 2021-06-30 PROCEDURE — 88305 TISSUE EXAM BY PATHOLOGIST: CPT

## 2021-06-30 PROCEDURE — 25010000002 PROPOFOL 10 MG/ML EMULSION: Performed by: NURSE ANESTHETIST, CERTIFIED REGISTERED

## 2021-06-30 PROCEDURE — G0105 COLORECTAL SCRN; HI RISK IND: HCPCS | Performed by: INTERNAL MEDICINE

## 2021-06-30 RX ORDER — PROPOFOL 10 MG/ML
VIAL (ML) INTRAVENOUS AS NEEDED
Status: DISCONTINUED | OUTPATIENT
Start: 2021-06-30 | End: 2021-06-30 | Stop reason: SURG

## 2021-06-30 RX ORDER — DEXTROSE AND SODIUM CHLORIDE 5; .45 G/100ML; G/100ML
30 INJECTION, SOLUTION INTRAVENOUS CONTINUOUS PRN
Status: DISCONTINUED | OUTPATIENT
Start: 2021-06-30 | End: 2021-06-30 | Stop reason: HOSPADM

## 2021-06-30 RX ORDER — LIDOCAINE HYDROCHLORIDE 20 MG/ML
INJECTION, SOLUTION INTRAVENOUS AS NEEDED
Status: DISCONTINUED | OUTPATIENT
Start: 2021-06-30 | End: 2021-06-30 | Stop reason: SURG

## 2021-06-30 RX ORDER — MEPERIDINE HYDROCHLORIDE 25 MG/ML
12.5 INJECTION INTRAMUSCULAR; INTRAVENOUS; SUBCUTANEOUS
Status: DISCONTINUED | OUTPATIENT
Start: 2021-06-30 | End: 2021-06-30 | Stop reason: HOSPADM

## 2021-06-30 RX ORDER — PROMETHAZINE HYDROCHLORIDE 25 MG/1
25 TABLET ORAL ONCE AS NEEDED
Status: DISCONTINUED | OUTPATIENT
Start: 2021-06-30 | End: 2021-06-30 | Stop reason: HOSPADM

## 2021-06-30 RX ORDER — PROMETHAZINE HYDROCHLORIDE 25 MG/1
25 SUPPOSITORY RECTAL ONCE AS NEEDED
Status: DISCONTINUED | OUTPATIENT
Start: 2021-06-30 | End: 2021-06-30 | Stop reason: HOSPADM

## 2021-06-30 RX ORDER — ONDANSETRON 2 MG/ML
4 INJECTION INTRAMUSCULAR; INTRAVENOUS ONCE AS NEEDED
Status: DISCONTINUED | OUTPATIENT
Start: 2021-06-30 | End: 2021-06-30 | Stop reason: HOSPADM

## 2021-06-30 RX ADMIN — PROPOFOL 50 MG: 10 INJECTION, EMULSION INTRAVENOUS at 13:16

## 2021-06-30 RX ADMIN — DEXTROSE AND SODIUM CHLORIDE 30 ML/HR: 5; 450 INJECTION, SOLUTION INTRAVENOUS at 12:47

## 2021-06-30 RX ADMIN — LIDOCAINE HYDROCHLORIDE 100 MG: 20 INJECTION, SOLUTION INTRAVENOUS at 13:16

## 2021-06-30 RX ADMIN — PROPOFOL 50 MG: 10 INJECTION, EMULSION INTRAVENOUS at 13:21

## 2021-06-30 RX ADMIN — PROPOFOL 50 MG: 10 INJECTION, EMULSION INTRAVENOUS at 13:26

## 2021-06-30 RX ADMIN — PROPOFOL 50 MG: 10 INJECTION, EMULSION INTRAVENOUS at 13:30

## 2021-06-30 NOTE — ANESTHESIA POSTPROCEDURE EVALUATION
Patient: Akiko Blackwell    Procedure Summary     Date: 06/30/21 Room / Location: [facility] ENDOSCOPY 1 / [facility] ENDOSCOPY    Anesthesia Start: 1312 Anesthesia Stop: 1336    Procedures:       ESOPHAGOGASTRODUODENOSCOPY (N/A )      COLONOSCOPY (N/A ) Diagnosis:       Gastroesophageal reflux disease with esophagitis without hemorrhage      Hiatal hernia      Pain of upper abdomen      History of colon polyps      (Gastroesophageal reflux disease with esophagitis without hemorrhage [K21.00])      (Hiatal hernia [K44.9])      (Pain of upper abdomen [R10.10])      (History of colon polyps [Z86.010])    Surgeons: Alfonzo Wahl MD Provider: Daren Bucio CRNA    Anesthesia Type: MAC ASA Status: 3          Anesthesia Type: MAC    Vitals  No vitals data found for the desired time range.          Post Anesthesia Care and Evaluation    Patient location during evaluation: bedside  Patient participation: complete - patient cannot participate  Level of consciousness: awake  Pain score: 0  Pain management: adequate  Airway patency: patent  Anesthetic complications: No anesthetic complications  PONV Status: none  Cardiovascular status: acceptable  Respiratory status: acceptable  Hydration status: acceptable

## 2021-06-30 NOTE — ANESTHESIA PREPROCEDURE EVALUATION
Anesthesia Evaluation     Patient summary reviewed and Nursing notes reviewed   NPO Solid Status: > 8 hours  NPO Liquid Status: > 8 hours           Airway   Mallampati: III  TM distance: >3 FB  Neck ROM: full  No difficulty expected  Dental - normal exam     Pulmonary - normal exam   (+) recent URI,   Cardiovascular - normal exam        Neuro/Psych  GI/Hepatic/Renal/Endo    (+)  hiatal hernia, GERD,      Musculoskeletal     Abdominal    Substance History      OB/GYN          Other   arthritis,                        Anesthesia Plan    ASA 3     MAC     intravenous induction     Anesthetic plan, all risks, benefits, and alternatives have been provided, discussed and informed consent has been obtained with: patient.    Plan discussed with CRNA.

## 2021-07-06 LAB
LAB AP CASE REPORT: NORMAL
PATH REPORT.FINAL DX SPEC: NORMAL

## 2021-07-08 ENCOUNTER — OFFICE VISIT (OUTPATIENT)
Dept: GASTROENTEROLOGY | Facility: CLINIC | Age: 76
End: 2021-07-08

## 2021-07-08 VITALS
WEIGHT: 179.2 LBS | HEART RATE: 87 BPM | HEIGHT: 65 IN | SYSTOLIC BLOOD PRESSURE: 137 MMHG | BODY MASS INDEX: 29.85 KG/M2 | DIASTOLIC BLOOD PRESSURE: 70 MMHG

## 2021-07-08 DIAGNOSIS — K44.9 HIATAL HERNIA: ICD-10-CM

## 2021-07-08 DIAGNOSIS — R10.10 PAIN OF UPPER ABDOMEN: ICD-10-CM

## 2021-07-08 DIAGNOSIS — K21.00 GASTROESOPHAGEAL REFLUX DISEASE WITH ESOPHAGITIS WITHOUT HEMORRHAGE: Primary | ICD-10-CM

## 2021-07-08 DIAGNOSIS — K31.7 FUNDIC GLAND POLYPOSIS OF STOMACH: ICD-10-CM

## 2021-07-08 DIAGNOSIS — K57.90 DIVERTICULOSIS: ICD-10-CM

## 2021-07-08 PROCEDURE — 99214 OFFICE O/P EST MOD 30 MIN: CPT | Performed by: PHYSICIAN ASSISTANT

## 2021-07-08 RX ORDER — NORTRIPTYLINE HYDROCHLORIDE 10 MG/1
10 CAPSULE ORAL NIGHTLY
Qty: 30 CAPSULE | Refills: 2 | Status: SHIPPED | OUTPATIENT
Start: 2021-07-08 | End: 2021-07-19 | Stop reason: SDDI

## 2021-07-08 NOTE — PATIENT INSTRUCTIONS
"BMI for Adults  What is BMI?  Body mass index (BMI) is a number that is calculated from a person's weight and height. BMI can help estimate how much of a person's weight is composed of fat. BMI does not measure body fat directly. Rather, it is an alternative to procedures that directly measure body fat, which can be difficult and expensive.  BMI can help identify people who may be at higher risk for certain medical problems.  What are BMI measurements used for?  BMI is used as a screening tool to identify possible weight problems. It helps determine whether a person is obese, overweight, a healthy weight, or underweight.  BMI is useful for:  · Identifying a weight problem that may be related to a medical condition or may increase the risk for medical problems.  · Promoting changes, such as changes in diet and exercise, to help reach a healthy weight. BMI screening can be repeated to see if these changes are working.  How is BMI calculated?  BMI involves measuring your weight in relation to your height. Both height and weight are measured, and the BMI is calculated from those numbers. This can be done either in English (U.S[place] or metric measurements. Note that charts and online BMI calculators are available to help you find your BMI quickly and easily without having to do these calculations yourself.  To calculate your BMI in English (U.S[place] measurements:    1. Measure your weight in pounds (lb).  2. Multiply the number of pounds by 703.  ? For example, for a person who weighs 180 lb, multiply that number by 703, which equals 126,540.  3. Measure your height in inches. Then multiply that number by itself to get a measurement called \"inches squared.\"  ? For example, for a person who is 70 inches tall, the \"inches squared\" measurement is 70 inches x 70 inches, which equals 4,900 inches squared.  4. Divide the total from step 2 (number of lb x 703) by the total from step 3 (inches squared): 126,540 ÷ 4,900 = 25.8. This is " "your BMI.  To calculate your BMI in metric measurements:  1. Measure your weight in kilograms (kg).  2. Measure your height in meters (m). Then multiply that number by itself to get a measurement called \"meters squared.\"  ? For example, for a person who is 1.75 m tall, the \"meters squared\" measurement is 1.75 m x 1.75 m, which is equal to 3.1 meters squared.  3. Divide the number of kilograms (your weight) by the meters squared number. In this example: 70 ÷ 3.1 = 22.6. This is your BMI.  What do the results mean?  BMI charts are used to identify whether you are underweight, normal weight, overweight, or obese. The following guidelines will be used:  · Underweight: BMI less than 18.5.  · Normal weight: BMI between 18.5 and 24.9.  · Overweight: BMI between 25 and 29.9.  · Obese: BMI of 30 or above.  Keep these notes in mind:  · Weight includes both fat and muscle, so someone with a muscular build, such as an athlete, may have a BMI that is higher than 24.9. In cases like these, BMI is not an accurate measure of body fat.  · To determine if excess body fat is the cause of a BMI of 25 or higher, further assessments may need to be done by a health care provider.  · BMI is usually interpreted in the same way for men and women.  Where to find more information  For more information about BMI, including tools to quickly calculate your BMI, go to these websites:  · Centers for Disease Control and Prevention: www.cdc.gov  · American Heart Association: www.heart.org  · National Heart, Lung, and Blood Reserve: www.nhlbi.nih.gov  Summary  · Body mass index (BMI) is a number that is calculated from a person's weight and height.  · BMI may help estimate how much of a person's weight is composed of fat. BMI can help identify those who may be at higher risk for certain medical problems.  · BMI can be measured using English measurements or metric measurements.  · BMI charts are used to identify whether you are underweight, normal " weight, overweight, or obese.  This information is not intended to replace advice given to you by your health care provider. Make sure you discuss any questions you have with your health care provider.  Document Revised: 09/09/2020 Document Reviewed: 07/17/2020  Elsevier Patient Education © 2021 [employer]

## 2021-07-08 NOTE — PROGRESS NOTES
Chief Complaint   Patient presents with   • Heartburn   • Hiatal Hernia   • Hx Of Colon Polyps       ENDO PROCEDURE ORDERED:    Subjective    Akiko Blackwell is a 75 y.o. female. she is here today for follow-up.    History of Present Illness    Patient seen on recheck of her GERD, hiatal hernia, abdominal pain. Last seen 05/27/2021. Patient underwent EGD/colonoscopy on 06/30/2021, showed significant diffuse gastritis with multiple pedunculated and sessile polyps throughout the entire stomach. These were fundic gland polyps, negative for H. pylori or dysplasia. Antral biopsy showed minimal chronic gastritis, negative for H pylori. Colonoscopy showed internal/external hemorrhoids, multiple diverticula in the sigmoid and descending colon, with adequate prep. Recommended EGD and colonoscopy at 5 years.     Patient just refilled her Dexilant 60 mg with a 90 day supply. She states this controls her reflux well. She previously did well for a long time on Nexium. She denied dysphagia. She is having regular to loose stools, sometimes has more bloating. Weight is down 1 pound since last visit. Prior colonoscopy showed diverticular disease in 2016. Last EGD 01/07/2020 showed severe esophagitis, gastritis, with the fundic gland polyposis. In comparing the photographs, it looks like the polyposis has gotten a little bit more significant. She has been on PPI's for sometime and is concerned about them contributing to this. The esophagus was reported normal but no photograph was taken at her most recent endoscopy.     ASSESSMENT/PLAN: She is encouraged to avoid gastric irritants. She is instructed on high fiber/diverticular diet. I suggested a trial on Pamelor 10 mg at night to see if this helps with her abdominal discomfort. We talked about discontinuing the Dexilant but we are concerned about doing that. We could try switching her to Pepcid. She is very reluctant to stop it completely. After a long discussion she was  agreeable to trying her Dexilant every other day and see how she does with that. We may be able to dose reduce her or will again need to consider another medication. Will plan followup in a few months, sooner if needed, further pending clinical course and results of the above.        The following portions of the patient's history were reviewed and updated as appropriate:   Past Medical History:   Diagnosis Date   • Acute bronchitis, unspecified    • Acute laryngitis    • Allergic rhinitis    • Atrophic vaginitis    • Breast cyst    • Diverticular disease of colon    • Encounter for gynecological examination (general) (routine) without abnormal findings    • Encounter for screening for malignant neoplasm of colon    • Encounter for screening for osteoporosis    • Esophagitis    • Family history of malignant neoplasm of gastrointestinal tract    • GERD (gastroesophageal reflux disease)    • History of bone density study 04/29/2016    DXA BONE DENSITY AXIAL [facility] (2)    • History of screening mammography 01/02/2016   • Multiple gastric polyps 01/07/2020   • Osteopenia    • Pain in right knee    • Subclinical hypothyroidism    • Thyroid nodule    • Viral upper respiratory tract infection    • Vitamin D deficiency      Past Surgical History:   Procedure Laterality Date   • BREAST BIOPSY      BX BREAST PERCUT W/O IMAGE 31413 (1)   x2   • CERVIX SURGERY  05/15/1973    Conization biopsy of cervix. Cervical cautery.   • COLONOSCOPY  06/20/2016    Diverticulosis in the sigmoid colon and in the descending colon.External and internal hemorrhoids.No specimens collected.   • COLONOSCOPY N/A 6/30/2021    Procedure: COLONOSCOPY;  Surgeon: Alfonzo Wahl MD;  Location: [facility] ENDOSCOPY;  Service: Gastroenterology;  Laterality: N/A;   • DILATATION AND CURETTAGE  10/10/1990    Fractional   • ENDOSCOPY N/A 1/7/2020    Procedure: ESOPHAGOGASTRODUODENOSCOPY WITH DILATATION--possible;  Surgeon: Alfonzo Wahl MD;  Location:  [facility] ENDOSCOPY;  Service: Gastroenterology   • ENDOSCOPY N/A 2021    Procedure: ESOPHAGOGASTRODUODENOSCOPY;  Surgeon: Alfonzo Wahl MD;  Location: [facility] ENDOSCOPY;  Service: Gastroenterology;  Laterality: N/A;   • ENDOSCOPY W/ PEG TUBE PLACEMENT  2012    Esophagitis seen. Biopsy taken. Gastritis in stomach. Biopsy taken. Hiatus hernia in stomach. Normal duodenum. Biopsy taken.   • EXCISION LESION  1967    Excision of inclusion cyst, vagina.   • INJECTION OF MEDICATION  2016    Kenalog (4)      • PAP SMEAR  2010    Mild inflammation present. Negative   • TONSILLECTOMY  01/15/1964    Recurring acute tonsillitis.   • UPPER GASTROINTESTINAL ENDOSCOPY  2012   • UPPER GASTROINTESTINAL ENDOSCOPY  2020   • UPPER GASTROINTESTINAL ENDOSCOPY  2021     Family History   Problem Relation Age of Onset   • Ovarian cancer Mother         Onset age 70-74 years.   • Cancer Brother         Colorectal Cancer, onset age 70-74 years.   • Colon cancer Brother    • Diabetes Other      OB History        2    Para   2    Term   2            AB        Living           SAB        TAB        Ectopic        Molar        Multiple        Live Births                  No Known Allergies  Social History     Socioeconomic History   • Marital status:      Spouse name: Not on file   • Number of children: Not on file   • Years of education: Not on file   • Highest education level: Not on file   Tobacco Use   • Smoking status: Former Smoker   • Smokeless tobacco: Never Used   Vaping Use   • Vaping Use: Never used   Substance and Sexual Activity   • Alcohol use: No   • Drug use: No   • Sexual activity: Defer     Current Medications:  Prior to Admission medications    Medication Sig Start Date End Date Taking? Authorizing Provider   calcium carbonate (TUMS ULTRA) 1000 MG chewable tablet Chew 1 tablet Daily.   Yes Provider, MD Elmo   Cholecalciferol (Vitamin D3) 1.25 MG (00346 UT)  "capsule Take 1 capsule by mouth Every 7 (Seven) Days. 1/25/21  Yes Maximino Marina MD   dexlansoprazole (Dexilant) 60 MG capsule Take 1 capsule by mouth Daily. 5/27/21  Yes Henry Caballero PA-C   levothyroxine (SYNTHROID, LEVOTHROID) 88 MCG tablet Take 1 tab daily Monday to Saturday and 1.5 tabs on Sunday 1/25/21  Yes Maximino Marina MD     Review of Systems  Review of Systems       Objective    /70   Pulse 87   Ht 165.1 cm (65\")   Wt 81.3 kg (179 lb 3.2 oz)   LMP  (LMP Unknown)   BMI 29.82 kg/m²   Physical Exam  Vitals and nursing note reviewed.   Constitutional:       General: She is not in acute distress.     Appearance: She is well-developed.   HENT:      Head: Normocephalic and atraumatic.   Eyes:      Pupils: Pupils are equal, round, and reactive to light.   Cardiovascular:      Rate and Rhythm: Normal rate and regular rhythm.      Heart sounds: Normal heart sounds.   Pulmonary:      Effort: Pulmonary effort is normal.      Breath sounds: Normal breath sounds.   Abdominal:      General: Bowel sounds are normal. There is no distension or abdominal bruit.      Palpations: Abdomen is soft. Abdomen is not rigid. There is no shifting dullness or mass.      Tenderness: There is abdominal tenderness. There is no guarding or rebound.      Hernia: No hernia is present. There is no hernia in the ventral area.      Comments: mild   Musculoskeletal:         General: Normal range of motion.      Cervical back: Normal range of motion.   Skin:     General: Skin is warm and dry.   Neurological:      Mental Status: She is alert and oriented to person, place, and time.   Psychiatric:         Behavior: Behavior normal.         Thought Content: Thought content normal.         Judgment: Judgment normal.       Assessment/Plan      1. Gastroesophageal reflux disease with esophagitis without hemorrhage    2. Fundic gland polyposis of stomach    3. Hiatal hernia    4. Pain of upper abdomen    5. " Diverticulosis    .   Diagnoses and all orders for this visit:    1. Gastroesophageal reflux disease with esophagitis without hemorrhage (Primary)    2. Fundic gland polyposis of stomach    3. Hiatal hernia    4. Pain of upper abdomen    5. Diverticulosis    Other orders  -     nortriptyline (Pamelor) 10 MG capsule; Take 1 capsule by mouth Every Night.  Dispense: 30 capsule; Refill: 2        Orders placed during this encounter include:  No orders of the defined types were placed in this encounter.      Medications prescribed:  New Medications Ordered This Visit   Medications   • nortriptyline (Pamelor) 10 MG capsule     Sig: Take 1 capsule by mouth Every Night.     Dispense:  30 capsule     Refill:  2       Requested Prescriptions     Signed Prescriptions Disp Refills   • nortriptyline (Pamelor) 10 MG capsule 30 capsule 2     Sig: Take 1 capsule by mouth Every Night.       Review and/or summary of lab tests, radiology, procedures, medications. Review and summary of old records and obtaining of history. The risks and benefits of my recommendations, as well as other treatment options were discussed with the patient today. Questions were answered.    Follow-up: Return in about 2 months (around 9/8/2021), or if symptoms worsen or fail to improve.     * Surgery not found *      This document has been electronically signed by Henry Caballero PA-C on July 9, 2021 15:12 CDT      Results for orders placed or performed during the hospital encounter of 06/30/21   Tissue Pathology Exam    Specimen: A: Gastric, Antrum; Tissue    B: Gastric, Body; Tissue   Result Value Ref Range    Case Report       Surgical Pathology Report                         Case: [id number]                                  Authorizing Provider:  Alfonzo Wahl MD        Collected:           06/30/2021 01:23 PM          Ordering Location:     [facility]             Received:            07/01/2021 06:58 AM                                  [facility]                                                     Pathologist:           Richard Jackson MD                                                          Specimens:   1) - Gastric, Antrum, antrum  cold bx                                                               2) - Gastric, Body, gastric polyps   cold bx                                               Final Diagnosis       See Scanned Report       Results for orders placed or performed in visit on 06/27/21   COVID-19,  [facility], NP SWAB IN TRANSPORT MEDIA 8-10 HR TAT - Swab, Nasopharynx    Specimen: Nasopharynx; Swab   Result Value Ref Range    COVID19 Not Detected Not Detected - Ref. Range   Results for orders placed or performed during the hospital encounter of 02/09/21   COVID-19,  [facility] IN-HOUSE, NP SWAB IN TRANSPORT MEDIA 8-10 HR TAT - Swab, Anterior nasal    Specimen: Anterior nasal; Swab   Result Value Ref Range    COVID19 Detected (C) Not Detected - Ref. Range   Results for orders placed or performed in visit on 07/28/20   CBC Auto Differential    Specimen: Blood   Result Value Ref Range    WBC 5.19 3.40 - 10.80 10*3/mm3    RBC 5.02 3.77 - 5.28 10*6/mm3    Hemoglobin 15.4 12.0 - 15.9 g/dL    Hematocrit 44.6 34.0 - 46.6 %    MCV 88.8 79.0 - 97.0 fL    MCH 30.7 26.6 - 33.0 pg    MCHC 34.5 31.5 - 35.7 g/dL    RDW 11.8 (L) 12.3 - 15.4 %    RDW-SD 37.5 37.0 - 54.0 fl    MPV 9.9 6.0 - 12.0 fL    Platelets 319 140 - 450 10*3/mm3    Neutrophil % 62.8 42.7 - 76.0 %    Lymphocyte % 25.6 19.6 - 45.3 %    Monocyte % 8.7 5.0 - 12.0 %    Eosinophil % 1.7 0.3 - 6.2 %    Basophil % 0.8 0.0 - 1.5 %    Immature Grans % 0.4 0.0 - 0.5 %    Neutrophils, Absolute 3.26 1.70 - 7.00 10*3/mm3    Lymphocytes, Absolute 1.33 0.70 - 3.10 10*3/mm3    Monocytes, Absolute 0.45 0.10 - 0.90 10*3/mm3    Eosinophils, Absolute 0.09 0.00 - 0.40 10*3/mm3    Basophils, Absolute 0.04 0.00 - 0.20 10*3/mm3    Immature Grans, Absolute 0.02 0.00 - 0.05 10*3/mm3     nRBC 0.0 0.0 - 0.2 /100 WBC   Vitamin D 25 Hydroxy    Specimen: Blood   Result Value Ref Range    25 Hydroxy, Vitamin D 68.7 30.0 - 100.0 ng/ml   TSH    Specimen: Blood   Result Value Ref Range    TSH 0.908 0.270 - 4.200 uIU/mL   Comprehensive Metabolic Panel    Specimen: Blood   Result Value Ref Range    Glucose 93 65 - 99 mg/dL    BUN 15 8 - 23 mg/dL    Creatinine 0.69 0.57 - 1.00 mg/dL    Sodium 144 136 - 145 mmol/L    Potassium 4.3 3.5 - 5.2 mmol/L    Chloride 107 98 - 107 mmol/L    CO2 27.3 22.0 - 29.0 mmol/L    Calcium 9.6 8.6 - 10.5 mg/dL    Total Protein 6.3 6.0 - 8.5 g/dL    Albumin 4.30 3.50 - 5.20 g/dL    ALT (SGPT) 16 1 - 33 U/L    AST (SGOT) 25 1 - 32 U/L    Alkaline Phosphatase 93 39 - 117 U/L    Total Bilirubin 0.7 0.0 - 1.2 mg/dL    eGFR Non African Amer 83 >60 mL/min/1.73    Globulin 2.0 gm/dL    A/G Ratio 2.2 g/dL    BUN/Creatinine Ratio 21.7 7.0 - 25.0    Anion Gap 9.7 5.0 - 15.0 mmol/L   Results for orders placed or performed during the hospital encounter of 01/07/20   Tissue Pathology Exam    Specimen: A: Gastric, Antrum; Tissue    B: Esophagus, Distal; Tissue    C: Stomach; Tissue   Result Value Ref Range    Case Report       Surgical Pathology Report                         Case: [id number]                                  Authorizing Provider:  Alfonzo Wahl MD        Collected:           01/07/2020 04:04 PM          Ordering Location:     [facility]             Received:            01/08/2020 06:56 AM                                 [place] [facility]                                                     Pathologist:           Gurinder Redmond MD                                                           Specimens:   1) - Gastric, Antrum                                                                                2) - Esophagus, Distal                                                                              3) - Stomach, body of stomach polyps                                    "                    Final Diagnosis       1.  GASTRIC ANTRUM, BIOPSY:  REACTIVE GASTROPATHY.    2.  DISTAL ESOPHAGUS, BIOPSY:  CHRONIC ESOPHAGITIS.    3.  GASTRIC CORPUS, POLYPECTOMY:  FUNDIC GLAND POLYPS.      Gross Description       1.  Specimen #1 is labeled \"A\".  Two tan fragments measure 0.7 x 0.3 x 0.2 cm together.  Totally submitted.    2.  Specimen #2 is labeled \"DE\".  Two gray fragments measure 0.8 x 0.6 x 0.1 cm together.  Totally submitted.    3.  Specimen #3 is labeled \"LUCIA P\".  Two tan fragments measure 0.7 x 0.4 x 0.2 cm together.  Totally submitted.     Results for orders placed or performed in visit on 11/06/19   CBC Auto Differential    Specimen: Blood   Result Value Ref Range    WBC 5.34 3.40 - 10.80 10*3/mm3    RBC 5.10 3.77 - 5.28 10*6/mm3    Hemoglobin 15.7 12.0 - 15.9 g/dL    Hematocrit 44.6 34.0 - 46.6 %    MCV 87.5 79.0 - 97.0 fL    MCH 30.8 26.6 - 33.0 pg    MCHC 35.2 31.5 - 35.7 g/dL    RDW 12.2 (L) 12.3 - 15.4 %    RDW-SD 38.9 37.0 - 54.0 fl    MPV 10.5 6.0 - 12.0 fL    Platelets 300 140 - 450 10*3/mm3    Neutrophil % 55.4 42.7 - 76.0 %    Lymphocyte % 33.7 19.6 - 45.3 %    Monocyte % 8.4 5.0 - 12.0 %    Eosinophil % 1.5 0.3 - 6.2 %    Basophil % 0.6 0.0 - 1.5 %    Immature Grans % 0.4 0.0 - 0.5 %    Neutrophils, Absolute 2.96 1.70 - 7.00 10*3/mm3    Lymphocytes, Absolute 1.80 0.70 - 3.10 10*3/mm3    Monocytes, Absolute 0.45 0.10 - 0.90 10*3/mm3    Eosinophils, Absolute 0.08 0.00 - 0.40 10*3/mm3    Basophils, Absolute 0.03 0.00 - 0.20 10*3/mm3    Immature Grans, Absolute 0.02 0.00 - 0.05 10*3/mm3    nRBC 0.0 0.0 - 0.2 /100 WBC   Vitamin D 25 Hydroxy    Specimen: Blood   Result Value Ref Range    25 Hydroxy, Vitamin D 87.8 30.0 - 100.0 ng/ml   TSH    Specimen: Blood   Result Value Ref Range    TSH 1.070 0.270 - 4.200 uIU/mL   Vitamin B12    Specimen: Blood   Result Value Ref Range    Vitamin B-12 302 211 - 946 pg/mL   Comprehensive Metabolic Panel    Specimen: Blood   Result Value Ref Range "    Glucose 92 65 - 99 mg/dL    BUN 14 8 - 23 mg/dL    Creatinine 0.79 0.57 - 1.00 mg/dL    Sodium 142 136 - 145 mmol/L    Potassium 4.4 3.5 - 5.2 mmol/L    Chloride 104 98 - 107 mmol/L    CO2 27.4 22.0 - 29.0 mmol/L    Calcium 10.1 8.6 - 10.5 mg/dL    Total Protein 6.6 6.0 - 8.5 g/dL    Albumin 4.40 3.50 - 5.20 g/dL    ALT (SGPT) 15 1 - 33 U/L    AST (SGOT) 19 1 - 32 U/L    Alkaline Phosphatase 97 39 - 117 U/L    Total Bilirubin 0.5 0.0 - 1.2 mg/dL    eGFR Non African Amer 71 >60 mL/min/1.73    Globulin 2.2 gm/dL    A/G Ratio 2.0 g/dL    BUN/Creatinine Ratio 17.7 7.0 - 25.0    Anion Gap 10.6 5.0 - 15.0 mmol/L   Results for orders placed or performed in visit on 05/06/19   CBC Auto Differential    Specimen: Blood   Result Value Ref Range    WBC 5.56 3.40 - 10.80 10*3/mm3    RBC 5.12 3.77 - 5.28 10*6/mm3    Hemoglobin 15.4 12.0 - 15.9 g/dL    Hematocrit 45.5 34.0 - 46.6 %    MCV 88.9 79.0 - 97.0 fL    MCH 30.1 26.6 - 33.0 pg    MCHC 33.8 31.5 - 35.7 g/dL    RDW 12.7 12.3 - 15.4 %    RDW-SD 41.8 37.0 - 54.0 fl    MPV 10.5 6.0 - 12.0 fL    Platelets 295 140 - 450 10*3/mm3    Neutrophil % 59.7 42.7 - 76.0 %    Lymphocyte % 30.0 19.6 - 45.3 %    Monocyte % 7.7 5.0 - 12.0 %    Eosinophil % 1.8 0.3 - 6.2 %    Basophil % 0.4 0.0 - 1.5 %    Immature Grans % 0.4 0.0 - 0.5 %    Neutrophils, Absolute 3.32 1.70 - 7.00 10*3/mm3    Lymphocytes, Absolute 1.67 0.70 - 3.10 10*3/mm3    Monocytes, Absolute 0.43 0.10 - 0.90 10*3/mm3    Eosinophils, Absolute 0.10 0.00 - 0.40 10*3/mm3    Basophils, Absolute 0.02 0.00 - 0.20 10*3/mm3    Immature Grans, Absolute 0.02 0.00 - 0.05 10*3/mm3    nRBC 0.0 0.0 - 0.2 /100 WBC     *Note: Due to a large number of results and/or encounters for the requested time period, some results have not been displayed. A complete set of results can be found in Results Review.       Some portions of this note have been dictated using voice recognition software and may contain errors and/or omissions.

## 2021-07-19 ENCOUNTER — OFFICE VISIT (OUTPATIENT)
Dept: GASTROENTEROLOGY | Facility: CLINIC | Age: 76
End: 2021-07-19

## 2021-07-19 ENCOUNTER — LAB (OUTPATIENT)
Dept: LAB | Facility: HOSPITAL | Age: 76
End: 2021-07-19

## 2021-07-19 VITALS
DIASTOLIC BLOOD PRESSURE: 77 MMHG | HEART RATE: 81 BPM | HEIGHT: 65 IN | BODY MASS INDEX: 29.69 KG/M2 | WEIGHT: 178.2 LBS | SYSTOLIC BLOOD PRESSURE: 141 MMHG

## 2021-07-19 DIAGNOSIS — R10.10 PAIN OF UPPER ABDOMEN: ICD-10-CM

## 2021-07-19 DIAGNOSIS — K31.7 FUNDIC GLAND POLYPOSIS OF STOMACH: ICD-10-CM

## 2021-07-19 DIAGNOSIS — K44.9 HIATAL HERNIA: ICD-10-CM

## 2021-07-19 DIAGNOSIS — K21.00 GASTROESOPHAGEAL REFLUX DISEASE WITH ESOPHAGITIS WITHOUT HEMORRHAGE: Primary | ICD-10-CM

## 2021-07-19 LAB
25(OH)D3 SERPL-MCNC: 61.1 NG/ML (ref 30–100)
ALBUMIN SERPL-MCNC: 4.2 G/DL (ref 3.5–5.2)
ALBUMIN/GLOB SERPL: 1.8 G/DL
ALP SERPL-CCNC: 101 U/L (ref 39–117)
ALT SERPL W P-5'-P-CCNC: 15 U/L (ref 1–33)
ANION GAP SERPL CALCULATED.3IONS-SCNC: 13.6 MMOL/L (ref 5–15)
AST SERPL-CCNC: 22 U/L (ref 1–32)
BASOPHILS # BLD AUTO: 0.03 10*3/MM3 (ref 0–0.2)
BASOPHILS NFR BLD AUTO: 0.6 % (ref 0–1.5)
BILIRUB SERPL-MCNC: 0.7 MG/DL (ref 0–1.2)
BUN SERPL-MCNC: 17 MG/DL (ref 8–23)
BUN/CREAT SERPL: 19.3 (ref 7–25)
CALCIUM SPEC-SCNC: 9.6 MG/DL (ref 8.6–10.5)
CANCER AG125 SERPL QL: 11.3 U/ML (ref 0–38.1)
CHLORIDE SERPL-SCNC: 105 MMOL/L (ref 98–107)
CHOLEST SERPL-MCNC: 181 MG/DL (ref 0–200)
CO2 SERPL-SCNC: 24.4 MMOL/L (ref 22–29)
CREAT SERPL-MCNC: 0.88 MG/DL (ref 0.57–1)
DEPRECATED RDW RBC AUTO: 39.5 FL (ref 37–54)
EOSINOPHIL # BLD AUTO: 0.06 10*3/MM3 (ref 0–0.4)
EOSINOPHIL NFR BLD AUTO: 1.3 % (ref 0.3–6.2)
ERYTHROCYTE [DISTWIDTH] IN BLOOD BY AUTOMATED COUNT: 12.4 % (ref 12.3–15.4)
GFR SERPL CREATININE-BSD FRML MDRD: 62 ML/MIN/1.73
GLOBULIN UR ELPH-MCNC: 2.3 GM/DL
GLUCOSE SERPL-MCNC: 95 MG/DL (ref 65–99)
HCT VFR BLD AUTO: 45.9 % (ref 34–46.6)
HCV AB SER DONR QL: NORMAL
HDLC SERPL-MCNC: 63 MG/DL (ref 40–60)
HGB BLD-MCNC: 15.9 G/DL (ref 12–15.9)
IMM GRANULOCYTES # BLD AUTO: 0.01 10*3/MM3 (ref 0–0.05)
IMM GRANULOCYTES NFR BLD AUTO: 0.2 % (ref 0–0.5)
LDLC SERPL CALC-MCNC: 102 MG/DL (ref 0–100)
LDLC/HDLC SERPL: 1.6 {RATIO}
LYMPHOCYTES # BLD AUTO: 1.53 10*3/MM3 (ref 0.7–3.1)
LYMPHOCYTES NFR BLD AUTO: 31.9 % (ref 19.6–45.3)
MCH RBC QN AUTO: 30.5 PG (ref 26.6–33)
MCHC RBC AUTO-ENTMCNC: 34.6 G/DL (ref 31.5–35.7)
MCV RBC AUTO: 88.1 FL (ref 79–97)
MONOCYTES # BLD AUTO: 0.42 10*3/MM3 (ref 0.1–0.9)
MONOCYTES NFR BLD AUTO: 8.8 % (ref 5–12)
NEUTROPHILS NFR BLD AUTO: 2.74 10*3/MM3 (ref 1.7–7)
NEUTROPHILS NFR BLD AUTO: 57.2 % (ref 42.7–76)
NRBC BLD AUTO-RTO: 0 /100 WBC (ref 0–0.2)
PLATELET # BLD AUTO: 288 10*3/MM3 (ref 140–450)
PMV BLD AUTO: 10.5 FL (ref 6–12)
POTASSIUM SERPL-SCNC: 4.5 MMOL/L (ref 3.5–5.2)
PROT SERPL-MCNC: 6.5 G/DL (ref 6–8.5)
RBC # BLD AUTO: 5.21 10*6/MM3 (ref 3.77–5.28)
SODIUM SERPL-SCNC: 143 MMOL/L (ref 136–145)
TRIGL SERPL-MCNC: 85 MG/DL (ref 0–150)
TSH SERPL DL<=0.05 MIU/L-ACNC: 0.97 UIU/ML (ref 0.27–4.2)
VLDLC SERPL-MCNC: 16 MG/DL (ref 5–40)
WBC # BLD AUTO: 4.79 10*3/MM3 (ref 3.4–10.8)

## 2021-07-19 PROCEDURE — 80061 LIPID PANEL: CPT | Performed by: INTERNAL MEDICINE

## 2021-07-19 PROCEDURE — 99213 OFFICE O/P EST LOW 20 MIN: CPT | Performed by: PHYSICIAN ASSISTANT

## 2021-07-19 PROCEDURE — 85025 COMPLETE CBC W/AUTO DIFF WBC: CPT | Performed by: INTERNAL MEDICINE

## 2021-07-19 PROCEDURE — 86304 IMMUNOASSAY TUMOR CA 125: CPT | Performed by: INTERNAL MEDICINE

## 2021-07-19 PROCEDURE — 80053 COMPREHEN METABOLIC PANEL: CPT | Performed by: INTERNAL MEDICINE

## 2021-07-19 PROCEDURE — 84443 ASSAY THYROID STIM HORMONE: CPT | Performed by: INTERNAL MEDICINE

## 2021-07-19 PROCEDURE — 82306 VITAMIN D 25 HYDROXY: CPT | Performed by: FAMILY MEDICINE

## 2021-07-19 PROCEDURE — 86803 HEPATITIS C AB TEST: CPT | Performed by: FAMILY MEDICINE

## 2021-07-19 PROCEDURE — 36415 COLL VENOUS BLD VENIPUNCTURE: CPT | Performed by: FAMILY MEDICINE

## 2021-07-19 RX ORDER — FAMOTIDINE 40 MG/1
40 TABLET, FILM COATED ORAL 2 TIMES DAILY
Qty: 60 TABLET | Refills: 3 | Status: SHIPPED | OUTPATIENT
Start: 2021-07-19 | End: 2021-08-19

## 2021-07-19 NOTE — PATIENT INSTRUCTIONS
MyPlate from USDA    MyPlate is an outline of a general healthy diet based on the 2010 Dietary Guidelines for Americans, from the U.S. Department of Agriculture (USDA). It sets guidelines for how much food you should eat from each food group based on your age, sex, and level of physical activity.  What are tips for following MyPlate?  To follow MyPlate recommendations:  · Eat a wide variety of fruits and vegetables, grains, and protein foods.  · Serve smaller portions and eat less food throughout the day.  · Limit portion sizes to avoid overeating.  · Enjoy your food.  · Get at least 150 minutes of exercise every week. This is about 30 minutes each day, 5 or more days per week.  It can be difficult to have every meal look like MyPlate. Think about MyPlate as eating guidelines for an entire day, rather than each individual meal.  Fruits and vegetables  · Make half of your plate fruits and vegetables.  · Eat many different colors of fruits and vegetables each day.  · For a 2,000 calorie daily food plan, eat:  ? 2½ cups of vegetables every day.  ? 2 cups of fruit every day.  · 1 cup is equal to:  ? 1 cup raw or cooked vegetables.  ? 1 cup raw fruit.  ? 1 medium-sized orange, apple, or banana.  ? 1 cup 100% fruit or vegetable juice.  ? 2 cups raw leafy greens, such as lettuce, spinach, or kale.  ? ½ cup dried fruit.  Grains  · One fourth of your plate should be grains.  · Make at least half of the grains you eat each day whole grains.  · For a 2,000 calorie daily food plan, eat 6 oz of grains every day.  · 1 oz is equal to:  ? 1 slice bread.  ? 1 cup cereal.  ? ½ cup cooked rice, cereal, or pasta.  Protein  · One fourth of your plate should be protein.  · Eat a wide variety of protein foods, including meat, poultry, fish, eggs, beans, nuts, and tofu.  · For a 2,000 calorie daily food plan, eat 5½ oz of protein every day.  · 1 oz is equal to:  ? 1 oz meat, poultry, or fish.  ? ¼ cup cooked beans.  ? 1 egg.  ? ½ oz nuts  or seeds.  ? 1 Tbsp peanut butter.  Dairy  · Drink fat-free or low-fat (1%) milk.  · Eat or drink dairy as a side to meals.  · For a 2,000 calorie daily food plan, eat or drink 3 cups of dairy every day.  · 1 cup is equal to:  ? 1 cup milk, yogurt, cottage cheese, or soy milk (soy beverage).  ? 2 oz processed cheese.  ? 1½ oz natural cheese.  Fats, oils, salt, and sugars  · Only small amounts of oils are recommended.  · Avoid foods that are high in calories and low in nutritional value (empty calories), like foods high in fat or added sugars.  · Choose foods that are low in salt (sodium). Choose foods that have less than 140 milligrams (mg) of sodium per serving.  · Drink water instead of sugary drinks. Drink enough water each day to keep your urine pale yellow.  Where to find support  · Work with your health care provider or a nutrition specialist (dietitian) to develop a customized eating plan that is right for you.  · Download an jey (mobile application) to help you track your daily food intake.  Where to find more information  · Go to ChooseMyPlate.gov for more information.  Summary  · MyPlate is a general guideline for healthy eating from the USDA. It is based on the 2010 Dietary Guidelines for Americans.  · In general, fruits and vegetables should take up ½ of your plate, grains should take up ¼ of your plate, and protein should take up ¼ of your plate.  This information is not intended to replace advice given to you by your health care provider. Make sure you discuss any questions you have with your health care provider.  Document Revised: 05/21/2020 Document Reviewed: 03/19/2018  Elsevier Patient Education © 2021 Elsevier Inc.  BMI for Adults  What is BMI?  Body mass index (BMI) is a number that is calculated from a person's weight and height. BMI can help estimate how much of a person's weight is composed of fat. BMI does not measure body fat directly. Rather, it is an alternative to procedures that  "directly measure body fat, which can be difficult and expensive.  BMI can help identify people who may be at higher risk for certain medical problems.  What are BMI measurements used for?  BMI is used as a screening tool to identify possible weight problems. It helps determine whether a person is obese, overweight, a healthy weight, or underweight.  BMI is useful for:  · Identifying a weight problem that may be related to a medical condition or may increase the risk for medical problems.  · Promoting changes, such as changes in diet and exercise, to help reach a healthy weight. BMI screening can be repeated to see if these changes are working.  How is BMI calculated?  BMI involves measuring your weight in relation to your height. Both height and weight are measured, and the BMI is calculated from those numbers. This can be done either in English (U[place] or metric measurements. Note that charts and online BMI calculators are available to help you find your BMI quickly and easily without having to do these calculations yourself.  To calculate your BMI in English (U.S[place] measurements:    1. Measure your weight in pounds (lb).  2. Multiply the number of pounds by 703.  ? For example, for a person who weighs 180 lb, multiply that number by 703, which equals 126,540.  3. Measure your height in inches. Then multiply that number by itself to get a measurement called \"inches squared.\"  ? For example, for a person who is 70 inches tall, the \"inches squared\" measurement is 70 inches x 70 inches, which equals 4,900 inches squared.  4. Divide the total from step 2 (number of lb x 703) by the total from step 3 (inches squared): 126,540 ÷ 4,900 = 25.8. This is your BMI.  To calculate your BMI in metric measurements:  1. Measure your weight in kilograms (kg).  2. Measure your height in meters (m). Then multiply that number by itself to get a measurement called \"meters squared.\"  ? For example, for a person who is 1.75 m tall, the " "\"meters squared\" measurement is 1.75 m x 1.75 m, which is equal to 3.1 meters squared.  3. Divide the number of kilograms (your weight) by the meters squared number. In this example: 70 ÷ 3.1 = 22.6. This is your BMI.  What do the results mean?  BMI charts are used to identify whether you are underweight, normal weight, overweight, or obese. The following guidelines will be used:  · Underweight: BMI less than 18.5.  · Normal weight: BMI between 18.5 and 24.9.  · Overweight: BMI between 25 and 29.9.  · Obese: BMI of 30 or above.  Keep these notes in mind:  · Weight includes both fat and muscle, so someone with a muscular build, such as an athlete, may have a BMI that is higher than 24.9. In cases like these, BMI is not an accurate measure of body fat.  · To determine if excess body fat is the cause of a BMI of 25 or higher, further assessments may need to be done by a health care provider.  · BMI is usually interpreted in the same way for men and women.  Where to find more information  For more information about BMI, including tools to quickly calculate your BMI, go to these websites:  · Centers for Disease Control and Prevention: www.cdc.gov  · American Heart Association: www.heart.org  · National Heart, Lung, and Blood [place]: www.nhlbi.nih.gov  Summary  · Body mass index (BMI) is a number that is calculated from a person's weight and height.  · BMI may help estimate how much of a person's weight is composed of fat. BMI can help identify those who may be at higher risk for certain medical problems.  · BMI can be measured using English measurements or metric measurements.  · BMI charts are used to identify whether you are underweight, normal weight, overweight, or obese.  This information is not intended to replace advice given to you by your health care provider. Make sure you discuss any questions you have with your health care provider.  Document Revised: 09/09/2020 Document Reviewed: 07/17/2020  Gretel " Patient Education © 2021 Elsevier Inc.

## 2021-07-19 NOTE — PROGRESS NOTES
Chief Complaint   Patient presents with   • Heartburn       ENDO PROCEDURE ORDERED:    Subjective    Akiko Blackwell is a 75 y.o. female. she is here today for follow-up.    History of Present Illness    This is a patient seen on a recheck of her gastroesophageal reflux disease, fundic gland polyposis, abdominal pain. Last seen 07/08/2021. I had suggested a trial on Pamelor for her discomfort. She read the side effects and really does not want to take that. She does not think she needs anything as an antidepressant. I told her I was using it more as an anticholinergic. Nevertheless, she is still having a lot of belching, burning, gas, and bloating. She does not think the Dexilant every other day is working very well for her. She tends to have worse symptoms at night and when she first gets up. She just started probiotics. She is using Align, she has only taken it for a few days but feels like it may be helping her. Her weight is down 1 pound since last visit. Last EGD/colonoscopy 06/30/2021 showed a significant gastritis with pretty severe pedunculated polyposis, diverticulosis, and hemorrhoids.     ASSESSMENT/PLAN: Patient with significant gastroesophageal reflux disease and polyposis of the stomach. I discussed trying to eliminate her PPIs to see if this reduces the hypertrophy of her polyps in her stomach. After a long discussion I suggested we stop the Dexilant, give her a trial of Pepcid 40 mg, she may take that twice a day but will try taking that once a day. Since it has a fairly rapid onset, she may take that twice a day as needed. I suggested that she could still take the Dexilant as a rescue medication if absolutely necessary, but we will try to avoid that. I also recommended she continue on the Align probiotic. We will plan to keep her regularly scheduled follow-up in a few months. She is asked to contact our office if she has any interim problems or difficulties. If she is not able to tolerate  stopping the Dexilant we will try to evaluate for other options. Patient was agreeable.       The following portions of the patient's history were reviewed and updated as appropriate:   Past Medical History:   Diagnosis Date   • Acute bronchitis, unspecified    • Acute laryngitis    • Allergic rhinitis    • Atrophic vaginitis    • Breast cyst    • Diverticular disease of colon    • Encounter for gynecological examination (general) (routine) without abnormal findings    • Encounter for screening for malignant neoplasm of colon    • Encounter for screening for osteoporosis    • Esophagitis    • Family history of malignant neoplasm of gastrointestinal tract    • GERD (gastroesophageal reflux disease)    • History of bone density study 04/29/2016    DXA BONE DENSITY AXIAL [address] [facility] (2)    • History of screening mammography 01/02/2016   • Multiple gastric polyps 01/07/2020   • Osteopenia    • Pain in right knee    • Subclinical hypothyroidism    • Thyroid nodule    • Viral upper respiratory tract infection    • Vitamin D deficiency      Past Surgical History:   Procedure Laterality Date   • BREAST BIOPSY      BX BREAST PERCUT W/O IMAGE 81724 (1)   x2   • CERVIX SURGERY  05/15/1973    Conization biopsy of cervix. Cervical cautery.   • COLONOSCOPY  06/20/2016    Diverticulosis in the sigmoid colon and in the descending colon.External and internal hemorrhoids.No specimens collected.   • COLONOSCOPY N/A 6/30/2021    Procedure: COLONOSCOPY;  Surgeon: Alfonzo Wahl MD;  Location: [facility];  Service: Gastroenterology;  Laterality: N/A;   • DILATATION AND CURETTAGE  10/10/1990    Fractional   • ENDOSCOPY N/A 1/7/2020    Procedure: ESOPHAGOGASTRODUODENOSCOPY WITH DILATATION--possible;  Surgeon: Alfonzo Wahl MD;  Location: [facility] END[facility];  Service: Gastroenterology   • ENDOSCOPY N/A 6/30/2021    Procedure: ESOPHAGOGASTRODUODENOSCOPY;  Surgeon: Alfonzo Wahl MD;  Location: [facility] ENDOSCOPY;  Service:  Gastroenterology;  Laterality: N/A;   • ENDOSCOPY W/ PEG TUBE PLACEMENT  2012    Esophagitis seen. Biopsy taken. Gastritis in stomach. Biopsy taken. Hiatus hernia in stomach. Normal duodenum. Biopsy taken.   • EXCISION LESION  1967    Excision of inclusion cyst, vagina.   • INJECTION OF MEDICATION  2016    Kenalog (4)      • PAP SMEAR  2010    Mild inflammation present. Negative   • TONSILLECTOMY  01/15/1964    Recurring acute tonsillitis.   • UPPER GASTROINTESTINAL ENDOSCOPY  2012   • UPPER GASTROINTESTINAL ENDOSCOPY  2020   • UPPER GASTROINTESTINAL ENDOSCOPY  2021     Family History   Problem Relation Age of Onset   • Ovarian cancer Mother         Onset age 70-74 years.   • Cancer Brother         Colorectal Cancer, onset age 70-74 years.   • Colon cancer Brother    • Diabetes Other      OB History        2    Para   2    Term   2            AB        Living           SAB        TAB        Ectopic        Molar        Multiple        Live Births                  No Known Allergies  Social History     Socioeconomic History   • Marital status:      Spouse name: Not on file   • Number of children: Not on file   • Years of education: Not on file   • Highest education level: Not on file   Tobacco Use   • Smoking status: Former Smoker   • Smokeless tobacco: Never Used   Vaping Use   • Vaping Use: Never used   Substance and Sexual Activity   • Alcohol use: No   • Drug use: No   • Sexual activity: Defer     Current Medications:  Prior to Admission medications    Medication Sig Start Date End Date Taking? Authorizing Provider   calcium carbonate (TUMS ULTRA) 1000 MG chewable tablet Chew 1 tablet Daily.   Yes ProviderElmo MD   Cholecalciferol (Vitamin D3) 1.25 MG (25942 UT) capsule Take 1 capsule by mouth Every 7 (Seven) Days. 21  Yes Maximino Marina MD   dexlansoprazole (Dexilant) 60 MG capsule Take 1 capsule by mouth Daily.  Patient  "taking differently: Take 1 capsule by mouth Every Other Day. 5/27/21  Yes Henry Caballero PA-C   levothyroxine (SYNTHROID, LEVOTHROID) 88 MCG tablet Take 1 tab daily Monday to Saturday and 1.5 tabs on Sunday 1/25/21  Yes Maximino Marina MD   Probiotic Product (ALIGN PO) Take  by mouth Daily.   Yes Provider, MD Elmo   nortriptyline (Pamelor) 10 MG capsule Take 1 capsule by mouth Every Night. 7/8/21 7/19/21 Yes Henry Caballero PA-C     Review of Systems  Review of Systems       Objective    /77   Pulse 81   Ht 165.1 cm (65\")   Wt 80.8 kg (178 lb 3.2 oz)   LMP  (LMP Unknown)   BMI 29.65 kg/m²   Physical Exam  Vitals and nursing note reviewed.   Constitutional:       General: She is not in acute distress.     Appearance: She is well-developed.   HENT:      Head: Normocephalic and atraumatic.   Eyes:      Pupils: Pupils are equal, round, and reactive to light.   Cardiovascular:      Rate and Rhythm: Normal rate and regular rhythm.      Heart sounds: Normal heart sounds.   Pulmonary:      Effort: Pulmonary effort is normal.      Breath sounds: Normal breath sounds.   Abdominal:      General: Bowel sounds are normal. There is no distension or abdominal bruit.      Palpations: Abdomen is soft. Abdomen is not rigid. There is no shifting dullness or mass.      Tenderness: There is abdominal tenderness. There is no guarding or rebound.      Hernia: No hernia is present. There is no hernia in the ventral area.   Musculoskeletal:         General: Normal range of motion.      Cervical back: Normal range of motion.   Skin:     General: Skin is warm and dry.   Neurological:      Mental Status: She is alert and oriented to person, place, and time.   Psychiatric:         Behavior: Behavior normal.         Thought Content: Thought content normal.         Judgment: Judgment normal.       Assessment/Plan      1. Gastroesophageal reflux disease with esophagitis without hemorrhage    2. Fundic gland " polyposis of stomach    3. Hiatal hernia    4. Pain of upper abdomen    .   Diagnoses and all orders for this visit:    1. Gastroesophageal reflux disease with esophagitis without hemorrhage (Primary)    2. Fundic gland polyposis of stomach    3. Hiatal hernia    4. Pain of upper abdomen    Other orders  -     famotidine (Pepcid) 40 MG tablet; Take 1 tablet by mouth 2 (Two) Times a Day.  Dispense: 60 tablet; Refill: 3        Orders placed during this encounter include:  No orders of the defined types were placed in this encounter.      Medications prescribed:  New Medications Ordered This Visit   Medications   • famotidine (Pepcid) 40 MG tablet     Sig: Take 1 tablet by mouth 2 (Two) Times a Day.     Dispense:  60 tablet     Refill:  3     Discontinued Medications       Reason for Discontinue     nortriptyline (Pamelor) 10 MG capsule    Non-compliance        Requested Prescriptions     Signed Prescriptions Disp Refills   • famotidine (Pepcid) 40 MG tablet 60 tablet 3     Sig: Take 1 tablet by mouth 2 (Two) Times a Day.       Review and/or summary of lab tests, radiology, procedures, medications. Review and summary of old records and obtaining of history. The risks and benefits of my recommendations, as well as other treatment options were discussed with the patient today. Questions were answered.    Follow-up: Return if symptoms worsen or fail to improve, for Next scheduled follow up.     * Surgery not found *      This document has been electronically signed by Henry Caballero PA-C on July 21, 2021 18:52 CDT      Results for orders placed or performed during the hospital encounter of 06/30/21   Tissue Pathology Exam    Specimen: A: Gastric, Antrum; Tissue    B: Gastric, Body; Tissue   Result Value Ref Range    Case Report       Surgical Pathology Report                         Case: [id number]                                  Authorizing Provider:  Alfonzo Wahl MD        Collected:           06/30/2021 01:23  PM          Ordering Location:     [facility]             Received:            07/01/2021 06:58 AM                                 [facility]                                                     Pathologist:           Richard Jackson MD                                                          Specimens:   1) - Gastric, Antrum, antrum  cold bx                                                               2) - Gastric, Body, gastric polyps   cold bx                                               Final Diagnosis       See Scanned Report       Results for orders placed or performed in visit on 06/27/21   COVID-19, [facility] IN-HOUSE, NP SWAB IN TRANSPORT MEDIA 8-10 HR TAT - Swab, Nasopharynx    Specimen: Nasopharynx; Swab   Result Value Ref Range    COVID19 Not Detected Not Detected - Ref. Range   Results for orders placed or performed during the hospital encounter of 02/09/21   COVID-19, [facility] IN-HOUSE, NP SWAB IN TRANSPORT MEDIA 8-10 HR TAT - Swab, Anterior nasal    Specimen: Anterior nasal; Swab   Result Value Ref Range    COVID19 Detected (C) Not Detected - Ref. Range   Results for orders placed or performed in visit on 01/25/21   CBC Auto Differential    Specimen: Blood   Result Value Ref Range    WBC 4.79 3.40 - 10.80 10*3/mm3    RBC 5.21 3.77 - 5.28 10*6/mm3    Hemoglobin 15.9 12.0 - 15.9 g/dL    Hematocrit 45.9 34.0 - 46.6 %    MCV 88.1 79.0 - 97.0 fL    MCH 30.5 26.6 - 33.0 pg    MCHC 34.6 31.5 - 35.7 g/dL    RDW 12.4 12.3 - 15.4 %    RDW-SD 39.5 37.0 - 54.0 fl    MPV 10.5 6.0 - 12.0 fL    Platelets 288 140 - 450 10*3/mm3    Neutrophil % 57.2 42.7 - 76.0 %    Lymphocyte % 31.9 19.6 - 45.3 %    Monocyte % 8.8 5.0 - 12.0 %    Eosinophil % 1.3 0.3 - 6.2 %    Basophil % 0.6 0.0 - 1.5 %    Immature Grans % 0.2 0.0 - 0.5 %    Neutrophils, Absolute 2.74 1.70 - 7.00 10*3/mm3    Lymphocytes, Absolute 1.53 0.70 - 3.10 10*3/mm3    Monocytes, Absolute 0.42 0.10 - 0.90 10*3/mm3    Eosinophils, Absolute  0.06 0.00 - 0.40 10*3/mm3    Basophils, Absolute 0.03 0.00 - 0.20 10*3/mm3    Immature Grans, Absolute 0.01 0.00 - 0.05 10*3/mm3    nRBC 0.0 0.0 - 0.2 /100 WBC   Vitamin D 25 Hydroxy    Specimen: Blood   Result Value Ref Range    25 Hydroxy, Vitamin D 61.1 30.0 - 100.0 ng/ml       Specimen: Blood   Result Value Ref Range     11.3 0.0 - 38.1 U/mL   TSH    Specimen: Blood   Result Value Ref Range    TSH 0.970 0.270 - 4.200 uIU/mL   Lipid Panel    Specimen: Blood   Result Value Ref Range    Total Cholesterol 181 0 - 200 mg/dL    Triglycerides 85 0 - 150 mg/dL    HDL Cholesterol 63 (H) 40 - 60 mg/dL    LDL Cholesterol  102 (H) 0 - 100 mg/dL    VLDL Cholesterol 16 5 - 40 mg/dL    LDL/HDL Ratio 1.60    Comprehensive Metabolic Panel    Specimen: Blood   Result Value Ref Range    Glucose 95 65 - 99 mg/dL    BUN 17 8 - 23 mg/dL    Creatinine 0.88 0.57 - 1.00 mg/dL    Sodium 143 136 - 145 mmol/L    Potassium 4.5 3.5 - 5.2 mmol/L    Chloride 105 98 - 107 mmol/L    CO2 24.4 22.0 - 29.0 mmol/L    Calcium 9.6 8.6 - 10.5 mg/dL    Total Protein 6.5 6.0 - 8.5 g/dL    Albumin 4.20 3.50 - 5.20 g/dL    ALT (SGPT) 15 1 - 33 U/L    AST (SGOT) 22 1 - 32 U/L    Alkaline Phosphatase 101 39 - 117 U/L    Total Bilirubin 0.7 0.0 - 1.2 mg/dL    eGFR Non African Amer 62 >60 mL/min/1.73    Globulin 2.3 gm/dL    A/G Ratio 1.8 g/dL    BUN/Creatinine Ratio 19.3 7.0 - 25.0    Anion Gap 13.6 5.0 - 15.0 mmol/L   Results for orders placed or performed in visit on 01/25/21   Hepatitis C Antibody    Specimen: Blood   Result Value Ref Range    Hepatitis C Ab Non-Reactive Non-Reactive   Results for orders placed or performed in visit on 07/28/20   CBC Auto Differential    Specimen: Blood   Result Value Ref Range    WBC 5.19 3.40 - 10.80 10*3/mm3    RBC 5.02 3.77 - 5.28 10*6/mm3    Hemoglobin 15.4 12.0 - 15.9 g/dL    Hematocrit 44.6 34.0 - 46.6 %    MCV 88.8 79.0 - 97.0 fL    MCH 30.7 26.6 - 33.0 pg    MCHC 34.5 31.5 - 35.7 g/dL    RDW 11.8 (L)  12.3 - 15.4 %    RDW-SD 37.5 37.0 - 54.0 fl    MPV 9.9 6.0 - 12.0 fL    Platelets 319 140 - 450 10*3/mm3    Neutrophil % 62.8 42.7 - 76.0 %    Lymphocyte % 25.6 19.6 - 45.3 %    Monocyte % 8.7 5.0 - 12.0 %    Eosinophil % 1.7 0.3 - 6.2 %    Basophil % 0.8 0.0 - 1.5 %    Immature Grans % 0.4 0.0 - 0.5 %    Neutrophils, Absolute 3.26 1.70 - 7.00 10*3/mm3    Lymphocytes, Absolute 1.33 0.70 - 3.10 10*3/mm3    Monocytes, Absolute 0.45 0.10 - 0.90 10*3/mm3    Eosinophils, Absolute 0.09 0.00 - 0.40 10*3/mm3    Basophils, Absolute 0.04 0.00 - 0.20 10*3/mm3    Immature Grans, Absolute 0.02 0.00 - 0.05 10*3/mm3    nRBC 0.0 0.0 - 0.2 /100 WBC   Vitamin D 25 Hydroxy    Specimen: Blood   Result Value Ref Range    25 Hydroxy, Vitamin D 68.7 30.0 - 100.0 ng/ml   TSH    Specimen: Blood   Result Value Ref Range    TSH 0.908 0.270 - 4.200 uIU/mL   Comprehensive Metabolic Panel    Specimen: Blood   Result Value Ref Range    Glucose 93 65 - 99 mg/dL    BUN 15 8 - 23 mg/dL    Creatinine 0.69 0.57 - 1.00 mg/dL    Sodium 144 136 - 145 mmol/L    Potassium 4.3 3.5 - 5.2 mmol/L    Chloride 107 98 - 107 mmol/L    CO2 27.3 22.0 - 29.0 mmol/L    Calcium 9.6 8.6 - 10.5 mg/dL    Total Protein 6.3 6.0 - 8.5 g/dL    Albumin 4.30 3.50 - 5.20 g/dL    ALT (SGPT) 16 1 - 33 U/L    AST (SGOT) 25 1 - 32 U/L    Alkaline Phosphatase 93 39 - 117 U/L    Total Bilirubin 0.7 0.0 - 1.2 mg/dL    eGFR Non African Amer 83 >60 mL/min/1.73    Globulin 2.0 gm/dL    A/G Ratio 2.2 g/dL    BUN/Creatinine Ratio 21.7 7.0 - 25.0    Anion Gap 9.7 5.0 - 15.0 mmol/L     *Note: Due to a large number of results and/or encounters for the requested time period, some results have not been displayed. A complete set of results can be found in Results Review.       Some portions of this note have been dictated using voice recognition software and may contain errors and/or omissions.

## 2021-07-26 ENCOUNTER — OFFICE VISIT (OUTPATIENT)
Dept: ENDOCRINOLOGY | Facility: CLINIC | Age: 76
End: 2021-07-26

## 2021-07-26 VITALS
HEART RATE: 73 BPM | SYSTOLIC BLOOD PRESSURE: 120 MMHG | DIASTOLIC BLOOD PRESSURE: 70 MMHG | HEIGHT: 66 IN | WEIGHT: 178 LBS | BODY MASS INDEX: 28.61 KG/M2 | OXYGEN SATURATION: 96 %

## 2021-07-26 DIAGNOSIS — E53.8 B12 DEFICIENCY: ICD-10-CM

## 2021-07-26 DIAGNOSIS — E03.8 HYPOTHYROIDISM DUE TO HASHIMOTO'S THYROIDITIS: Primary | ICD-10-CM

## 2021-07-26 DIAGNOSIS — I65.21 ATHEROSCLEROSIS OF RIGHT CAROTID ARTERY: ICD-10-CM

## 2021-07-26 DIAGNOSIS — E06.3 HYPOTHYROIDISM DUE TO HASHIMOTO'S THYROIDITIS: Primary | ICD-10-CM

## 2021-07-26 DIAGNOSIS — M81.0 OSTEOPOROSIS, UNSPECIFIED OSTEOPOROSIS TYPE, UNSPECIFIED PATHOLOGICAL FRACTURE PRESENCE: ICD-10-CM

## 2021-07-26 DIAGNOSIS — E55.9 VITAMIN D DEFICIENCY: ICD-10-CM

## 2021-07-26 PROCEDURE — 99214 OFFICE O/P EST MOD 30 MIN: CPT | Performed by: INTERNAL MEDICINE

## 2021-07-26 RX ORDER — LEVOTHYROXINE SODIUM 88 UG/1
TABLET ORAL
Qty: 96 TABLET | Refills: 3 | Status: SHIPPED | OUTPATIENT
Start: 2021-07-26 | End: 2022-01-27 | Stop reason: SDUPTHER

## 2021-07-26 NOTE — PROGRESS NOTES
"Chief Complaint  Hypothyroidism          History of Present Illness       reason - hypothyroidism     duration - March 2013     alleviating factors - levothyroxine     symptoms - hair falling and brittle nails - improved     --     thyroid nodule  personal interpretation , pseudonodule     Feb 2015 compared to Nov 2016   my interpretation - pseudonodules   radiologist interpreation - slight increase in size from 9 mm to 1 cm of largest nodule                ---     Ostepenia    On vit D and calcium      h o  jaw fracture,      Her only present problem is GERD      Objective   Vital Signs:   /70   Pulse 73   Ht 167.6 cm (66\")   Wt 80.7 kg (178 lb)   SpO2 96%   BMI 28.73 kg/m²     Physical Exam  Constitutional:       Appearance: Normal appearance.   HENT:      Head: Normocephalic.   Cardiovascular:      Rate and Rhythm: Normal rate and regular rhythm.   Pulmonary:      Effort: Pulmonary effort is normal.      Breath sounds: Normal breath sounds.   Musculoskeletal:      Cervical back: Normal range of motion and neck supple.      Right lower leg: No edema.      Left lower leg: No edema.   Neurological:      Mental Status: She is alert.      mild  RLE    Result Review :   The following data was reviewed by: Maximino Paige MD on 01/25/2021:  Common labs    Common Labsle 1/18/21 1/18/21 7/19/21 7/19/21 7/19/21    0830 0830 0812 0812 0812   Glucose  93 95     BUN  15 17     Creatinine  0.69 0.88     eGFR Non African Am  83 62     Sodium  144 143     Potassium  4.3 4.5     Chloride  107 105     Calcium  9.6 9.6     Albumin  4.30 4.20     Total Bilirubin  0.7 0.7     Alkaline Phosphatase  93 101     AST (SGOT)  25 22     ALT (SGPT)  16 15     WBC 5.19    4.79   Hemoglobin 15.4    15.9   Hematocrit 44.6    45.9   Platelets 319    288   Total Cholesterol    181    Triglycerides    85    HDL Cholesterol    63 (A)    LDL Cholesterol     102 (A)    (A) Abnormal value              Thyroid Workup    Lab " Results   Component Value Date    TSH 0.970 07/19/2021    TSH 0.908 01/18/2021       Lab Results   Component Value Date    FREET4 1.23 04/15/2015    FREET4 1.48 02/26/2015       No results found for: T3FREE    TPO antibodies    Lab Results   Component Value Date    THYROIDAB 25.1 (H) 02/04/2014       TSI antibodies    No results found for: THYRSTIMIMMU    --------------------------    Lab Results   Component Value Date    CNUFPVMM55 302 07/21/2020       ---------------------------    Lab Results   Component Value Date    NDOO40LN 61.1 07/19/2021    LWOM87FW 68.7 01/18/2021                              Assessment and Plan    Problem List Items Addressed This Visit     None                  Hypothyroidism     on Levothyroxine 88 mcgs , 6.5 tabs per week - one hour before supper or at bedtime     Please go back to daily - increase to 7.5     Also on nexium - now dexilant ( changed to pepcid ) and ca + vit D - in a.m.                  ---     Osteopenia    COMPARISON: DEXA 5/30/2018     FINDINGS:      PA Spine L1-L4         Density: 0.834 g/cm2  T-score: -1.9   Z-score: 0.5     2.3% decrease from most recent prior.     Mean Bilateral Femoral Neck            Density: 0.753 g/cm2  T-score: -0.9   Z-score: 1.3           on 600 D + 400 Calcium     add extra 1000 u daily - stop      Add 50 th u every 2 weeks - every week     Last bone density May 2020      ---     Pseudonodules last US from June 2015 compared to Nov 2016      Repeat and stable , repeat only if clinically there is a palpable nodule      --     on nexium - now on dexilant - now pepcid   she rightufully requests for Mg assessment and nl      --     Sec polycythemia     reasses     If persistent do sleep study - resolved           Right carotid bruit, carotid US done ,    IMPRESSION:  1. There is 50-70% stenosis in the distal right ICA by peak  velocity criteria without significant plaque appreciated. This  may be due to tortuosity.      2. No significant  atherosclerotic plaque       Reassess cholesterol   At goal     Mild RLE , use compression stockings     I reviewed and summarized records from [facility], Barbie Oliveros MD from present year  and I reviewed / ordered labs.      No orders of the defined types were placed in this encounter.         Follow Up   No follow-ups on file.  Patient was given instructions and counseling regarding her condition or for health maintenance advice. Please see specific information pulled into the AVS if appropriate.

## 2021-08-04 NOTE — TELEPHONE ENCOUNTER
Medication refill from [facility] for Nortriptyline has been denied. Patient chose not to take that medication.

## 2021-08-19 ENCOUNTER — TELEPHONE (OUTPATIENT)
Dept: GASTROENTEROLOGY | Facility: CLINIC | Age: 76
End: 2021-08-19

## 2021-08-19 RX ORDER — OMEPRAZOLE 40 MG/1
40 CAPSULE, DELAYED RELEASE ORAL DAILY
Qty: 30 CAPSULE | Refills: 0 | Status: SHIPPED | OUTPATIENT
Start: 2021-08-19 | End: 2021-09-13 | Stop reason: SDUPTHER

## 2021-08-19 NOTE — TELEPHONE ENCOUNTER
Patient has been contacted and made aware that an Rx for Omeprazole 40 mg daily has been sent to [facility]. Patient voiced understanding.

## 2021-08-19 NOTE — TELEPHONE ENCOUNTER
----- Message from Henry Caballero PA-C sent at 8/18/2021  5:15 PM CDT -----  Ok, 40mg please  ----- Message -----  From: Lula Ayers MA  Sent: 8/18/2021   4:40 PM CDT  To: Henry Caballero PA-C    Patient states that the Pepcid is not helping. Patient would like for you to prescribe Omeprazole for her if possible. [facility].

## 2021-09-13 RX ORDER — OMEPRAZOLE 40 MG/1
40 CAPSULE, DELAYED RELEASE ORAL DAILY
Qty: 30 CAPSULE | Refills: 1 | Status: SHIPPED | OUTPATIENT
Start: 2021-09-13 | End: 2021-11-12

## 2021-10-11 ENCOUNTER — OFFICE VISIT (OUTPATIENT)
Dept: GASTROENTEROLOGY | Facility: CLINIC | Age: 76
End: 2021-10-11

## 2021-10-11 VITALS
HEART RATE: 81 BPM | SYSTOLIC BLOOD PRESSURE: 139 MMHG | DIASTOLIC BLOOD PRESSURE: 76 MMHG | BODY MASS INDEX: 28.54 KG/M2 | HEIGHT: 66 IN | WEIGHT: 177.6 LBS

## 2021-10-11 DIAGNOSIS — K44.9 HIATAL HERNIA: ICD-10-CM

## 2021-10-11 DIAGNOSIS — K31.7 FUNDIC GLAND POLYPOSIS OF STOMACH: ICD-10-CM

## 2021-10-11 DIAGNOSIS — K21.00 GASTROESOPHAGEAL REFLUX DISEASE WITH ESOPHAGITIS WITHOUT HEMORRHAGE: Primary | ICD-10-CM

## 2021-10-11 DIAGNOSIS — R10.10 PAIN OF UPPER ABDOMEN: ICD-10-CM

## 2021-10-11 PROCEDURE — 99213 OFFICE O/P EST LOW 20 MIN: CPT | Performed by: PHYSICIAN ASSISTANT

## 2021-11-11 ENCOUNTER — TELEPHONE (OUTPATIENT)
Dept: GASTROENTEROLOGY | Facility: CLINIC | Age: 76
End: 2021-11-11

## 2021-11-12 RX ORDER — OMEPRAZOLE 40 MG/1
CAPSULE, DELAYED RELEASE ORAL
Qty: 30 CAPSULE | Refills: 2 | Status: SHIPPED | OUTPATIENT
Start: 2021-11-12 | End: 2022-01-21 | Stop reason: SDUPTHER

## 2022-01-19 ENCOUNTER — LAB (OUTPATIENT)
Dept: LAB | Facility: HOSPITAL | Age: 77
End: 2022-01-19

## 2022-01-19 LAB
25(OH)D3 SERPL-MCNC: 77 NG/ML (ref 30–100)
ALBUMIN SERPL-MCNC: 4.3 G/DL (ref 3.5–5.2)
ALBUMIN/GLOB SERPL: 2.2 G/DL
ALP SERPL-CCNC: 115 U/L (ref 39–117)
ALT SERPL W P-5'-P-CCNC: 14 U/L (ref 1–33)
ANION GAP SERPL CALCULATED.3IONS-SCNC: 12.2 MMOL/L (ref 5–15)
AST SERPL-CCNC: 18 U/L (ref 1–32)
BASOPHILS # BLD AUTO: 0.03 10*3/MM3 (ref 0–0.2)
BASOPHILS NFR BLD AUTO: 0.6 % (ref 0–1.5)
BILIRUB SERPL-MCNC: 0.5 MG/DL (ref 0–1.2)
BUN SERPL-MCNC: 13 MG/DL (ref 8–23)
BUN/CREAT SERPL: 15.5 (ref 7–25)
CALCIUM SPEC-SCNC: 9.6 MG/DL (ref 8.6–10.5)
CHLORIDE SERPL-SCNC: 105 MMOL/L (ref 98–107)
CHOLEST SERPL-MCNC: 173 MG/DL (ref 0–200)
CO2 SERPL-SCNC: 24.8 MMOL/L (ref 22–29)
CREAT SERPL-MCNC: 0.84 MG/DL (ref 0.57–1)
DEPRECATED RDW RBC AUTO: 37.7 FL (ref 37–54)
EOSINOPHIL # BLD AUTO: 0.18 10*3/MM3 (ref 0–0.4)
EOSINOPHIL NFR BLD AUTO: 3.7 % (ref 0.3–6.2)
ERYTHROCYTE [DISTWIDTH] IN BLOOD BY AUTOMATED COUNT: 11.9 % (ref 12.3–15.4)
GFR SERPL CREATININE-BSD FRML MDRD: 66 ML/MIN/1.73
GLOBULIN UR ELPH-MCNC: 2 GM/DL
GLUCOSE SERPL-MCNC: 98 MG/DL (ref 65–99)
HCT VFR BLD AUTO: 45.3 % (ref 34–46.6)
HDLC SERPL-MCNC: 70 MG/DL (ref 40–60)
HGB BLD-MCNC: 15.7 G/DL (ref 12–15.9)
IMM GRANULOCYTES # BLD AUTO: 0.01 10*3/MM3 (ref 0–0.05)
IMM GRANULOCYTES NFR BLD AUTO: 0.2 % (ref 0–0.5)
LDLC SERPL CALC-MCNC: 88 MG/DL (ref 0–100)
LDLC/HDLC SERPL: 1.24 {RATIO}
LYMPHOCYTES # BLD AUTO: 1.5 10*3/MM3 (ref 0.7–3.1)
LYMPHOCYTES NFR BLD AUTO: 30.9 % (ref 19.6–45.3)
MAGNESIUM SERPL-MCNC: 2.1 MG/DL (ref 1.6–2.4)
MCH RBC QN AUTO: 30.5 PG (ref 26.6–33)
MCHC RBC AUTO-ENTMCNC: 34.7 G/DL (ref 31.5–35.7)
MCV RBC AUTO: 88.1 FL (ref 79–97)
MONOCYTES # BLD AUTO: 0.42 10*3/MM3 (ref 0.1–0.9)
MONOCYTES NFR BLD AUTO: 8.7 % (ref 5–12)
NEUTROPHILS NFR BLD AUTO: 2.71 10*3/MM3 (ref 1.7–7)
NEUTROPHILS NFR BLD AUTO: 55.9 % (ref 42.7–76)
NRBC BLD AUTO-RTO: 0 /100 WBC (ref 0–0.2)
PLATELET # BLD AUTO: 305 10*3/MM3 (ref 140–450)
PMV BLD AUTO: 10.3 FL (ref 6–12)
POTASSIUM SERPL-SCNC: 4.2 MMOL/L (ref 3.5–5.2)
PROT SERPL-MCNC: 6.3 G/DL (ref 6–8.5)
RBC # BLD AUTO: 5.14 10*6/MM3 (ref 3.77–5.28)
SODIUM SERPL-SCNC: 142 MMOL/L (ref 136–145)
TRIGL SERPL-MCNC: 80 MG/DL (ref 0–150)
TSH SERPL DL<=0.05 MIU/L-ACNC: 2.89 UIU/ML (ref 0.27–4.2)
VLDLC SERPL-MCNC: 15 MG/DL (ref 5–40)
WBC NRBC COR # BLD: 4.85 10*3/MM3 (ref 3.4–10.8)

## 2022-01-19 PROCEDURE — 84443 ASSAY THYROID STIM HORMONE: CPT | Performed by: INTERNAL MEDICINE

## 2022-01-19 PROCEDURE — 83735 ASSAY OF MAGNESIUM: CPT | Performed by: INTERNAL MEDICINE

## 2022-01-19 PROCEDURE — 80061 LIPID PANEL: CPT | Performed by: INTERNAL MEDICINE

## 2022-01-19 PROCEDURE — 80053 COMPREHEN METABOLIC PANEL: CPT | Performed by: INTERNAL MEDICINE

## 2022-01-19 PROCEDURE — 85025 COMPLETE CBC W/AUTO DIFF WBC: CPT | Performed by: INTERNAL MEDICINE

## 2022-01-19 PROCEDURE — 82306 VITAMIN D 25 HYDROXY: CPT | Performed by: INTERNAL MEDICINE

## 2022-01-19 PROCEDURE — 36415 COLL VENOUS BLD VENIPUNCTURE: CPT | Performed by: INTERNAL MEDICINE

## 2022-01-20 ENCOUNTER — OFFICE VISIT (OUTPATIENT)
Dept: FAMILY MEDICINE CLINIC | Facility: CLINIC | Age: 77
End: 2022-01-20

## 2022-01-20 VITALS
SYSTOLIC BLOOD PRESSURE: 130 MMHG | BODY MASS INDEX: 28.77 KG/M2 | HEIGHT: 66 IN | HEART RATE: 85 BPM | DIASTOLIC BLOOD PRESSURE: 80 MMHG | WEIGHT: 179 LBS | OXYGEN SATURATION: 97 %

## 2022-01-20 DIAGNOSIS — Z12.31 ENCOUNTER FOR SCREENING MAMMOGRAM FOR MALIGNANT NEOPLASM OF BREAST: ICD-10-CM

## 2022-01-20 DIAGNOSIS — Z76.89 ENCOUNTER TO ESTABLISH CARE WITH NEW DOCTOR: Primary | ICD-10-CM

## 2022-01-20 DIAGNOSIS — L65.9 HAIR LOSS: ICD-10-CM

## 2022-01-20 PROCEDURE — 99212 OFFICE O/P EST SF 10 MIN: CPT | Performed by: FAMILY MEDICINE

## 2022-01-20 NOTE — PROGRESS NOTES
Family Medicine  Antonia Houston MD    Subjective:     Akiko Blackwell is a 76 y.o. female who presents to [facility] care.  She has history of hypothyroidism that is managed by Endocrinology.  She follows regularly with GI for her GERD which is well controlled with current medications.  She does complain of thinning hair and brittle nails which has been going on for about 2 years.  Reports she was initially told it could be due to her thyroid however TSH is well controlled with current dose of synthroid.    The following portions of the patient's history were reviewed and updated as appropriate: allergies, current medications, past family history, past medical history, past social history, past surgical history and problem list.    Past Medical Hx:  Past Medical History:   Diagnosis Date   • Allergic rhinitis    • Atrophic vaginitis    • Breast cyst    • Diverticular disease of colon    • Esophagitis    • Family history of malignant neoplasm of gastrointestinal tract    • GERD (gastroesophageal reflux disease)    • Multiple gastric polyps 01/07/2020   • Osteopenia    • Subclinical hypothyroidism    • Thyroid nodule    • Vitamin D deficiency        Past Surgical Hx:  Past Surgical History:   Procedure Laterality Date   • BREAST BIOPSY      BX BREAST PERCUT W/O IMAGE 73333 (1)   x2   • CERVIX SURGERY  05/15/1973    Conization biopsy of cervix. Cervical cautery.   • COLONOSCOPY  06/20/2016    Diverticulosis in the sigmoid colon and in the descending colon.External and internal hemorrhoids.No specimens collected.   • COLONOSCOPY N/A 6/30/2021    Procedure: COLONOSCOPY;  Surgeon: Alfonzo Wahl MD;  Location: [facility] ENDOSCOPY;  Service: Gastroenterology;  Laterality: N/A;   • DILATATION AND CURETTAGE  10/10/1990    Fractional   • ENDOSCOPY N/A 1/7/2020    Procedure: ESOPHAGOGASTRODUODENOSCOPY WITH DILATATION--possible;  Surgeon: Alfonzo Wahl MD;  Location: [facility] ENDOSCOPY;  Service: Gastroenterology    • ENDOSCOPY N/A 6/30/2021    Procedure: ESOPHAGOGASTRODUODENOSCOPY;  Surgeon: Alfonzo Wahl MD;  Location: [facility] ENDOSCOPY;  Service: Gastroenterology;  Laterality: N/A;   • ENDOSCOPY W/ PEG TUBE PLACEMENT  06/20/2012    Esophagitis seen. Biopsy taken. Gastritis in stomach. Biopsy taken. Hiatus hernia in stomach. Normal duodenum. Biopsy taken.   • EXCISION LESION  05/18/1967    Excision of inclusion cyst, vagina.   • TONSILLECTOMY  01/15/1964    Recurring acute tonsillitis.   • UPPER GASTROINTESTINAL ENDOSCOPY  06/20/2012   • UPPER GASTROINTESTINAL ENDOSCOPY  01/07/2020   • UPPER GASTROINTESTINAL ENDOSCOPY  06/30/2021       Current Meds:    Current Outpatient Medications:   •  calcium carbonate (TUMS ULTRA) 1000 MG chewable tablet, Chew 1 tablet Daily., Disp: , Rfl:   •  Cholecalciferol (Vitamin D3) 1.25 MG (02898 UT) capsule, Take 1 capsule by mouth Every 7 (Seven) Days., Disp: 12 capsule, Rfl: 11  •  levothyroxine (SYNTHROID, LEVOTHROID) 88 MCG tablet, Take 1 tab daily Monday to Saturday and 1.5 tabs on Sunday, Disp: 96 tablet, Rfl: 3  •  omeprazole (priLOSEC) 40 MG capsule, TAKE ONE CAPSULE BY MOUTH DAILY, Disp: 30 capsule, Rfl: 2  •  Probiotic Product (ALIGN PO), Take  by mouth Daily., Disp: , Rfl:     Allergies:  No Known Allergies    Family Hx:  Family History   Problem Relation Age of Onset   • Ovarian cancer Mother         Onset age 70-74 years.   • Cancer Brother         Colorectal Cancer, onset age 70-74 years.   • Colon cancer Brother    • Diabetes Other         Social History:  Social History     Socioeconomic History   • Marital status:    Tobacco Use   • Smoking status: Former Smoker   • Smokeless tobacco: Never Used   Vaping Use   • Vaping Use: Never used   Substance and Sexual Activity   • Alcohol use: No   • Drug use: No   • Sexual activity: Defer       Review of Systems  Review of Systems   Constitutional: Negative for chills and fever.   Eyes: Negative for photophobia and visual  "disturbance.   Respiratory: Negative for cough and shortness of breath.    Cardiovascular: Negative for chest pain, palpitations and leg swelling.   Gastrointestinal: Negative for nausea and vomiting.   Endocrine:        Hair loss, brittle nails   Genitourinary: Negative for dysuria, vaginal bleeding and vaginal discharge.   Musculoskeletal: Negative for arthralgias and gait problem.   Skin: Negative for color change and rash.   Neurological: Negative for dizziness, light-headedness and headache.   Psychiatric/Behavioral: Negative for depressed mood. The patient is not nervous/anxious.    All other systems reviewed and are negative.       Objective:     /80 (BP Location: Left arm, Patient Position: Sitting, Cuff Size: Adult)   Pulse 85   Ht 167.6 cm (66\")   Wt 81.2 kg (179 lb)   LMP  (LMP Unknown)   SpO2 97%   BMI 28.89 kg/m²   Physical Exam  Vitals reviewed.   Constitutional:       General: She is not in acute distress.     Appearance: She is well-developed.   HENT:      Head: Normocephalic and atraumatic.      Nose: Nose normal.   Eyes:      Conjunctiva/sclera: Conjunctivae normal.      Pupils: Pupils are equal, round, and reactive to light.   Cardiovascular:      Rate and Rhythm: Normal rate and regular rhythm.      Heart sounds: Normal heart sounds.   Pulmonary:      Effort: Pulmonary effort is normal. No respiratory distress.      Breath sounds: Normal breath sounds. No wheezing.   Abdominal:      General: Bowel sounds are normal. There is no distension.      Palpations: Abdomen is soft.      Tenderness: There is no abdominal tenderness.   Musculoskeletal:         General: Normal range of motion.      Cervical back: Normal range of motion and neck supple.   Skin:     General: Skin is warm and dry.      Capillary Refill: Capillary refill takes less than 2 seconds.   Neurological:      Mental Status: She is alert and oriented to person, place, and time.   Psychiatric:         Behavior: Behavior is " cooperative.          Assessment/Plan:     Diagnoses and all orders for this visit:    1. Encounter to establish care with new doctor (Primary)    2. Hair loss    3. Encounter for screening mammogram for malignant neoplasm of breast  -     Mammo Screening Digital Tomosynthesis Bilateral With CAD; Future         Follow-up:     Return in about 1 year (around 1/20/2023) for Annual physical.    Preventative:  Health Maintenance   Topic Date Due   • COVID-19 Vaccine (3 - Booster for Pfizer series) 07/19/2021   • ANNUAL WELLNESS VISIT  01/15/2023 (Originally 3/16/2018)   • MAMMOGRAM  04/02/2022   • DXA SCAN  08/10/2022   • COLORECTAL CANCER SCREENING  06/30/2026   • TDAP/TD VACCINES (2 - Td or Tdap) 03/16/2027   • HEPATITIS C SCREENING  Completed   • INFLUENZA VACCINE  Completed   • Pneumococcal Vaccine 65+  Completed   • ZOSTER VACCINE  Completed     Female Preventative: Up to date; desires mammogram in April.  Recommended: none  Vaccine Counseling: N/A    Weight  -Class: Overweight: 25.0-29.9kg/m2   -Patient's Body mass index is 28.89 kg/m². indicating that she is overweight (BMI 25-29.9). Obesity-related health conditions include the following: GERD. Obesity is unchanged. BMI is is above average; BMI management plan is completed. We discussed portion control and increasing exercise..      Alcohol use:  reports no history of alcohol use.  Nicotine status  reports that she has quit smoking. She has never used smokeless tobacco.    Goals     • Weight (lb) < 66 kg (145 lb 8.1 oz)      Barriers to goals: None            RISK SCORE: 2    [place] [address]  Office: [phone]    This document has been electronically signed by Antonia Houston MD on January 20, 2022 14:11 CST

## 2022-01-21 RX ORDER — OMEPRAZOLE 40 MG/1
40 CAPSULE, DELAYED RELEASE ORAL DAILY
Qty: 30 CAPSULE | Refills: 2 | Status: SHIPPED | OUTPATIENT
Start: 2022-01-21 | End: 2022-04-07 | Stop reason: SDUPTHER

## 2022-01-27 ENCOUNTER — OFFICE VISIT (OUTPATIENT)
Dept: ENDOCRINOLOGY | Facility: CLINIC | Age: 77
End: 2022-01-27

## 2022-01-27 VITALS
WEIGHT: 180.5 LBS | SYSTOLIC BLOOD PRESSURE: 124 MMHG | HEART RATE: 81 BPM | OXYGEN SATURATION: 98 % | DIASTOLIC BLOOD PRESSURE: 70 MMHG | HEIGHT: 66 IN | BODY MASS INDEX: 29.01 KG/M2

## 2022-01-27 DIAGNOSIS — E53.8 B12 DEFICIENCY: ICD-10-CM

## 2022-01-27 DIAGNOSIS — E55.9 VITAMIN D DEFICIENCY: ICD-10-CM

## 2022-01-27 DIAGNOSIS — E03.8 HYPOTHYROIDISM DUE TO HASHIMOTO'S THYROIDITIS: Primary | ICD-10-CM

## 2022-01-27 DIAGNOSIS — E06.3 HYPOTHYROIDISM DUE TO HASHIMOTO'S THYROIDITIS: Primary | ICD-10-CM

## 2022-01-27 PROCEDURE — 99214 OFFICE O/P EST MOD 30 MIN: CPT | Performed by: INTERNAL MEDICINE

## 2022-01-27 RX ORDER — CHOLECALCIFEROL (VITAMIN D3) 1250 MCG
50000 CAPSULE ORAL
Qty: 12 CAPSULE | Refills: 11 | Status: SHIPPED | OUTPATIENT
Start: 2022-01-27 | End: 2022-07-27 | Stop reason: SDUPTHER

## 2022-01-27 RX ORDER — LEVOTHYROXINE SODIUM 88 UG/1
TABLET ORAL
Qty: 96 TABLET | Refills: 3 | Status: SHIPPED | OUTPATIENT
Start: 2022-01-27 | End: 2022-07-27 | Stop reason: SDUPTHER

## 2022-01-27 RX ORDER — MULTIPLE VITAMINS W/ MINERALS TAB 9MG-400MCG
1 TAB ORAL DAILY
COMMUNITY

## 2022-01-27 NOTE — PATIENT INSTRUCTIONS
Component      Latest Ref Rng & Units 1/19/2022   WBC      3.40 - 10.80 10*3/mm3 4.85   RBC      3.77 - 5.28 10*6/mm3 5.14   Hemoglobin      12.0 - 15.9 g/dL 15.7   Hematocrit      34.0 - 46.6 % 45.3   MCV      79.0 - 97.0 fL 88.1   MCH      26.6 - 33.0 pg 30.5   MCHC      31.5 - 35.7 g/dL 34.7   RDW      12.3 - 15.4 % 11.9 (L)   RDW-SD      37.0 - 54.0 fl 37.7   MPV      6.0 - 12.0 fL 10.3   Platelets      140 - 450 10*3/mm3 305   Neutrophil Rel %      42.7 - 76.0 % 55.9   Lymphocyte Rel %      19.6 - 45.3 % 30.9   Monocyte Rel %      5.0 - 12.0 % 8.7   Eosinophil Rel %      0.3 - 6.2 % 3.7   Basophil Rel %      0.0 - 1.5 % 0.6   Immature Granulocyte Rel %      0.0 - 0.5 % 0.2   Neutrophils Absolute      1.70 - 7.00 10*3/mm3 2.71   Lymphocytes Absolute      0.70 - 3.10 10*3/mm3 1.50   Monocytes Absolute      0.10 - 0.90 10*3/mm3 0.42   Eosinophils Absolute      0.00 - 0.40 10*3/mm3 0.18   Basophils Absolute      0.00 - 0.20 10*3/mm3 0.03   Immature Grans, Absolute      0.00 - 0.05 10*3/mm3 0.01   nRBC      0.0 - 0.2 /100 WBC 0.0   Glucose      65 - 99 mg/dL 98   BUN      8 - 23 mg/dL 13   Creatinine      0.57 - 1.00 mg/dL 0.84   Sodium      136 - 145 mmol/L 142   Potassium      3.5 - 5.2 mmol/L 4.2   Chloride      98 - 107 mmol/L 105   CO2      22.0 - 29.0 mmol/L 24.8   Calcium      8.6 - 10.5 mg/dL 9.6   Total Protein      6.0 - 8.5 g/dL 6.3   Albumin      3.50 - 5.20 g/dL 4.30   ALT (SGPT)      1 - 33 U/L 14   AST (SGOT)      1 - 32 U/L 18   Alkaline Phosphatase      39 - 117 U/L 115   Total Bilirubin      0.0 - 1.2 mg/dL 0.5   eGFR Non African Am      >60 mL/min/1.73 66   Globulin      gm/dL 2.0   A/G Ratio      g/dL 2.2   BUN/Creatinine Ratio      7.0 - 25.0 15.5   Anion Gap      5.0 - 15.0 mmol/L 12.2   Total Cholesterol      0 - 200 mg/dL 173   Triglycerides      0 - 150 mg/dL 80   HDL Cholesterol      40 - 60 mg/dL 70 (H)   LDL Cholesterol       0 - 100 mg/dL 88   VLDL Cholesterol      5 - 40 mg/dL 15    LDL/HDL Ratio       1.24   Magnesium      1.6 - 2.4 mg/dL 2.1   TSH Baseline      0.270 - 4.200 uIU/mL 2.890   25 Hydroxy, Vitamin D      30.0 - 100.0 ng/ml 77.0

## 2022-01-27 NOTE — PROGRESS NOTES
"Chief Complaint  Hypothyroidism                                      This was a Telehealth Encounter. Benefits and Disadvantages of a Telehealth Visit were discussed and accepted by patient. .  Patient agreed to receive service through Telehealth visit as patient is being compliant with social distancing recommendations imparted by CDC.     You have chosen to receive care through a telehealth visit.  Do you consent to use a video/audio connection for your medical care today? Yes            Hypothyroidism    Thyroid Problem           reason - hypothyroidism     duration - March 2013     alleviating factors - levothyroxine     symptoms - hair falling and brittle nails - improved     --     thyroid nodule  personal interpretation , pseudonodule     Feb 2015 compared to Nov 2016   my interpretation - pseudonodules   radiologist interpreation - slight increase in size from 9 mm to 1 cm of largest nodule                ---     Ostepenia    On vit D and calcium      h o  jaw fracture,      Her only present problem is GERD      Objective   Vital Signs:   /70   Pulse 81   Ht 167.6 cm (66\")   Wt 81.9 kg (180 lb 8 oz)   SpO2 98%   BMI 29.13 kg/m²     Physical Exam  Constitutional:       Appearance: Normal appearance.   HENT:      Head: Normocephalic.   Cardiovascular:      Rate and Rhythm: Normal rate and regular rhythm.   Pulmonary:      Effort: Pulmonary effort is normal.      Breath sounds: Normal breath sounds.   Musculoskeletal:      Cervical back: Normal range of motion and neck supple.      Right lower leg: No edema.      Left lower leg: No edema.   Neurological:      Mental Status: She is alert.      mild  RLE    Result Review :   The following data was reviewed by: Maximino Paige MD on 01/25/2021:  Common labs    Common Labsle 7/19/21 7/19/21 7/19/21 1/19/22 1/19/22 1/19/22    0812 0812 0812 0753 0753 0753   Glucose 95    98    BUN 17    13    Creatinine 0.88    0.84    eGFR Non African Am " 62    66    Sodium 143    142    Potassium 4.5    4.2    Chloride 105    105    Calcium 9.6    9.6    Albumin 4.20    4.30    Total Bilirubin 0.7    0.5    Alkaline Phosphatase 101    115    AST (SGOT) 22    18    ALT (SGPT) 15    14    WBC   4.79 4.85     Hemoglobin   15.9 15.7     Hematocrit   45.9 45.3     Platelets   288 305     Total Cholesterol  181    173   Triglycerides  85    80   HDL Cholesterol  63 (A)    70 (A)   LDL Cholesterol   102 (A)    88   (A) Abnormal value              Thyroid Workup    Lab Results   Component Value Date    TSH 2.890 01/19/2022    TSH 0.970 07/19/2021       Lab Results   Component Value Date    FREET4 1.23 04/15/2015    FREET4 1.48 02/26/2015       No results found for: T3FREE    TPO antibodies    Lab Results   Component Value Date    THYROIDAB 25.1 (H) 02/04/2014       TSI antibodies    No results found for: THYRSTIMIMMU    --------------------------    Lab Results   Component Value Date    FLKICATB46 302 07/21/2020       ---------------------------    Lab Results   Component Value Date    KPJA39FE 77.0 01/19/2022    LIIS57IY 61.1 07/19/2021                              Assessment and Plan    Problem List Items Addressed This Visit     None                  Hypothyroidism     on Levothyroxine 88 mcgs , 6.5 tabs per week - one hour before supper or at bedtime     Please go back to daily - increase to 7.5     Also on nexium - now dexilant ( changed to pepcid ) and ca + vit D - in a.m.                  ---     Osteopenia    COMPARISON: DEXA 5/30/2018     FINDINGS:      PA Spine L1-L4         Density: 0.834 g/cm2  T-score: -1.9   Z-score: 0.5     2.3% decrease from most recent prior.     Mean Bilateral Femoral Neck            Density: 0.753 g/cm2  T-score: -0.9   Z-score: 1.3           on 600 D + 400 Calcium     add extra 1000 u daily - stop      Add 50 th u every 2 weeks - every week     Last bone density 8-2020      ---     Pseudonodules last US from June 2015 compared to Nov  2016      Repeat and stable , repeat only if clinically there is a palpable nodule      --     on nexium - now on dexilant - now pepcid   she rightufully requests for Mg assessment and nl      --     Sec polycythemia     reasses     If persistent do sleep study - resolved           Right carotid bruit, carotid US done ,    IMPRESSION:  1. There is 50-70% stenosis in the distal right ICA by peak  velocity criteria without significant plaque appreciated. This  may be due to tortuosity.      2. No significant atherosclerotic plaque       Reassess cholesterol   At goal     Mild RLE , use compression stockings

## 2022-04-07 ENCOUNTER — OFFICE VISIT (OUTPATIENT)
Dept: GASTROENTEROLOGY | Facility: CLINIC | Age: 77
End: 2022-04-07

## 2022-04-07 VITALS
SYSTOLIC BLOOD PRESSURE: 126 MMHG | DIASTOLIC BLOOD PRESSURE: 63 MMHG | WEIGHT: 179.8 LBS | BODY MASS INDEX: 28.22 KG/M2 | HEIGHT: 67 IN | HEART RATE: 84 BPM

## 2022-04-07 DIAGNOSIS — R10.10 PAIN OF UPPER ABDOMEN: ICD-10-CM

## 2022-04-07 DIAGNOSIS — K57.90 DIVERTICULOSIS: ICD-10-CM

## 2022-04-07 DIAGNOSIS — K21.00 GASTROESOPHAGEAL REFLUX DISEASE WITH ESOPHAGITIS WITHOUT HEMORRHAGE: Primary | ICD-10-CM

## 2022-04-07 DIAGNOSIS — K31.7 FUNDIC GLAND POLYPOSIS OF STOMACH: ICD-10-CM

## 2022-04-07 DIAGNOSIS — K44.9 HIATAL HERNIA: ICD-10-CM

## 2022-04-07 PROCEDURE — 99213 OFFICE O/P EST LOW 20 MIN: CPT | Performed by: PHYSICIAN ASSISTANT

## 2022-04-07 RX ORDER — SUCRALFATE 1 G/1
1 TABLET ORAL 3 TIMES DAILY PRN
Qty: 90 TABLET | Refills: 2 | Status: SHIPPED | OUTPATIENT
Start: 2022-04-07 | End: 2022-07-05

## 2022-04-07 RX ORDER — OMEPRAZOLE 40 MG/1
40 CAPSULE, DELAYED RELEASE ORAL DAILY
Qty: 90 CAPSULE | Refills: 1 | Status: SHIPPED | OUTPATIENT
Start: 2022-04-07 | End: 2022-07-05

## 2022-04-07 NOTE — PROGRESS NOTES
Chief Complaint   Patient presents with   • Heartburn   • Hiatal Hernia   • Abdominal Pain       ENDO PROCEDURE ORDERED:    Subjective    Akiko Blackwell is a 76 y.o. female. she is here today for follow-up.    History of Present Illness    Patient seen on a recheck of her GERD, hiatal hernia, abdominal pain. Last seen 10/11/2021. Patient states the Prilosec dose not seem to be doing as good as it was in the past. She previously did better on the Dexilant but was concerned about the polyposis of her stomach. She has been trying to watch her diet. Anything spicy or fried does cause her to have more discomfort and she has to be very careful with those. She denied dysphagia. Bowels are regular without blood or mucus. Weight is up 2 pounds since last visit. Last EGD/colonoscopy 06/30/2021 showed gastric polyps, gastritis, diverticulosis, hemorrhoids.     Laboratory 01/19/2022 showed normal vitamin D, cholesterol, TSH, magnesium, CBC, CMP.     A/P: Patient with chronic GERD, hiatal hernia, abdominal pain. Discussed the risks, benefits, alternatives to further evaluation and treatment including possibly changing the medication. She would like to continue on the Prilosec 40 mg daily. I gave her a 90 day supply. Also will give her a trial on Carafate. She can take that up to 3 times a day when she has more symptoms, otherwise she does not have to take it regularly. Will plan follow-up in 6 months, sooner if needed, further pending clinical course and the results of the above.       The following portions of the patient's history were reviewed and updated as appropriate:   Past Medical History:   Diagnosis Date   • Allergic rhinitis    • Atrophic vaginitis    • Breast cyst    • Diverticular disease of colon    • Esophagitis    • Family history of malignant neoplasm of gastrointestinal tract    • GERD (gastroesophageal reflux disease)    • Multiple gastric polyps 01/07/2020   • Osteopenia    • Subclinical hypothyroidism     • Thyroid nodule    • Vitamin D deficiency      Past Surgical History:   Procedure Laterality Date   • BREAST BIOPSY      BX BREAST PERCUT W/O IMAGE  (1)   x2   • CERVIX SURGERY  05/15/1973    Conization biopsy of cervix. Cervical cautery.   • COLONOSCOPY  2016    Diverticulosis in the sigmoid colon and in the descending colon.External and internal hemorrhoids.No specimens collected.   • COLONOSCOPY N/A 2021    Procedure: COLONOSCOPY;  Surgeon: Alfonzo Wahl MD;  Location: [facility] ENDOSCOPY;  Service: Gastroenterology;  Laterality: N/A;   • DILATATION AND CURETTAGE  10/10/1990    Fractional   • ENDOSCOPY N/A 2020    Procedure: ESOPHAGOGASTRODUODENOSCOPY WITH DILATATION--possible;  Surgeon: Alfonzo Wahl MD;  Location: [facility] ENDOSCOPY;  Service: Gastroenterology   • ENDOSCOPY N/A 2021    Procedure: ESOPHAGOGASTRODUODENOSCOPY;  Surgeon: Alfonzo Wahl MD;  Location: [facility] ENDOSCOPY;  Service: Gastroenterology;  Laterality: N/A;   • ENDOSCOPY W/ PEG TUBE PLACEMENT  2012    Esophagitis seen. Biopsy taken. Gastritis in stomach. Biopsy taken. Hiatus hernia in stomach. Normal duodenum. Biopsy taken.   • EXCISION LESION  1967    Excision of inclusion cyst, vagina.   • TONSILLECTOMY  01/15/1964    Recurring acute tonsillitis.   • UPPER GASTROINTESTINAL ENDOSCOPY  2012   • UPPER GASTROINTESTINAL ENDOSCOPY  2020   • UPPER GASTROINTESTINAL ENDOSCOPY  2021     Family History   Problem Relation Age of Onset   • Ovarian cancer Mother         Onset age 70-74 years.   • Cancer Brother         Colorectal Cancer, onset age 70-74 years.   • Colon cancer Brother    • Diabetes Other      OB History        2    Para   2    Term   2            AB        Living           SAB        IAB        Ectopic        Molar        Multiple        Live Births                  No Known Allergies  Social History     Socioeconomic History   • Marital status:    Tobacco  "Use   • Smoking status: Former Smoker   • Smokeless tobacco: Never Used   Vaping Use   • Vaping Use: Never used   Substance and Sexual Activity   • Alcohol use: No   • Drug use: No   • Sexual activity: Defer     Current Medications:  Prior to Admission medications    Medication Sig Start Date End Date Taking? Authorizing Provider   calcium carbonate (TUMS ULTRA) 1000 MG chewable tablet Chew 1 tablet Daily.   Yes Elmo Hansen MD   Cholecalciferol (Vitamin D3) 1.25 MG (48801 UT) capsule Take 1 capsule by mouth Every 7 (Seven) Days. 1/27/22  Yes Maximino Marina MD   levothyroxine (SYNTHROID, LEVOTHROID) 88 MCG tablet Take 1 tab daily Monday to Saturday and 1.5 tabs on Sunday 1/27/22  Yes Maximino Marina MD   multivitamin with minerals tablet tablet Take 1 tablet by mouth Daily. Vitamin C , Vitamin E , biotin, collagen   Yes Elmo Hansen MD   omeprazole (priLOSEC) 40 MG capsule Take 1 capsule by mouth Daily. 1/21/22  Yes Henry Caballero PA-C   Probiotic Product (ALIGN PO) Take 1 capsule by mouth As Needed.   Yes Elmo Hansen MD     Review of Systems  Review of Systems       Objective    /63   Pulse 84   Ht 170.2 cm (67\")   Wt 81.6 kg (179 lb 12.8 oz)   LMP  (LMP Unknown)   BMI 28.16 kg/m²   Physical Exam  Vitals and nursing note reviewed.   Constitutional:       General: She is not in acute distress.     Appearance: She is well-developed.   HENT:      Head: Normocephalic and atraumatic.   Eyes:      Pupils: Pupils are equal, round, and reactive to light.   Cardiovascular:      Rate and Rhythm: Normal rate and regular rhythm.      Heart sounds: Normal heart sounds.   Pulmonary:      Effort: Pulmonary effort is normal.      Breath sounds: Normal breath sounds.   Abdominal:      General: Bowel sounds are normal. There is no distension or abdominal bruit.      Palpations: Abdomen is soft. Abdomen is not rigid. There is no shifting dullness or mass.      Tenderness: " There is abdominal tenderness. There is no guarding or rebound.      Hernia: No hernia is present. There is no hernia in the ventral area.   Musculoskeletal:         General: Normal range of motion.      Cervical back: Normal range of motion.   Skin:     General: Skin is warm and dry.   Neurological:      Mental Status: She is alert and oriented to person, place, and time.   Psychiatric:         Behavior: Behavior normal.         Thought Content: Thought content normal.         Judgment: Judgment normal.       Assessment/Plan      1. Gastroesophageal reflux disease with esophagitis without hemorrhage    2. Fundic gland polyposis of stomach    3. Hiatal hernia    4. Pain of upper abdomen    5. Diverticulosis    .   Diagnoses and all orders for this visit:    1. Gastroesophageal reflux disease with esophagitis without hemorrhage (Primary)    2. Fundic gland polyposis of stomach    3. Hiatal hernia    4. Pain of upper abdomen    5. Diverticulosis    Other orders  -     sucralfate (Carafate) 1 g tablet; Take 1 tablet by mouth 3 (Three) Times a Day As Needed (before meals as needed for abd pain, burning, indigestion).  Dispense: 90 tablet; Refill: 2  -     omeprazole (priLOSEC) 40 MG capsule; Take 1 capsule by mouth Daily.  Dispense: 90 capsule; Refill: 1        Orders placed during this encounter include:  No orders of the defined types were placed in this encounter.      Medications prescribed:  New Medications Ordered This Visit   Medications   • sucralfate (Carafate) 1 g tablet     Sig: Take 1 tablet by mouth 3 (Three) Times a Day As Needed (before meals as needed for abd pain, burning, indigestion).     Dispense:  90 tablet     Refill:  2   • omeprazole (priLOSEC) 40 MG capsule     Sig: Take 1 capsule by mouth Daily.     Dispense:  90 capsule     Refill:  1       Requested Prescriptions     Signed Prescriptions Disp Refills   • sucralfate (Carafate) 1 g tablet 90 tablet 2     Sig: Take 1 tablet by mouth 3 (Three)  Times a Day As Needed (before meals as needed for abd pain, burning, indigestion).   • omeprazole (priLOSEC) 40 MG capsule 90 capsule 1     Sig: Take 1 capsule by mouth Daily.       Review and/or summary of lab tests, radiology, procedures, medications. Review and summary of old records and obtaining of history. The risks and benefits of my recommendations, as well as other treatment options were discussed with the patient today. Questions were answered.    Follow-up: Return in about 6 months (around 10/7/2022), or if symptoms worsen or fail to improve.     * Surgery not found *      This document has been electronically signed by Henry Caballero PA-C on April 15, 2022 16:18 CDT      Results for orders placed or performed in visit on 07/26/21   CBC Auto Differential    Specimen: Blood   Result Value Ref Range    WBC 4.85 3.40 - 10.80 10*3/mm3    RBC 5.14 3.77 - 5.28 10*6/mm3    Hemoglobin 15.7 12.0 - 15.9 g/dL    Hematocrit 45.3 34.0 - 46.6 %    MCV 88.1 79.0 - 97.0 fL    MCH 30.5 26.6 - 33.0 pg    MCHC 34.7 31.5 - 35.7 g/dL    RDW 11.9 (L) 12.3 - 15.4 %    RDW-SD 37.7 37.0 - 54.0 fl    MPV 10.3 6.0 - 12.0 fL    Platelets 305 140 - 450 10*3/mm3    Neutrophil % 55.9 42.7 - 76.0 %    Lymphocyte % 30.9 19.6 - 45.3 %    Monocyte % 8.7 5.0 - 12.0 %    Eosinophil % 3.7 0.3 - 6.2 %    Basophil % 0.6 0.0 - 1.5 %    Immature Grans % 0.2 0.0 - 0.5 %    Neutrophils, Absolute 2.71 1.70 - 7.00 10*3/mm3    Lymphocytes, Absolute 1.50 0.70 - 3.10 10*3/mm3    Monocytes, Absolute 0.42 0.10 - 0.90 10*3/mm3    Eosinophils, Absolute 0.18 0.00 - 0.40 10*3/mm3    Basophils, Absolute 0.03 0.00 - 0.20 10*3/mm3    Immature Grans, Absolute 0.01 0.00 - 0.05 10*3/mm3    nRBC 0.0 0.0 - 0.2 /100 WBC   Vitamin D 25 Hydroxy    Specimen: Blood   Result Value Ref Range    25 Hydroxy, Vitamin D 77.0 30.0 - 100.0 ng/ml   TSH    Specimen: Blood   Result Value Ref Range    TSH 2.890 0.270 - 4.200 uIU/mL   Magnesium    Specimen: Blood   Result Value Ref  Range    Magnesium 2.1 1.6 - 2.4 mg/dL   Lipid Panel    Specimen: Blood   Result Value Ref Range    Total Cholesterol 173 0 - 200 mg/dL    Triglycerides 80 0 - 150 mg/dL    HDL Cholesterol 70 (H) 40 - 60 mg/dL    LDL Cholesterol  88 0 - 100 mg/dL    VLDL Cholesterol 15 5 - 40 mg/dL    LDL/HDL Ratio 1.24    Comprehensive Metabolic Panel    Specimen: Blood   Result Value Ref Range    Glucose 98 65 - 99 mg/dL    BUN 13 8 - 23 mg/dL    Creatinine 0.84 0.57 - 1.00 mg/dL    Sodium 142 136 - 145 mmol/L    Potassium 4.2 3.5 - 5.2 mmol/L    Chloride 105 98 - 107 mmol/L    CO2 24.8 22.0 - 29.0 mmol/L    Calcium 9.6 8.6 - 10.5 mg/dL    Total Protein 6.3 6.0 - 8.5 g/dL    Albumin 4.30 3.50 - 5.20 g/dL    ALT (SGPT) 14 1 - 33 U/L    AST (SGOT) 18 1 - 32 U/L    Alkaline Phosphatase 115 39 - 117 U/L    Total Bilirubin 0.5 0.0 - 1.2 mg/dL    eGFR Non African Amer 66 >60 mL/min/1.73    Globulin 2.0 gm/dL    A/G Ratio 2.2 g/dL    BUN/Creatinine Ratio 15.5 7.0 - 25.0    Anion Gap 12.2 5.0 - 15.0 mmol/L   Results for orders placed or performed during the hospital encounter of 06/30/21   Tissue Pathology Exam    Specimen: A: Gastric, Antrum; Tissue    B: Gastric, Body; Tissue   Result Value Ref Range    Case Report       Surgical Pathology Report                         Case: [id number]                                  Authorizing Provider:  Alfonzo Wahl MD        Collected:           06/30/2021 01:23 PM          Ordering Location:     [facility]             Received:            07/01/2021 06:58 AM                                 [facility]                                                     Pathologist:           Richard Jackson MD                                                          Specimens:   1) - Gastric, Antrum, antrum  cold bx                                                               2) - Gastric, Body, gastric polyps   cold bx                                               Final Diagnosis       See  Scanned Report       Results for orders placed or performed in visit on 06/27/21   COVID-19,  [facility], NP SWAB IN TRANSPORT MEDIA 8-10 HR TAT - Swab, Nasopharynx    Specimen: Nasopharynx; Swab   Result Value Ref Range    COVID19 Not Detected Not Detected - Ref. Range   Results for orders placed or performed during the hospital encounter of 02/09/21   COVID-19,  [facility]-HOUSE, NP SWAB IN TRANSPORT MEDIA 8-10 HR TAT - Swab, Anterior nasal    Specimen: Anterior nasal; Swab   Result Value Ref Range    COVID19 Detected (C) Not Detected - Ref. Range   Results for orders placed or performed in visit on 01/25/21   CBC Auto Differential    Specimen: Blood   Result Value Ref Range    WBC 4.79 3.40 - 10.80 10*3/mm3    RBC 5.21 3.77 - 5.28 10*6/mm3    Hemoglobin 15.9 12.0 - 15.9 g/dL    Hematocrit 45.9 34.0 - 46.6 %    MCV 88.1 79.0 - 97.0 fL    MCH 30.5 26.6 - 33.0 pg    MCHC 34.6 31.5 - 35.7 g/dL    RDW 12.4 12.3 - 15.4 %    RDW-SD 39.5 37.0 - 54.0 fl    MPV 10.5 6.0 - 12.0 fL    Platelets 288 140 - 450 10*3/mm3    Neutrophil % 57.2 42.7 - 76.0 %    Lymphocyte % 31.9 19.6 - 45.3 %    Monocyte % 8.8 5.0 - 12.0 %    Eosinophil % 1.3 0.3 - 6.2 %    Basophil % 0.6 0.0 - 1.5 %    Immature Grans % 0.2 0.0 - 0.5 %    Neutrophils, Absolute 2.74 1.70 - 7.00 10*3/mm3    Lymphocytes, Absolute 1.53 0.70 - 3.10 10*3/mm3    Monocytes, Absolute 0.42 0.10 - 0.90 10*3/mm3    Eosinophils, Absolute 0.06 0.00 - 0.40 10*3/mm3    Basophils, Absolute 0.03 0.00 - 0.20 10*3/mm3    Immature Grans, Absolute 0.01 0.00 - 0.05 10*3/mm3    nRBC 0.0 0.0 - 0.2 /100 WBC   Vitamin D 25 Hydroxy    Specimen: Blood   Result Value Ref Range    25 Hydroxy, Vitamin D 61.1 30.0 - 100.0 ng/ml       Specimen: Blood   Result Value Ref Range     11.3 0.0 - 38.1 U/mL   TSH    Specimen: Blood   Result Value Ref Range    TSH 0.970 0.270 - 4.200 uIU/mL   Lipid Panel    Specimen: Blood   Result Value Ref Range    Total Cholesterol 181 0 - 200 mg/dL     Triglycerides 85 0 - 150 mg/dL    HDL Cholesterol 63 (H) 40 - 60 mg/dL    LDL Cholesterol  102 (H) 0 - 100 mg/dL    VLDL Cholesterol 16 5 - 40 mg/dL    LDL/HDL Ratio 1.60    Comprehensive Metabolic Panel    Specimen: Blood   Result Value Ref Range    Glucose 95 65 - 99 mg/dL    BUN 17 8 - 23 mg/dL    Creatinine 0.88 0.57 - 1.00 mg/dL    Sodium 143 136 - 145 mmol/L    Potassium 4.5 3.5 - 5.2 mmol/L    Chloride 105 98 - 107 mmol/L    CO2 24.4 22.0 - 29.0 mmol/L    Calcium 9.6 8.6 - 10.5 mg/dL    Total Protein 6.5 6.0 - 8.5 g/dL    Albumin 4.20 3.50 - 5.20 g/dL    ALT (SGPT) 15 1 - 33 U/L    AST (SGOT) 22 1 - 32 U/L    Alkaline Phosphatase 101 39 - 117 U/L    Total Bilirubin 0.7 0.0 - 1.2 mg/dL    eGFR Non African Amer 62 >60 mL/min/1.73    Globulin 2.3 gm/dL    A/G Ratio 1.8 g/dL    BUN/Creatinine Ratio 19.3 7.0 - 25.0    Anion Gap 13.6 5.0 - 15.0 mmol/L     *Note: Due to a large number of results and/or encounters for the requested time period, some results have not been displayed. A complete set of results can be found in Results Review.

## 2022-07-05 ENCOUNTER — OFFICE VISIT (OUTPATIENT)
Dept: FAMILY MEDICINE CLINIC | Facility: CLINIC | Age: 77
End: 2022-07-05

## 2022-07-05 VITALS
SYSTOLIC BLOOD PRESSURE: 126 MMHG | HEART RATE: 68 BPM | HEIGHT: 67 IN | RESPIRATION RATE: 18 BRPM | BODY MASS INDEX: 27.5 KG/M2 | OXYGEN SATURATION: 98 % | DIASTOLIC BLOOD PRESSURE: 78 MMHG | WEIGHT: 175.2 LBS

## 2022-07-05 DIAGNOSIS — Z76.89 ENCOUNTER TO ESTABLISH CARE: Primary | ICD-10-CM

## 2022-07-05 DIAGNOSIS — E06.3 HYPOTHYROIDISM DUE TO HASHIMOTO'S THYROIDITIS: ICD-10-CM

## 2022-07-05 DIAGNOSIS — E03.8 HYPOTHYROIDISM DUE TO HASHIMOTO'S THYROIDITIS: ICD-10-CM

## 2022-07-05 DIAGNOSIS — K21.00 GASTROESOPHAGEAL REFLUX DISEASE WITH ESOPHAGITIS WITHOUT HEMORRHAGE: ICD-10-CM

## 2022-07-05 PROCEDURE — 99214 OFFICE O/P EST MOD 30 MIN: CPT | Performed by: NURSE PRACTITIONER

## 2022-07-05 NOTE — PROGRESS NOTES
Chief Complaint  Establish Care (Check up)    Subjective          Akiko Blackwell presents to [facility] [place] to establish care. Follows Dr. Reed every 6 months for hypothyroidism and sees GI specialist for her GERD. She is not having any issues today.       Hypothyroidism  This is a chronic problem. The current episode started more than 1 year ago. Associated symptoms include fatigue. Nothing aggravates the symptoms. Treatments tried: levothyroxine. The treatment provided moderate relief.   Heartburn  She complains of heartburn. This is a chronic problem. The current episode started more than 1 year ago. The problem occurs occasionally. The problem has been waxing and waning. The symptoms are aggravated by certain foods. Associated symptoms include fatigue. She has tried an antacid for the symptoms. The treatment provided mild relief.     Outpatient Medications Prior to Visit   Medication Sig Dispense Refill   • calcium carbonate (TUMS ULTRA) 1000 MG chewable tablet Chew 1 tablet Daily.     • Cholecalciferol (Vitamin D3) 1.25 MG (04181 UT) capsule Take 1 capsule by mouth Every 7 (Seven) Days. 12 capsule 11   • levothyroxine (SYNTHROID, LEVOTHROID) 88 MCG tablet Take 1 tab daily Monday to Saturday and 1.5 tabs on Sunday 96 tablet 3   • multivitamin with minerals tablet tablet Take 1 tablet by mouth Daily. Vitamin C , Vitamin E , biotin, collagen     • omeprazole (priLOSEC) 40 MG capsule Take 1 capsule by mouth Daily. 90 capsule 1   • Probiotic Product (ALIGN PO) Take 1 capsule by mouth As Needed.     • sucralfate (Carafate) 1 g tablet Take 1 tablet by mouth 3 (Three) Times a Day As Needed (before meals as needed for abd pain, burning, indigestion). 90 tablet 2     No facility-administered medications prior to visit.       Review of Systems   Constitutional: Positive for fatigue.   Gastrointestinal: Positive for heartburn.         Objective   Vital Signs:  "  Visit Vitals  /78 (BP Location: Left arm, Patient Position: Sitting, Cuff Size: Adult)   Pulse 68   Resp 18   Ht 170.2 cm (67.01\")   Wt 79.5 kg (175 lb 3.2 oz)   LMP  (LMP Unknown)   SpO2 98%   BMI 27.43 kg/m²     Physical Exam  Vitals and nursing note reviewed.   Constitutional:       Appearance: She is well-developed.   HENT:      Head: Normocephalic and atraumatic.   Eyes:      General: Lids are normal.      Conjunctiva/sclera: Conjunctivae normal.   Neck:      Thyroid: No thyroid mass or thyromegaly.      Trachea: Trachea normal. No tracheal tenderness.   Cardiovascular:      Rate and Rhythm: Normal rate.      Pulses: Normal pulses.      Heart sounds: Normal heart sounds.   Pulmonary:      Effort: Pulmonary effort is normal. No respiratory distress.      Breath sounds: Normal breath sounds. No wheezing.   Abdominal:      General: There is no distension.      Palpations: Abdomen is soft. There is no mass.   Musculoskeletal:         General: Normal range of motion.      Cervical back: Normal range of motion. No edema.   Lymphadenopathy:      Head:      Right side of head: No submental, submandibular or tonsillar adenopathy.      Left side of head: No submental, submandibular or tonsillar adenopathy.   Skin:     General: Skin is warm and dry.      Coloration: Skin is not pale.      Findings: No abrasion, erythema or lesion.   Neurological:      Mental Status: She is alert and oriented to person, place, and time.   Psychiatric:         Mood and Affect: Mood is not anxious. Affect is not inappropriate.         Speech: Speech normal.         Behavior: Behavior normal.         Thought Content: Thought content normal.         Judgment: Judgment normal. Judgment is not impulsive.        Result Review :                 Assessment and Plan    Diagnoses and all orders for this visit:    1. Encounter to establish care (Primary)    2. Hypothyroidism due to Hashimoto's thyroiditis    3. Gastroesophageal reflux disease " with esophagitis without hemorrhage      Continue current medications     Continue to follow with specialist     She has lab work due for , we will review labs once they are done      Follow Up   Return in about 1 year (around 7/5/2023), or if symptoms worsen or fail to improve.  Patient was given instructions and counseling regarding her condition or for health maintenance advice. Please see specific information pulled into the AVS if appropriate.           This document has been electronically signed by LOUISE Coto on July 6, 2022 11:01 CDT

## 2022-07-22 ENCOUNTER — LAB (OUTPATIENT)
Dept: LAB | Facility: HOSPITAL | Age: 77
End: 2022-07-22

## 2022-07-22 LAB
25(OH)D3 SERPL-MCNC: 77.3 NG/ML (ref 30–100)
ALBUMIN SERPL-MCNC: 4.1 G/DL (ref 3.5–5.2)
ALBUMIN/GLOB SERPL: 1.8 G/DL
ALP SERPL-CCNC: 112 U/L (ref 39–117)
ALT SERPL W P-5'-P-CCNC: 14 U/L (ref 1–33)
ANION GAP SERPL CALCULATED.3IONS-SCNC: 6 MMOL/L (ref 5–15)
AST SERPL-CCNC: 18 U/L (ref 1–32)
BASOPHILS # BLD AUTO: 0.03 10*3/MM3 (ref 0–0.2)
BASOPHILS NFR BLD AUTO: 0.5 % (ref 0–1.5)
BILIRUB SERPL-MCNC: 0.5 MG/DL (ref 0–1.2)
BUN SERPL-MCNC: 13 MG/DL (ref 8–23)
BUN/CREAT SERPL: 15.1 (ref 7–25)
CALCIUM SPEC-SCNC: 9.9 MG/DL (ref 8.6–10.5)
CHLORIDE SERPL-SCNC: 106 MMOL/L (ref 98–107)
CHOLEST SERPL-MCNC: 184 MG/DL (ref 0–200)
CO2 SERPL-SCNC: 31 MMOL/L (ref 22–29)
CREAT SERPL-MCNC: 0.86 MG/DL (ref 0.57–1)
DEPRECATED RDW RBC AUTO: 40.8 FL (ref 37–54)
EGFRCR SERPLBLD CKD-EPI 2021: 69.7 ML/MIN/1.73
EOSINOPHIL # BLD AUTO: 0.08 10*3/MM3 (ref 0–0.4)
EOSINOPHIL NFR BLD AUTO: 1.4 % (ref 0.3–6.2)
ERYTHROCYTE [DISTWIDTH] IN BLOOD BY AUTOMATED COUNT: 12.6 % (ref 12.3–15.4)
GLOBULIN UR ELPH-MCNC: 2.3 GM/DL
GLUCOSE SERPL-MCNC: 99 MG/DL (ref 65–99)
HCT VFR BLD AUTO: 44.6 % (ref 34–46.6)
HDLC SERPL-MCNC: 71 MG/DL (ref 40–60)
HGB BLD-MCNC: 16 G/DL (ref 12–15.9)
IMM GRANULOCYTES # BLD AUTO: 0.01 10*3/MM3 (ref 0–0.05)
IMM GRANULOCYTES NFR BLD AUTO: 0.2 % (ref 0–0.5)
LDLC SERPL CALC-MCNC: 100 MG/DL (ref 0–100)
LDLC/HDLC SERPL: 1.4 {RATIO}
LYMPHOCYTES # BLD AUTO: 1.82 10*3/MM3 (ref 0.7–3.1)
LYMPHOCYTES NFR BLD AUTO: 31.8 % (ref 19.6–45.3)
MCH RBC QN AUTO: 31.9 PG (ref 26.6–33)
MCHC RBC AUTO-ENTMCNC: 35.9 G/DL (ref 31.5–35.7)
MCV RBC AUTO: 89 FL (ref 79–97)
MONOCYTES # BLD AUTO: 0.46 10*3/MM3 (ref 0.1–0.9)
MONOCYTES NFR BLD AUTO: 8 % (ref 5–12)
NEUTROPHILS NFR BLD AUTO: 3.32 10*3/MM3 (ref 1.7–7)
NEUTROPHILS NFR BLD AUTO: 58.1 % (ref 42.7–76)
NRBC BLD AUTO-RTO: 0 /100 WBC (ref 0–0.2)
PLATELET # BLD AUTO: 332 10*3/MM3 (ref 140–450)
PMV BLD AUTO: 9.8 FL (ref 6–12)
POTASSIUM SERPL-SCNC: 4.2 MMOL/L (ref 3.5–5.2)
PROT SERPL-MCNC: 6.4 G/DL (ref 6–8.5)
RBC # BLD AUTO: 5.01 10*6/MM3 (ref 3.77–5.28)
SODIUM SERPL-SCNC: 143 MMOL/L (ref 136–145)
TRIGL SERPL-MCNC: 67 MG/DL (ref 0–150)
TSH SERPL DL<=0.05 MIU/L-ACNC: 2.66 UIU/ML (ref 0.27–4.2)
VIT B12 BLD-MCNC: 246 PG/ML (ref 211–946)
VLDLC SERPL-MCNC: 13 MG/DL (ref 5–40)
WBC NRBC COR # BLD: 5.72 10*3/MM3 (ref 3.4–10.8)

## 2022-07-22 PROCEDURE — 36415 COLL VENOUS BLD VENIPUNCTURE: CPT | Performed by: INTERNAL MEDICINE

## 2022-07-22 PROCEDURE — 82607 VITAMIN B-12: CPT | Performed by: INTERNAL MEDICINE

## 2022-07-22 PROCEDURE — 84443 ASSAY THYROID STIM HORMONE: CPT | Performed by: INTERNAL MEDICINE

## 2022-07-22 PROCEDURE — 80061 LIPID PANEL: CPT | Performed by: INTERNAL MEDICINE

## 2022-07-22 PROCEDURE — 82306 VITAMIN D 25 HYDROXY: CPT | Performed by: INTERNAL MEDICINE

## 2022-07-22 PROCEDURE — 80053 COMPREHEN METABOLIC PANEL: CPT | Performed by: INTERNAL MEDICINE

## 2022-07-22 PROCEDURE — 85025 COMPLETE CBC W/AUTO DIFF WBC: CPT | Performed by: INTERNAL MEDICINE

## 2022-07-27 ENCOUNTER — OFFICE VISIT (OUTPATIENT)
Dept: ENDOCRINOLOGY | Facility: CLINIC | Age: 77
End: 2022-07-27

## 2022-07-27 VITALS
WEIGHT: 176 LBS | DIASTOLIC BLOOD PRESSURE: 70 MMHG | OXYGEN SATURATION: 97 % | BODY MASS INDEX: 28.28 KG/M2 | HEART RATE: 85 BPM | SYSTOLIC BLOOD PRESSURE: 138 MMHG | HEIGHT: 66 IN

## 2022-07-27 DIAGNOSIS — M81.0 OSTEOPOROSIS, UNSPECIFIED OSTEOPOROSIS TYPE, UNSPECIFIED PATHOLOGICAL FRACTURE PRESENCE: ICD-10-CM

## 2022-07-27 DIAGNOSIS — E03.8 HYPOTHYROIDISM DUE TO HASHIMOTO'S THYROIDITIS: Primary | ICD-10-CM

## 2022-07-27 DIAGNOSIS — E55.9 VITAMIN D DEFICIENCY: ICD-10-CM

## 2022-07-27 DIAGNOSIS — E53.8 LOW SERUM VITAMIN B12: ICD-10-CM

## 2022-07-27 DIAGNOSIS — E06.3 HYPOTHYROIDISM DUE TO HASHIMOTO'S THYROIDITIS: Primary | ICD-10-CM

## 2022-07-27 PROCEDURE — 99214 OFFICE O/P EST MOD 30 MIN: CPT | Performed by: INTERNAL MEDICINE

## 2022-07-27 RX ORDER — GREEN TEA/HOODIA GORDONII 315-12.5MG
CAPSULE ORAL
Qty: 15 TABLET | Refills: 11
Start: 2022-07-27

## 2022-07-27 RX ORDER — OMEPRAZOLE 40 MG/1
40 CAPSULE, DELAYED RELEASE ORAL DAILY
COMMUNITY
End: 2022-10-03 | Stop reason: SDUPTHER

## 2022-07-27 RX ORDER — LEVOTHYROXINE SODIUM 88 UG/1
TABLET ORAL
Qty: 96 TABLET | Refills: 3 | Status: SHIPPED | OUTPATIENT
Start: 2022-07-27 | End: 2023-02-23 | Stop reason: SDUPTHER

## 2022-07-27 RX ORDER — CHOLECALCIFEROL (VITAMIN D3) 1250 MCG
50000 CAPSULE ORAL
Qty: 12 CAPSULE | Refills: 11 | Status: SHIPPED | OUTPATIENT
Start: 2022-07-27 | End: 2023-02-23 | Stop reason: SDUPTHER

## 2022-07-27 NOTE — PATIENT INSTRUCTIONS
Component      Latest Ref Rng & Units 1/19/2022 7/22/2022   WBC      3.40 - 10.80 10*3/mm3 4.85 5.72   RBC      3.77 - 5.28 10*6/mm3 5.14 5.01   Hemoglobin      12.0 - 15.9 g/dL 15.7 16.0 (H)   Hematocrit      34.0 - 46.6 % 45.3 44.6   MCV      79.0 - 97.0 fL 88.1 89.0   MCH      26.6 - 33.0 pg 30.5 31.9   MCHC      31.5 - 35.7 g/dL 34.7 35.9 (H)   RDW      12.3 - 15.4 % 11.9 (L) 12.6   RDW-SD      37.0 - 54.0 fl 37.7 40.8   MPV      6.0 - 12.0 fL 10.3 9.8   Platelets      140 - 450 10*3/mm3 305 332   Neutrophil Rel %      42.7 - 76.0 % 55.9 58.1   Lymphocyte Rel %      19.6 - 45.3 % 30.9 31.8   Monocyte Rel %      5.0 - 12.0 % 8.7 8.0   Eosinophil Rel %      0.3 - 6.2 % 3.7 1.4   Basophil Rel %      0.0 - 1.5 % 0.6 0.5   Immature Granulocyte Rel %      0.0 - 0.5 % 0.2 0.2   Neutrophils Absolute      1.70 - 7.00 10*3/mm3 2.71 3.32   Lymphocytes Absolute      0.70 - 3.10 10*3/mm3 1.50 1.82   Monocytes Absolute      0.10 - 0.90 10*3/mm3 0.42 0.46   Eosinophils Absolute      0.00 - 0.40 10*3/mm3 0.18 0.08   Basophils Absolute      0.00 - 0.20 10*3/mm3 0.03 0.03   Immature Grans, Absolute      0.00 - 0.05 10*3/mm3 0.01 0.01   nRBC      0.0 - 0.2 /100 WBC 0.0 0.0   Glucose      65 - 99 mg/dL 98 99   BUN      8 - 23 mg/dL 13 13   Creatinine      0.57 - 1.00 mg/dL 0.84 0.86   Sodium      136 - 145 mmol/L 142 143   Potassium      3.5 - 5.2 mmol/L 4.2 4.2   Chloride      98 - 107 mmol/L 105 106   CO2      22.0 - 29.0 mmol/L 24.8 31.0 (H)   Calcium      8.6 - 10.5 mg/dL 9.6 9.9   Total Protein      6.0 - 8.5 g/dL 6.3 6.4   Albumin      3.50 - 5.20 g/dL 4.30 4.10   ALT (SGPT)      1 - 33 U/L 14 14   AST (SGOT)      1 - 32 U/L 18 18   Alkaline Phosphatase      39 - 117 U/L 115 112   Total Bilirubin      0.0 - 1.2 mg/dL 0.5 0.5   eGFR Non African Am      >60 mL/min/1.73 66    Globulin      gm/dL 2.0 2.3   A/G Ratio      g/dL 2.2 1.8   BUN/Creatinine Ratio      7.0 - 25.0 15.5 15.1   Anion Gap      5.0 - 15.0 mmol/L 12.2 6.0    eGFR      >60.0 mL/min/1.73  69.7   Total Cholesterol      0 - 200 mg/dL 173 184   Triglycerides      0 - 150 mg/dL 80 67   HDL Cholesterol      40 - 60 mg/dL 70 (H) 71 (H)   LDL Cholesterol       0 - 100 mg/dL 88 100   VLDL Cholesterol      5 - 40 mg/dL 15 13   LDL/HDL Ratio       1.24 1.40   Magnesium      1.6 - 2.4 mg/dL 2.1    TSH Baseline      0.270 - 4.200 uIU/mL 2.890 2.660   25 Hydroxy, Vitamin D      30.0 - 100.0 ng/ml 77.0 77.3   Vitamin B-12      211 - 946 pg/mL  246

## 2022-07-27 NOTE — PROGRESS NOTES
"Chief Complaint  Hypothyroidism                                            Thyroid Problem    Hypothyroidism    Hashimoto's Thyroiditis           reason - hypothyroidism     duration - March 2013     alleviating factors - levothyroxine     symptoms - hair falling and brittle nails - improved     --     thyroid nodule  personal interpretation , pseudonodule     Feb 2015 compared to Nov 2016   my interpretation - pseudonodules   radiologist interpreation - slight increase in size from 9 mm to 1 cm of largest nodule                ---     Ostepenia    On vit D and calcium      h o  jaw fracture,      Her only present problem is GERD      Objective   Vital Signs:   /70   Pulse 85   Ht 167.6 cm (66\")   Wt 79.8 kg (176 lb)   SpO2 97%   BMI 28.41 kg/m²     Physical Exam  Constitutional:       Appearance: Normal appearance.   HENT:      Head: Normocephalic.   Cardiovascular:      Rate and Rhythm: Normal rate and regular rhythm.   Pulmonary:      Effort: Pulmonary effort is normal.      Breath sounds: Normal breath sounds.   Musculoskeletal:      Cervical back: Normal range of motion and neck supple.      Right lower leg: No edema.      Left lower leg: No edema.   Neurological:      Mental Status: She is alert.      mild  RLE    Result Review :   The following data was reviewed by: Maximino Paige MD on 01/25/2021:  Common labs    Common Labsle 1/19/22 1/19/22 1/19/22 7/22/22 7/22/22 7/22/22    0753 0753 0753 0710 0710 0710   Glucose  98   99    BUN  13   13    Creatinine  0.84   0.86    eGFR Non African Am  66       Sodium  142   143    Potassium  4.2   4.2    Chloride  105   106    Calcium  9.6   9.9    Albumin  4.30   4.10    Total Bilirubin  0.5   0.5    Alkaline Phosphatase  115   112    AST (SGOT)  18   18    ALT (SGPT)  14   14    WBC 4.85   5.72     Hemoglobin 15.7   16.0 (A)     Hematocrit 45.3   44.6     Platelets 305   332     Total Cholesterol   173   184   Triglycerides   80   67   HDL " Cholesterol   70 (A)   71 (A)   LDL Cholesterol    88   100   (A) Abnormal value              Thyroid Workup    Lab Results   Component Value Date    TSH 2.660 07/22/2022    TSH 2.890 01/19/2022       Lab Results   Component Value Date    FREET4 1.23 04/15/2015    FREET4 1.48 02/26/2015       No results found for: T3FREE    TPO antibodies    Lab Results   Component Value Date    THYROIDAB 25.1 (H) 02/04/2014       TSI antibodies    No results found for: THYRSTIMIMMU    --------------------------    Lab Results   Component Value Date    RSQLRDXH11 246 07/22/2022       ---------------------------    Lab Results   Component Value Date    KFFB28QO 77.3 07/22/2022    ZVFZ24UX 77.0 01/19/2022                              Assessment and Plan    Problem List Items Addressed This Visit        Other    Hypothyroidism due to Hashimoto's thyroiditis - Primary    Vitamin D deficiency      Other Visit Diagnoses     Osteoporosis, unspecified osteoporosis type, unspecified pathological fracture presence                      Hypothyroidism     on Levothyroxine 88 mcgs , 6.5 tabs per week - one hour before supper or at bedtime     Please go back to daily - increase to 7.5     Also on nexium - now dexilant ( changed to pepcid ) and ca + vit D - in a.m.      Low normal b12 - , on PPI , start 500 mcgs sublingual     Magnesium, monitor on future appts               ---     Osteopenia    COMPARISON: DEXA 5/30/2018     FINDINGS:      PA Spine L1-L4         Density: 0.834 g/cm2  T-score: -1.9   Z-score: 0.5     2.3% decrease from most recent prior.     Mean Bilateral Femoral Neck            Density: 0.753 g/cm2  T-score: -0.9   Z-score: 1.3           on 600 D + 400 Calcium     add extra 1000 u daily - stop      Add 50 th u every 2 weeks - every week     Last bone density 8-2020     I suggested to skip on 8 -22 scan , lets' do in 2023      ---     Pseudonodules last US from June 2015 compared to Nov 2016      Repeat and stable , repeat only  if clinically there is a palpable nodule      --     on nexium - now on dexilant - now pepcid   she rightufully requests for Mg assessment and nl      --     Sec polycythemia     reasses     If persistent do sleep study - resolved           Right carotid bruit, carotid US done ,    IMPRESSION:  1. There is 50-70% stenosis in the distal right ICA by peak  velocity criteria without significant plaque appreciated. This  may be due to tortuosity.      2. No significant atherosclerotic plaque       Reassess cholesterol   At goal     Mild RLE , use compression stockings

## 2022-10-03 ENCOUNTER — OFFICE VISIT (OUTPATIENT)
Dept: GASTROENTEROLOGY | Facility: CLINIC | Age: 77
End: 2022-10-03

## 2022-10-03 VITALS
HEART RATE: 70 BPM | OXYGEN SATURATION: 96 % | SYSTOLIC BLOOD PRESSURE: 162 MMHG | DIASTOLIC BLOOD PRESSURE: 96 MMHG | BODY MASS INDEX: 28.38 KG/M2 | WEIGHT: 176.6 LBS | HEIGHT: 66 IN

## 2022-10-03 DIAGNOSIS — K44.9 HIATAL HERNIA: ICD-10-CM

## 2022-10-03 DIAGNOSIS — R10.10 PAIN OF UPPER ABDOMEN: ICD-10-CM

## 2022-10-03 DIAGNOSIS — K21.00 GASTROESOPHAGEAL REFLUX DISEASE WITH ESOPHAGITIS WITHOUT HEMORRHAGE: Primary | ICD-10-CM

## 2022-10-03 PROCEDURE — 99213 OFFICE O/P EST LOW 20 MIN: CPT | Performed by: PHYSICIAN ASSISTANT

## 2022-10-03 RX ORDER — OMEPRAZOLE 40 MG/1
40 CAPSULE, DELAYED RELEASE ORAL DAILY
Qty: 90 CAPSULE | Refills: 1 | Status: SHIPPED | OUTPATIENT
Start: 2022-10-03 | End: 2023-04-03 | Stop reason: SDUPTHER

## 2022-10-03 NOTE — PROGRESS NOTES
Chief Complaint   Patient presents with   • Heartburn   • Hiatal Hernia   • Abdominal Pain     Last Office Visit: 04/07/22       ENDO PROCEDURE ORDERED:    Subjective    Akiko Blackwell is a 77 y.o. female. she is here today for follow-up.    History of Present Illness    Patient seen on a recheck of her GERD, abdominal pain. Last seen 04/07/2022. Was given Prilosec. She states as long as she is careful with her diet and takes the Prilosec she is doing well. She did not try the Carafate. She denied dysphagia. Bowels are moving without blood or mucus. Weight is down 3-1/2 pounds since last visit. Last EGD/colonoscopy 06/30/2021 showed gastric polyps, gastritis, diverticulosis, hemorrhoids.     Laboratory 07/22/2022 showed normal vitamin D, cholesterol, TSH. B12 was low normal at 246. CMP showed a CO2 of 31, otherwise normal.  CBC showed a hemoglobin of 16.     A/P: Patient with chronic GERD, abdominal pain, appears stable on current regimen. Encouraged to continue dietary modification and weight loss. Last LFTs are normal. Blood pressure was a little bit elevated today. She was encouraged to follow-up with her primary care. Will plan follow-up in 6 months, sooner if needed.         The following portions of the patient's history were reviewed and updated as appropriate:   Past Medical History:   Diagnosis Date   • Allergic rhinitis    • Atrophic vaginitis    • Breast cyst    • Diverticular disease of colon    • Esophagitis    • Family history of malignant neoplasm of gastrointestinal tract    • GERD (gastroesophageal reflux disease)    • Multiple gastric polyps 01/07/2020   • Osteopenia    • Subclinical hypothyroidism    • Thyroid nodule    • Vitamin D deficiency      Past Surgical History:   Procedure Laterality Date   • BREAST BIOPSY      BX BREAST PERCUT W/O IMAGE 70874 (1)   x2   • CERVIX SURGERY  05/15/1973    Conization biopsy of cervix. Cervical cautery.   • COLONOSCOPY  06/20/2016    Diverticulosis in  the sigmoid colon and in the descending colon.External and internal hemorrhoids.No specimens collected.   • COLONOSCOPY N/A 2021    Procedure: COLONOSCOPY;  Surgeon: Alfonzo Wahl MD;  Location: [facility] ENDOSCOPY;  Service: Gastroenterology;  Laterality: N/A;   • DILATATION AND CURETTAGE  10/10/1990    Fractional   • ENDOSCOPY N/A 2020    Procedure: ESOPHAGOGASTRODUODENOSCOPY WITH DILATATION--possible;  Surgeon: Alfonzo Wahl MD;  Location: [facility] ENDOSCOPY;  Service: Gastroenterology   • ENDOSCOPY N/A 2021    Procedure: ESOPHAGOGASTRODUODENOSCOPY;  Surgeon: Alfonzo Wahl MD;  Location: [facility] ENDOSCOPY;  Service: Gastroenterology;  Laterality: N/A;   • ENDOSCOPY W/ PEG TUBE PLACEMENT  2012    Esophagitis seen. Biopsy taken. Gastritis in stomach. Biopsy taken. Hiatus hernia in stomach. Normal duodenum. Biopsy taken.   • EXCISION LESION  1967    Excision of inclusion cyst, vagina.   • TONSILLECTOMY  01/15/1964    Recurring acute tonsillitis.   • UPPER GASTROINTESTINAL ENDOSCOPY  2012   • UPPER GASTROINTESTINAL ENDOSCOPY  2020   • UPPER GASTROINTESTINAL ENDOSCOPY  2021     Family History   Problem Relation Age of Onset   • Thyroid disease Mother    • Cancer Mother    • Ovarian cancer Mother         Onset age 70-74 years.   • Cancer Brother         Colorectal Cancer, onset age 70-74 years.   • Colon cancer Brother    • Diabetes Other      OB History        2    Para   2    Term   2            AB        Living           SAB        IAB        Ectopic        Molar        Multiple        Live Births                  No Known Allergies  Social History     Socioeconomic History   • Marital status:    Tobacco Use   • Smoking status: Former   • Smokeless tobacco: Never   Vaping Use   • Vaping Use: Never used   Substance and Sexual Activity   • Alcohol use: No   • Drug use: No   • Sexual activity: Defer     Current Medications:  Prior to Admission  "medications    Medication Sig Start Date End Date Taking? Authorizing Provider   calcium carbonate (TUMS ULTRA) 1000 MG chewable tablet Chew 1 tablet Daily.   Yes Elmo Hansen MD   Cholecalciferol (Vitamin D3) 1.25 MG (59836 UT) capsule Take 1 capsule by mouth Every 7 (Seven) Days. 7/27/22  Yes Maximino Marina MD   Cyanocobalamin (B-12) 500 MCG sublingual tablet 500 mcgs under the tongue every other day 7/27/22  Yes Maximino Marina MD   levothyroxine (SYNTHROID, LEVOTHROID) 88 MCG tablet Take 1 tab daily Monday to Saturday and 1.5 tabs on Sunday 7/27/22  Yes Maximino Marina MD   multivitamin with minerals tablet tablet Take 1 tablet by mouth Daily. Vitamin C , Vitamin E , biotin, collagen   Yes Elmo Hansen MD   omeprazole (priLOSEC) 40 MG capsule Take 40 mg by mouth Daily.   Yes Elmo Hansen MD     Review of Systems  Review of Systems       Objective    /96 (BP Location: Left arm, Patient Position: Sitting, Cuff Size: Small Adult)   Pulse 70   Ht 167.6 cm (66\")   Wt 80.1 kg (176 lb 9.6 oz)   LMP  (LMP Unknown)   SpO2 96%   BMI 28.50 kg/m²   Physical Exam  Vitals and nursing note reviewed.   Constitutional:       General: She is not in acute distress.     Appearance: She is well-developed.   HENT:      Head: Normocephalic and atraumatic.   Eyes:      Pupils: Pupils are equal, round, and reactive to light.   Cardiovascular:      Rate and Rhythm: Normal rate and regular rhythm.      Heart sounds: Normal heart sounds.   Pulmonary:      Effort: Pulmonary effort is normal.      Breath sounds: Normal breath sounds.   Abdominal:      General: Bowel sounds are normal. There is no distension or abdominal bruit.      Palpations: Abdomen is soft. Abdomen is not rigid. There is no shifting dullness or mass.      Tenderness: There is abdominal tenderness. There is no guarding or rebound.      Hernia: No hernia is present. There is no hernia in the ventral area. "   Musculoskeletal:         General: Normal range of motion.      Cervical back: Normal range of motion.   Skin:     General: Skin is warm and dry.   Neurological:      Mental Status: She is alert and oriented to person, place, and time.   Psychiatric:         Behavior: Behavior normal.         Thought Content: Thought content normal.         Judgment: Judgment normal.       Assessment & Plan      1. Gastroesophageal reflux disease with esophagitis without hemorrhage    2. Hiatal hernia    3. Pain of upper abdomen    .   Diagnoses and all orders for this visit:    1. Gastroesophageal reflux disease with esophagitis without hemorrhage (Primary)    2. Hiatal hernia    3. Pain of upper abdomen    Other orders  -     omeprazole (priLOSEC) 40 MG capsule; Take 1 capsule by mouth Daily.  Dispense: 90 capsule; Refill: 1        Orders placed during this encounter include:  No orders of the defined types were placed in this encounter.      Medications prescribed:  New Medications Ordered This Visit   Medications   • omeprazole (priLOSEC) 40 MG capsule     Sig: Take 1 capsule by mouth Daily.     Dispense:  90 capsule     Refill:  1       Requested Prescriptions     Signed Prescriptions Disp Refills   • omeprazole (priLOSEC) 40 MG capsule 90 capsule 1     Sig: Take 1 capsule by mouth Daily.       Review and/or summary of lab tests, radiology, procedures, medications. Review and summary of old records and obtaining of history. The risks and benefits of my recommendations, as well as other treatment options were discussed with the patient today. Questions were answered.    Follow-up: Return in about 6 months (around 4/3/2023), or if symptoms worsen or fail to improve.     * Surgery not found *      This document has been electronically signed by Henry Caballero PA-C on October 28, 2022 15:30 CDT      Results for orders placed or performed in visit on 01/27/22   CBC Auto Differential    Specimen: Blood   Result Value Ref Range     WBC 5.72 3.40 - 10.80 10*3/mm3    RBC 5.01 3.77 - 5.28 10*6/mm3    Hemoglobin 16.0 (H) 12.0 - 15.9 g/dL    Hematocrit 44.6 34.0 - 46.6 %    MCV 89.0 79.0 - 97.0 fL    MCH 31.9 26.6 - 33.0 pg    MCHC 35.9 (H) 31.5 - 35.7 g/dL    RDW 12.6 12.3 - 15.4 %    RDW-SD 40.8 37.0 - 54.0 fl    MPV 9.8 6.0 - 12.0 fL    Platelets 332 140 - 450 10*3/mm3    Neutrophil % 58.1 42.7 - 76.0 %    Lymphocyte % 31.8 19.6 - 45.3 %    Monocyte % 8.0 5.0 - 12.0 %    Eosinophil % 1.4 0.3 - 6.2 %    Basophil % 0.5 0.0 - 1.5 %    Immature Grans % 0.2 0.0 - 0.5 %    Neutrophils, Absolute 3.32 1.70 - 7.00 10*3/mm3    Lymphocytes, Absolute 1.82 0.70 - 3.10 10*3/mm3    Monocytes, Absolute 0.46 0.10 - 0.90 10*3/mm3    Eosinophils, Absolute 0.08 0.00 - 0.40 10*3/mm3    Basophils, Absolute 0.03 0.00 - 0.20 10*3/mm3    Immature Grans, Absolute 0.01 0.00 - 0.05 10*3/mm3    nRBC 0.0 0.0 - 0.2 /100 WBC   Vitamin D 25 Hydroxy    Specimen: Blood   Result Value Ref Range    25 Hydroxy, Vitamin D 77.3 30.0 - 100.0 ng/ml   TSH    Specimen: Blood   Result Value Ref Range    TSH 2.660 0.270 - 4.200 uIU/mL   Vitamin B12    Specimen: Blood   Result Value Ref Range    Vitamin B-12 246 211 - 946 pg/mL   Lipid Panel    Specimen: Blood   Result Value Ref Range    Total Cholesterol 184 0 - 200 mg/dL    Triglycerides 67 0 - 150 mg/dL    HDL Cholesterol 71 (H) 40 - 60 mg/dL    LDL Cholesterol  100 0 - 100 mg/dL    VLDL Cholesterol 13 5 - 40 mg/dL    LDL/HDL Ratio 1.40    Comprehensive Metabolic Panel    Specimen: Blood   Result Value Ref Range    Glucose 99 65 - 99 mg/dL    BUN 13 8 - 23 mg/dL    Creatinine 0.86 0.57 - 1.00 mg/dL    Sodium 143 136 - 145 mmol/L    Potassium 4.2 3.5 - 5.2 mmol/L    Chloride 106 98 - 107 mmol/L    CO2 31.0 (H) 22.0 - 29.0 mmol/L    Calcium 9.9 8.6 - 10.5 mg/dL    Total Protein 6.4 6.0 - 8.5 g/dL    Albumin 4.10 3.50 - 5.20 g/dL    ALT (SGPT) 14 1 - 33 U/L    AST (SGOT) 18 1 - 32 U/L    Alkaline Phosphatase 112 39 - 117 U/L    Total  Bilirubin 0.5 0.0 - 1.2 mg/dL    Globulin 2.3 gm/dL    A/G Ratio 1.8 g/dL    BUN/Creatinine Ratio 15.1 7.0 - 25.0    Anion Gap 6.0 5.0 - 15.0 mmol/L    eGFR 69.7 >60.0 mL/min/1.73   Results for orders placed or performed in visit on 07/26/21   CBC Auto Differential    Specimen: Blood   Result Value Ref Range    WBC 4.85 3.40 - 10.80 10*3/mm3    RBC 5.14 3.77 - 5.28 10*6/mm3    Hemoglobin 15.7 12.0 - 15.9 g/dL    Hematocrit 45.3 34.0 - 46.6 %    MCV 88.1 79.0 - 97.0 fL    MCH 30.5 26.6 - 33.0 pg    MCHC 34.7 31.5 - 35.7 g/dL    RDW 11.9 (L) 12.3 - 15.4 %    RDW-SD 37.7 37.0 - 54.0 fl    MPV 10.3 6.0 - 12.0 fL    Platelets 305 140 - 450 10*3/mm3    Neutrophil % 55.9 42.7 - 76.0 %    Lymphocyte % 30.9 19.6 - 45.3 %    Monocyte % 8.7 5.0 - 12.0 %    Eosinophil % 3.7 0.3 - 6.2 %    Basophil % 0.6 0.0 - 1.5 %    Immature Grans % 0.2 0.0 - 0.5 %    Neutrophils, Absolute 2.71 1.70 - 7.00 10*3/mm3    Lymphocytes, Absolute 1.50 0.70 - 3.10 10*3/mm3    Monocytes, Absolute 0.42 0.10 - 0.90 10*3/mm3    Eosinophils, Absolute 0.18 0.00 - 0.40 10*3/mm3    Basophils, Absolute 0.03 0.00 - 0.20 10*3/mm3    Immature Grans, Absolute 0.01 0.00 - 0.05 10*3/mm3    nRBC 0.0 0.0 - 0.2 /100 WBC   Vitamin D 25 Hydroxy    Specimen: Blood   Result Value Ref Range    25 Hydroxy, Vitamin D 77.0 30.0 - 100.0 ng/ml   TSH    Specimen: Blood   Result Value Ref Range    TSH 2.890 0.270 - 4.200 uIU/mL   Magnesium    Specimen: Blood   Result Value Ref Range    Magnesium 2.1 1.6 - 2.4 mg/dL   Lipid Panel    Specimen: Blood   Result Value Ref Range    Total Cholesterol 173 0 - 200 mg/dL    Triglycerides 80 0 - 150 mg/dL    HDL Cholesterol 70 (H) 40 - 60 mg/dL    LDL Cholesterol  88 0 - 100 mg/dL    VLDL Cholesterol 15 5 - 40 mg/dL    LDL/HDL Ratio 1.24    Comprehensive Metabolic Panel    Specimen: Blood   Result Value Ref Range    Glucose 98 65 - 99 mg/dL    BUN 13 8 - 23 mg/dL    Creatinine 0.84 0.57 - 1.00 mg/dL    Sodium 142 136 - 145 mmol/L     Potassium 4.2 3.5 - 5.2 mmol/L    Chloride 105 98 - 107 mmol/L    CO2 24.8 22.0 - 29.0 mmol/L    Calcium 9.6 8.6 - 10.5 mg/dL    Total Protein 6.3 6.0 - 8.5 g/dL    Albumin 4.30 3.50 - 5.20 g/dL    ALT (SGPT) 14 1 - 33 U/L    AST (SGOT) 18 1 - 32 U/L    Alkaline Phosphatase 115 39 - 117 U/L    Total Bilirubin 0.5 0.0 - 1.2 mg/dL    eGFR Non African Amer 66 >60 mL/min/1.73    Globulin 2.0 gm/dL    A/G Ratio 2.2 g/dL    BUN/Creatinine Ratio 15.5 7.0 - 25.0    Anion Gap 12.2 5.0 - 15.0 mmol/L     *Note: Due to a large number of results and/or encounters for the requested time period, some results have not been displayed. A complete set of results can be found in Results Review.

## 2022-12-20 ENCOUNTER — TELEPHONE (OUTPATIENT)
Dept: ENDOCRINOLOGY | Facility: CLINIC | Age: 77
End: 2022-12-20

## 2023-02-15 ENCOUNTER — LAB (OUTPATIENT)
Dept: LAB | Facility: HOSPITAL | Age: 78
End: 2023-02-15
Payer: MEDICARE

## 2023-02-15 LAB
25(OH)D3 SERPL-MCNC: 76.8 NG/ML (ref 30–100)
ALBUMIN SERPL-MCNC: 4.2 G/DL (ref 3.5–5.2)
ALBUMIN/GLOB SERPL: 1.9 G/DL
ALP SERPL-CCNC: 106 U/L (ref 39–117)
ALT SERPL W P-5'-P-CCNC: 13 U/L (ref 1–33)
ANION GAP SERPL CALCULATED.3IONS-SCNC: 6 MMOL/L (ref 5–15)
AST SERPL-CCNC: 16 U/L (ref 1–32)
BASOPHILS # BLD AUTO: 0.05 10*3/MM3 (ref 0–0.2)
BASOPHILS NFR BLD AUTO: 0.8 % (ref 0–1.5)
BILIRUB SERPL-MCNC: 0.6 MG/DL (ref 0–1.2)
BUN SERPL-MCNC: 13 MG/DL (ref 8–23)
BUN/CREAT SERPL: 17.1 (ref 7–25)
CALCIUM SPEC-SCNC: 9.6 MG/DL (ref 8.6–10.5)
CHLORIDE SERPL-SCNC: 105 MMOL/L (ref 98–107)
CO2 SERPL-SCNC: 30 MMOL/L (ref 22–29)
CREAT SERPL-MCNC: 0.76 MG/DL (ref 0.57–1)
DEPRECATED RDW RBC AUTO: 39.9 FL (ref 37–54)
EGFRCR SERPLBLD CKD-EPI 2021: 80.3 ML/MIN/1.73
EOSINOPHIL # BLD AUTO: 0.1 10*3/MM3 (ref 0–0.4)
EOSINOPHIL NFR BLD AUTO: 1.7 % (ref 0.3–6.2)
ERYTHROCYTE [DISTWIDTH] IN BLOOD BY AUTOMATED COUNT: 12.4 % (ref 12.3–15.4)
GLOBULIN UR ELPH-MCNC: 2.2 GM/DL
GLUCOSE SERPL-MCNC: 97 MG/DL (ref 65–99)
HCT VFR BLD AUTO: 44.7 % (ref 34–46.6)
HGB BLD-MCNC: 15.3 G/DL (ref 12–15.9)
IMM GRANULOCYTES # BLD AUTO: 0.01 10*3/MM3 (ref 0–0.05)
IMM GRANULOCYTES NFR BLD AUTO: 0.2 % (ref 0–0.5)
LYMPHOCYTES # BLD AUTO: 1.89 10*3/MM3 (ref 0.7–3.1)
LYMPHOCYTES NFR BLD AUTO: 31.6 % (ref 19.6–45.3)
MAGNESIUM SERPL-MCNC: 2.2 MG/DL (ref 1.6–2.4)
MCH RBC QN AUTO: 30 PG (ref 26.6–33)
MCHC RBC AUTO-ENTMCNC: 34.2 G/DL (ref 31.5–35.7)
MCV RBC AUTO: 87.6 FL (ref 79–97)
MONOCYTES # BLD AUTO: 0.43 10*3/MM3 (ref 0.1–0.9)
MONOCYTES NFR BLD AUTO: 7.2 % (ref 5–12)
NEUTROPHILS NFR BLD AUTO: 3.51 10*3/MM3 (ref 1.7–7)
NEUTROPHILS NFR BLD AUTO: 58.5 % (ref 42.7–76)
NRBC BLD AUTO-RTO: 0 /100 WBC (ref 0–0.2)
PLATELET # BLD AUTO: 311 10*3/MM3 (ref 140–450)
PMV BLD AUTO: 9.7 FL (ref 6–12)
POTASSIUM SERPL-SCNC: 4.3 MMOL/L (ref 3.5–5.2)
PROT SERPL-MCNC: 6.4 G/DL (ref 6–8.5)
RBC # BLD AUTO: 5.1 10*6/MM3 (ref 3.77–5.28)
SODIUM SERPL-SCNC: 141 MMOL/L (ref 136–145)
TSH SERPL DL<=0.05 MIU/L-ACNC: 2.53 UIU/ML (ref 0.27–4.2)
VIT B12 BLD-MCNC: 435 PG/ML (ref 211–946)
WBC NRBC COR # BLD: 5.99 10*3/MM3 (ref 3.4–10.8)

## 2023-02-15 PROCEDURE — 82306 VITAMIN D 25 HYDROXY: CPT | Performed by: INTERNAL MEDICINE

## 2023-02-15 PROCEDURE — 80053 COMPREHEN METABOLIC PANEL: CPT | Performed by: INTERNAL MEDICINE

## 2023-02-15 PROCEDURE — 84443 ASSAY THYROID STIM HORMONE: CPT | Performed by: INTERNAL MEDICINE

## 2023-02-15 PROCEDURE — 83735 ASSAY OF MAGNESIUM: CPT | Performed by: INTERNAL MEDICINE

## 2023-02-15 PROCEDURE — 85025 COMPLETE CBC W/AUTO DIFF WBC: CPT | Performed by: INTERNAL MEDICINE

## 2023-02-15 PROCEDURE — 82607 VITAMIN B-12: CPT | Performed by: INTERNAL MEDICINE

## 2023-02-15 PROCEDURE — 36415 COLL VENOUS BLD VENIPUNCTURE: CPT | Performed by: INTERNAL MEDICINE

## 2023-02-23 ENCOUNTER — OFFICE VISIT (OUTPATIENT)
Dept: ENDOCRINOLOGY | Facility: CLINIC | Age: 78
End: 2023-02-23
Payer: MEDICARE

## 2023-02-23 VITALS
WEIGHT: 179 LBS | DIASTOLIC BLOOD PRESSURE: 78 MMHG | OXYGEN SATURATION: 98 % | HEART RATE: 72 BPM | BODY MASS INDEX: 28.77 KG/M2 | HEIGHT: 66 IN | SYSTOLIC BLOOD PRESSURE: 128 MMHG

## 2023-02-23 DIAGNOSIS — E55.9 VITAMIN D DEFICIENCY: ICD-10-CM

## 2023-02-23 DIAGNOSIS — E06.3 HYPOTHYROIDISM DUE TO HASHIMOTO'S THYROIDITIS: Primary | ICD-10-CM

## 2023-02-23 DIAGNOSIS — E03.8 HYPOTHYROIDISM DUE TO HASHIMOTO'S THYROIDITIS: Primary | ICD-10-CM

## 2023-02-23 DIAGNOSIS — E83.42 HYPOMAGNESEMIA: ICD-10-CM

## 2023-02-23 DIAGNOSIS — E53.8 LOW SERUM VITAMIN B12: ICD-10-CM

## 2023-02-23 DIAGNOSIS — M85.88 OSTEOPENIA OF LUMBAR SPINE: ICD-10-CM

## 2023-02-23 PROCEDURE — 99214 OFFICE O/P EST MOD 30 MIN: CPT | Performed by: INTERNAL MEDICINE

## 2023-02-23 RX ORDER — LEVOTHYROXINE SODIUM 88 UG/1
TABLET ORAL
Qty: 96 TABLET | Refills: 3 | Status: SHIPPED | OUTPATIENT
Start: 2023-02-23

## 2023-02-23 RX ORDER — CHOLECALCIFEROL (VITAMIN D3) 1250 MCG
50000 CAPSULE ORAL
Qty: 12 CAPSULE | Refills: 11 | Status: SHIPPED | OUTPATIENT
Start: 2023-02-23

## 2023-02-23 NOTE — PROGRESS NOTES
"Chief Complaint  Hypothyroidism                                            Hypothyroidism    Hashimoto's Thyroiditis    Thyroid Problem           reason - hypothyroidism     duration - March 2013     alleviating factors - levothyroxine     symptoms - hair falling and brittle nails - improved     --     thyroid nodule  personal interpretation , pseudonodule     Feb 2015 compared to Nov 2016   my interpretation - pseudonodules   radiologist interpreation - slight increase in size from 9 mm to 1 cm of largest nodule                ---     Ostepenia    On vit D and calcium      h o  jaw fracture,      Her only present problem is GERD     Now on B12 and feels some improvement      Objective   Vital Signs:   /78   Pulse 72   Ht 167.6 cm (66\")   Wt 81.2 kg (179 lb)   SpO2 98%   BMI 28.89 kg/m²     Physical Exam  Constitutional:       Appearance: Normal appearance.   HENT:      Head: Normocephalic.   Cardiovascular:      Rate and Rhythm: Normal rate and regular rhythm.   Pulmonary:      Effort: Pulmonary effort is normal.      Breath sounds: Normal breath sounds.   Musculoskeletal:      Cervical back: Normal range of motion and neck supple.      Right lower leg: No edema.      Left lower leg: No edema.   Neurological:      Mental Status: She is alert.      mild  RLE    Result Review :   The following data was reviewed by: Maximino Paige MD on 01/25/2021:  Common labs    Common Labs 7/22/22 7/22/22 7/22/22 2/15/23 2/15/23    0710 0710 0710 0725 0725   Glucose  99   97   BUN  13   13   Creatinine  0.86   0.76   Sodium  143   141   Potassium  4.2   4.3   Chloride  106   105   Calcium  9.9   9.6   Albumin  4.10   4.2   Total Bilirubin  0.5   0.6   Alkaline Phosphatase  112   106   AST (SGOT)  18   16   ALT (SGPT)  14   13   WBC 5.72   5.99    Hemoglobin 16.0 (A)   15.3    Hematocrit 44.6   44.7    Platelets 332   311    Total Cholesterol   184     Triglycerides   67     HDL Cholesterol   71 (A)   "   LDL Cholesterol    100     (A) Abnormal value              Thyroid Workup    Lab Results   Component Value Date    TSH 2.530 02/15/2023    TSH 2.660 07/22/2022       Lab Results   Component Value Date    FREET4 1.23 04/15/2015    FREET4 1.48 02/26/2015       No results found for: T3FREE    TPO antibodies    Lab Results   Component Value Date    THYROIDAB 25.1 (H) 02/04/2014       TSI antibodies    No results found for: THYRSTIMIMMU    --------------------------    Lab Results   Component Value Date    YEHQFKNK38 435 02/15/2023       ---------------------------    Lab Results   Component Value Date    FTCI20FN 76.8 02/15/2023    LKOC52VM 77.3 07/22/2022                              Assessment and Plan       Diagnosis Plan   1. Hypothyroidism due to Hashimoto's thyroiditis  levothyroxine (SYNTHROID, LEVOTHROID) 88 MCG tablet    CBC & Differential    Comprehensive Metabolic Panel    TSH      2. Vitamin D deficiency  CBC & Differential    Comprehensive Metabolic Panel    Cholecalciferol (Vitamin D3) 1.25 MG (29248 UT) capsule      3. Low serum vitamin B12  CBC & Differential    Comprehensive Metabolic Panel    Vitamin B12      4. Osteopenia of lumbar spine  CBC & Differential    Comprehensive Metabolic Panel    Vitamin D,25-Hydroxy      5. Hypomagnesemia  Magnesium                Hypothyroidism     on Levothyroxine 88 mcgs , 6.5 tabs per week - one hour before supper or at bedtime     Please go back to daily - increase to 7.5     Also on nexium - now dexilant ( changed to pepcid ) and ca + vit D - in a.m.      Low normal b12 - , on PPI , start 500 mcgs sublingual qod , working     Magnesium, monitor on future appts               ---     Osteopenia    COMPARISON: DEXA 5/30/2018     FINDINGS:      PA Spine L1-L4         Density: 0.834 g/cm2  T-score: -1.9   Z-score: 0.5     2.3% decrease from most recent prior.     Mean Bilateral Femoral Neck            Density: 0.753 g/cm2  T-score: -0.9   Z-score: 1.3           on 600  D + 400 Calcium     add extra 1000 u daily - stop      Add 50 th u every 2 weeks - every week     Last bone density 8-2020     I suggested to skip on 8 -22 scan , lets' do in 2032     ---     Pseudonodules last US from June 2015 compared to Nov 2016      Repeat and stable , repeat only if clinically there is a palpable nodule      --     on nexium - now on dexilant - now pepcid   she rightufully requests for Mg assessment and nl      --     Sec polycythemia     reasses     If persistent do sleep study - resolved           Right carotid bruit, carotid US done ,    IMPRESSION:  1. There is 50-70% stenosis in the distal right ICA by peak  velocity criteria without significant plaque appreciated. This  may be due to tortuosity.      2. No significant atherosclerotic plaque       Reassess cholesterol   At goal     Mild RLE , use compression stockings           This document has been electronically signed by Maximino Paige MD on February 23, 2023 14:17 CST

## 2023-02-23 NOTE — PATIENT INSTRUCTIONS
Component      Latest Ref Rng & Units 2/15/2023   WBC      3.40 - 10.80 10*3/mm3 5.99   RBC      3.77 - 5.28 10*6/mm3 5.10   Hemoglobin      12.0 - 15.9 g/dL 15.3   Hematocrit      34.0 - 46.6 % 44.7   MCV      79.0 - 97.0 fL 87.6   MCH      26.6 - 33.0 pg 30.0   MCHC      31.5 - 35.7 g/dL 34.2   RDW      12.3 - 15.4 % 12.4   RDW-SD      37.0 - 54.0 fl 39.9   MPV      6.0 - 12.0 fL 9.7   Platelets      140 - 450 10*3/mm3 311   Neutrophil Rel %      42.7 - 76.0 % 58.5   Lymphocyte Rel %      19.6 - 45.3 % 31.6   Monocyte Rel %      5.0 - 12.0 % 7.2   Eosinophil Rel %      0.3 - 6.2 % 1.7   Basophil Rel %      0.0 - 1.5 % 0.8   Immature Granulocyte Rel %      0.0 - 0.5 % 0.2   Neutrophils Absolute      1.70 - 7.00 10*3/mm3 3.51   Lymphocytes Absolute      0.70 - 3.10 10*3/mm3 1.89   Monocytes Absolute      0.10 - 0.90 10*3/mm3 0.43   Eosinophils Absolute      0.00 - 0.40 10*3/mm3 0.10   Basophils Absolute      0.00 - 0.20 10*3/mm3 0.05   Immature Grans, Absolute      0.00 - 0.05 10*3/mm3 0.01   nRBC      0.0 - 0.2 /100 WBC 0.0   Glucose      65 - 99 mg/dL 97   BUN      8 - 23 mg/dL 13   Creatinine      0.57 - 1.00 mg/dL 0.76   Sodium      136 - 145 mmol/L 141   Potassium      3.5 - 5.2 mmol/L 4.3   Chloride      98 - 107 mmol/L 105   CO2      22.0 - 29.0 mmol/L 30.0 (H)   Calcium      8.6 - 10.5 mg/dL 9.6   Total Protein      6.0 - 8.5 g/dL 6.4   Albumin      3.5 - 5.2 g/dL 4.2   ALT (SGPT)      1 - 33 U/L 13   AST (SGOT)      1 - 32 U/L 16   Alkaline Phosphatase      39 - 117 U/L 106   Total Bilirubin      0.0 - 1.2 mg/dL 0.6   Globulin      gm/dL 2.2   A/G Ratio      g/dL 1.9   BUN/Creatinine Ratio      7.0 - 25.0 17.1   Anion Gap      5.0 - 15.0 mmol/L 6.0   eGFR      >60.0 mL/min/1.73 80.3   25 Hydroxy, Vitamin D      30.0 - 100.0 ng/ml 76.8   TSH Baseline      0.270 - 4.200 uIU/mL 2.530   Magnesium      1.6 - 2.4 mg/dL 2.2   Vitamin B-12      211 - 946 pg/mL 435

## 2023-04-03 ENCOUNTER — OFFICE VISIT (OUTPATIENT)
Dept: GASTROENTEROLOGY | Facility: CLINIC | Age: 78
End: 2023-04-03
Payer: MEDICARE

## 2023-04-03 VITALS
OXYGEN SATURATION: 94 % | BODY MASS INDEX: 28.96 KG/M2 | SYSTOLIC BLOOD PRESSURE: 138 MMHG | HEART RATE: 80 BPM | DIASTOLIC BLOOD PRESSURE: 82 MMHG | HEIGHT: 66 IN | WEIGHT: 180.2 LBS

## 2023-04-03 DIAGNOSIS — K44.9 HIATAL HERNIA: ICD-10-CM

## 2023-04-03 DIAGNOSIS — K21.00 GASTROESOPHAGEAL REFLUX DISEASE WITH ESOPHAGITIS WITHOUT HEMORRHAGE: Primary | ICD-10-CM

## 2023-04-03 DIAGNOSIS — R10.10 PAIN OF UPPER ABDOMEN: ICD-10-CM

## 2023-04-03 PROCEDURE — 1159F MED LIST DOCD IN RCRD: CPT | Performed by: PHYSICIAN ASSISTANT

## 2023-04-03 PROCEDURE — 1160F RVW MEDS BY RX/DR IN RCRD: CPT | Performed by: PHYSICIAN ASSISTANT

## 2023-04-03 PROCEDURE — 99213 OFFICE O/P EST LOW 20 MIN: CPT | Performed by: PHYSICIAN ASSISTANT

## 2023-04-03 RX ORDER — OMEPRAZOLE 40 MG/1
40 CAPSULE, DELAYED RELEASE ORAL DAILY
Qty: 90 CAPSULE | Refills: 1 | Status: SHIPPED | OUTPATIENT
Start: 2023-04-03

## 2023-04-03 NOTE — PROGRESS NOTES
Chief Complaint   Patient presents with   • Follow-up     6 Month   • Heartburn   • Hiatal Hernia       ENDO PROCEDURE ORDERED:    Subjective    Akiko Blackwell is a 77 y.o. female. she is here today for follow-up.    History of Present Illness    Patient seen on a recheck of her GERD, hiatal hernia, abdominal pain. Last seen 10/03/2022. She states the Prilosec 40 mg daily generally is doing well as long as she is careful with her diet and does not overdo it. She denied nausea, vomiting, dysphagia. Bowels are regular without blood or mucous. Weight is up 3.5 pounds since last visit. Last EGD/colonoscopy 06/30/2021, showed gastritis with gastric polyps, diverticulosis, hemorrhoids. Patient had laboratories 02/15/2023 showed normal CBC, vitamin D, TSH, magnesium, B12. CMP showed CO2 of 30, otherwise normal.     A/P: Patient with gastritis, hiatal hernia, previous gastric polyps. Discussed risks, benefits, alternatives. She would like to continue current medication. I refilled her Prilosec. Will plan follow-up in 6 months, sooner if needed.        The following portions of the patient's history were reviewed and updated as appropriate:   Past Medical History:   Diagnosis Date   • Allergic rhinitis    • Atrophic vaginitis    • Breast cyst    • Diverticular disease of colon    • Esophagitis    • Family history of malignant neoplasm of gastrointestinal tract    • GERD (gastroesophageal reflux disease)    • Multiple gastric polyps 01/07/2020   • Osteopenia    • Subclinical hypothyroidism    • Thyroid nodule    • Vitamin D deficiency      Past Surgical History:   Procedure Laterality Date   • BREAST BIOPSY      BX BREAST PERCUT W/O IMAGE 35246 (1)   x2   • CERVIX SURGERY  05/15/1973    Conization biopsy of cervix. Cervical cautery.   • COLONOSCOPY  06/20/2016    Diverticulosis in the sigmoid colon and in the descending colon.External and internal hemorrhoids.No specimens collected.   • COLONOSCOPY N/A 6/30/2021     Procedure: COLONOSCOPY;  Surgeon: Alfonzo Wahl MD;  Location: [facility] ENDOSCOPY;  Service: Gastroenterology;  Laterality: N/A;   • DILATATION AND CURETTAGE  10/10/1990    Fractional   • ENDOSCOPY N/A 2020    Procedure: ESOPHAGOGASTRODUODENOSCOPY WITH DILATATION--possible;  Surgeon: Alfonzo Wahl MD;  Location: [facility] ENDOSCOPY;  Service: Gastroenterology   • ENDOSCOPY N/A 2021    Procedure: ESOPHAGOGASTRODUODENOSCOPY;  Surgeon: Alfonzo Wahl MD;  Location: [facility] ENDOSCOPY;  Service: Gastroenterology;  Laterality: N/A;   • ENDOSCOPY W/ PEG TUBE PLACEMENT  2012    Esophagitis seen. Biopsy taken. Gastritis in stomach. Biopsy taken. Hiatus hernia in stomach. Normal duodenum. Biopsy taken.   • EXCISION LESION  1967    Excision of inclusion cyst, vagina.   • TONSILLECTOMY  01/15/1964    Recurring acute tonsillitis.   • UPPER GASTROINTESTINAL ENDOSCOPY  2012   • UPPER GASTROINTESTINAL ENDOSCOPY  2020   • UPPER GASTROINTESTINAL ENDOSCOPY  2021     Family History   Problem Relation Age of Onset   • Thyroid disease Mother    • Cancer Mother    • Ovarian cancer Mother         Onset age 70-74 years.   • Cancer Brother         Colorectal Cancer, onset age 70-74 years.   • Colon cancer Brother    • Diabetes Other      OB History        2    Para   2    Term   2            AB        Living           SAB        IAB        Ectopic        Molar        Multiple        Live Births                  No Known Allergies  Social History     Socioeconomic History   • Marital status:    Tobacco Use   • Smoking status: Former   • Smokeless tobacco: Never   Vaping Use   • Vaping Use: Never used   Substance and Sexual Activity   • Alcohol use: No   • Drug use: No   • Sexual activity: Defer     Current Medications:  Prior to Admission medications    Medication Sig Start Date End Date Taking? Authorizing Provider   calcium carbonate (TUMS ULTRA) 1000 MG chewable tablet Chew 1  "tablet Daily.   Yes Elmo Hansen MD   Cholecalciferol (Vitamin D3) 1.25 MG (88635 UT) capsule Take 1 capsule by mouth Every 7 (Seven) Days. 2/23/23  Yes Maximino Marina MD   Cyanocobalamin (B-12) 500 MCG sublingual tablet 500 mcgs under the tongue every other day 7/27/22  Yes Maximino Marina MD   levothyroxine (SYNTHROID, LEVOTHROID) 88 MCG tablet Take 1 tab daily Monday to Saturday and 1.5 tabs on Sunday 2/23/23  Yes Maximino Marina MD   levothyroxine (SYNTHROID, LEVOTHROID) 88 MCG tablet Daily.   Yes Elmo Hansen MD   multivitamin with minerals tablet tablet Take 1 tablet by mouth Daily. Vitamin C , Vitamin E , biotin, collagen   Yes Elmo Hansen MD   omeprazole (priLOSEC) 40 MG capsule Take 1 capsule by mouth Daily. 4/3/23  Yes Henry Caballero PA-C   omeprazole (priLOSEC) 40 MG capsule Take 1 capsule by mouth Daily. 10/3/22 4/3/23 Yes Henry Caballero PA-C   cetirizine (zyrTEC) 10 MG tablet Take 1 tablet by mouth Daily for 30 days. 11/12/22 2/28/23  Kalli Gomez APRN   esomeprazole (nexIUM) 40 MG capsule 1 capsule Daily.  Patient not taking: Reported on 4/3/2023  4/3/23  Elmo Hansen MD     Review of Systems  Review of Systems       Objective    /82 (BP Location: Right arm, Patient Position: Sitting)   Pulse 80   Ht 167.6 cm (66\")   Wt 81.7 kg (180 lb 3.2 oz)   LMP  (LMP Unknown)   SpO2 94%   BMI 29.09 kg/m²   Physical Exam  Vitals and nursing note reviewed.   Constitutional:       General: She is not in acute distress.     Appearance: She is well-developed.   HENT:      Head: Normocephalic and atraumatic.   Eyes:      Pupils: Pupils are equal, round, and reactive to light.   Cardiovascular:      Rate and Rhythm: Normal rate and regular rhythm.      Heart sounds: Normal heart sounds.   Pulmonary:      Effort: Pulmonary effort is normal.      Breath sounds: Normal breath sounds.   Abdominal:      General: Bowel sounds are normal. " There is no distension or abdominal bruit.      Palpations: Abdomen is soft. Abdomen is not rigid. There is no shifting dullness or mass.      Tenderness: There is abdominal tenderness. There is no guarding or rebound.      Hernia: No hernia is present. There is no hernia in the ventral area.   Musculoskeletal:         General: Normal range of motion.      Cervical back: Normal range of motion.   Skin:     General: Skin is warm and dry.   Neurological:      Mental Status: She is alert and oriented to person, place, and time.   Psychiatric:         Behavior: Behavior normal.         Thought Content: Thought content normal.         Judgment: Judgment normal.       Assessment & Plan      1. Gastroesophageal reflux disease with esophagitis without hemorrhage    2. Hiatal hernia    3. Pain of upper abdomen    .   Diagnoses and all orders for this visit:    1. Gastroesophageal reflux disease with esophagitis without hemorrhage (Primary)    2. Hiatal hernia    3. Pain of upper abdomen    Other orders  -     omeprazole (priLOSEC) 40 MG capsule; Take 1 capsule by mouth Daily.  Dispense: 90 capsule; Refill: 1        Orders placed during this encounter include:  No orders of the defined types were placed in this encounter.      Medications prescribed:  New Medications Ordered This Visit   Medications   • omeprazole (priLOSEC) 40 MG capsule     Sig: Take 1 capsule by mouth Daily.     Dispense:  90 capsule     Refill:  1     Discontinued Medications       Reason for Discontinue     esomeprazole (nexIUM) 40 MG capsule    *Therapy completed        Requested Prescriptions     Signed Prescriptions Disp Refills   • omeprazole (priLOSEC) 40 MG capsule 90 capsule 1     Sig: Take 1 capsule by mouth Daily.       Review and/or summary of lab tests, radiology, procedures, medications. Review and summary of old records and obtaining of history. The risks and benefits of my recommendations, as well as other treatment options were discussed  with the patient today. Questions were answered.    Follow-up: Return in about 6 months (around 10/3/2023), or if symptoms worsen or fail to improve.     * Surgery not found *      This document has been electronically signed by Henry Caballero PA-C on April 17, 2023 19:50 CDT      Results for orders placed or performed in visit on 07/27/22   CBC Auto Differential    Specimen: Blood   Result Value Ref Range    WBC 5.99 3.40 - 10.80 10*3/mm3    RBC 5.10 3.77 - 5.28 10*6/mm3    Hemoglobin 15.3 12.0 - 15.9 g/dL    Hematocrit 44.7 34.0 - 46.6 %    MCV 87.6 79.0 - 97.0 fL    MCH 30.0 26.6 - 33.0 pg    MCHC 34.2 31.5 - 35.7 g/dL    RDW 12.4 12.3 - 15.4 %    RDW-SD 39.9 37.0 - 54.0 fl    MPV 9.7 6.0 - 12.0 fL    Platelets 311 140 - 450 10*3/mm3    Neutrophil % 58.5 42.7 - 76.0 %    Lymphocyte % 31.6 19.6 - 45.3 %    Monocyte % 7.2 5.0 - 12.0 %    Eosinophil % 1.7 0.3 - 6.2 %    Basophil % 0.8 0.0 - 1.5 %    Immature Grans % 0.2 0.0 - 0.5 %    Neutrophils, Absolute 3.51 1.70 - 7.00 10*3/mm3    Lymphocytes, Absolute 1.89 0.70 - 3.10 10*3/mm3    Monocytes, Absolute 0.43 0.10 - 0.90 10*3/mm3    Eosinophils, Absolute 0.10 0.00 - 0.40 10*3/mm3    Basophils, Absolute 0.05 0.00 - 0.20 10*3/mm3    Immature Grans, Absolute 0.01 0.00 - 0.05 10*3/mm3    nRBC 0.0 0.0 - 0.2 /100 WBC   Vitamin D 25 Hydroxy    Specimen: Blood   Result Value Ref Range    25 Hydroxy, Vitamin D 76.8 30.0 - 100.0 ng/ml   TSH    Specimen: Blood   Result Value Ref Range    TSH 2.530 0.270 - 4.200 uIU/mL   Magnesium    Specimen: Blood   Result Value Ref Range    Magnesium 2.2 1.6 - 2.4 mg/dL   Vitamin B12    Specimen: Blood   Result Value Ref Range    Vitamin B-12 435 211 - 946 pg/mL   Comprehensive Metabolic Panel    Specimen: Blood   Result Value Ref Range    Glucose 97 65 - 99 mg/dL    BUN 13 8 - 23 mg/dL    Creatinine 0.76 0.57 - 1.00 mg/dL    Sodium 141 136 - 145 mmol/L    Potassium 4.3 3.5 - 5.2 mmol/L    Chloride 105 98 - 107 mmol/L    CO2 30.0 (H) 22.0  - 29.0 mmol/L    Calcium 9.6 8.6 - 10.5 mg/dL    Total Protein 6.4 6.0 - 8.5 g/dL    Albumin 4.2 3.5 - 5.2 g/dL    ALT (SGPT) 13 1 - 33 U/L    AST (SGOT) 16 1 - 32 U/L    Alkaline Phosphatase 106 39 - 117 U/L    Total Bilirubin 0.6 0.0 - 1.2 mg/dL    Globulin 2.2 gm/dL    A/G Ratio 1.9 g/dL    BUN/Creatinine Ratio 17.1 7.0 - 25.0    Anion Gap 6.0 5.0 - 15.0 mmol/L    eGFR 80.3 >60.0 mL/min/1.73   Results for orders placed or performed in visit on 01/27/22   CBC Auto Differential    Specimen: Blood   Result Value Ref Range    WBC 5.72 3.40 - 10.80 10*3/mm3    RBC 5.01 3.77 - 5.28 10*6/mm3    Hemoglobin 16.0 (H) 12.0 - 15.9 g/dL    Hematocrit 44.6 34.0 - 46.6 %    MCV 89.0 79.0 - 97.0 fL    MCH 31.9 26.6 - 33.0 pg    MCHC 35.9 (H) 31.5 - 35.7 g/dL    RDW 12.6 12.3 - 15.4 %    RDW-SD 40.8 37.0 - 54.0 fl    MPV 9.8 6.0 - 12.0 fL    Platelets 332 140 - 450 10*3/mm3    Neutrophil % 58.1 42.7 - 76.0 %    Lymphocyte % 31.8 19.6 - 45.3 %    Monocyte % 8.0 5.0 - 12.0 %    Eosinophil % 1.4 0.3 - 6.2 %    Basophil % 0.5 0.0 - 1.5 %    Immature Grans % 0.2 0.0 - 0.5 %    Neutrophils, Absolute 3.32 1.70 - 7.00 10*3/mm3    Lymphocytes, Absolute 1.82 0.70 - 3.10 10*3/mm3    Monocytes, Absolute 0.46 0.10 - 0.90 10*3/mm3    Eosinophils, Absolute 0.08 0.00 - 0.40 10*3/mm3    Basophils, Absolute 0.03 0.00 - 0.20 10*3/mm3    Immature Grans, Absolute 0.01 0.00 - 0.05 10*3/mm3    nRBC 0.0 0.0 - 0.2 /100 WBC   Vitamin D 25 Hydroxy    Specimen: Blood   Result Value Ref Range    25 Hydroxy, Vitamin D 77.3 30.0 - 100.0 ng/ml   TSH    Specimen: Blood   Result Value Ref Range    TSH 2.660 0.270 - 4.200 uIU/mL   Vitamin B12    Specimen: Blood   Result Value Ref Range    Vitamin B-12 246 211 - 946 pg/mL   Lipid Panel    Specimen: Blood   Result Value Ref Range    Total Cholesterol 184 0 - 200 mg/dL    Triglycerides 67 0 - 150 mg/dL    HDL Cholesterol 71 (H) 40 - 60 mg/dL    LDL Cholesterol  100 0 - 100 mg/dL    VLDL Cholesterol 13 5 - 40 mg/dL     LDL/HDL Ratio 1.40    Comprehensive Metabolic Panel    Specimen: Blood   Result Value Ref Range    Glucose 99 65 - 99 mg/dL    BUN 13 8 - 23 mg/dL    Creatinine 0.86 0.57 - 1.00 mg/dL    Sodium 143 136 - 145 mmol/L    Potassium 4.2 3.5 - 5.2 mmol/L    Chloride 106 98 - 107 mmol/L    CO2 31.0 (H) 22.0 - 29.0 mmol/L    Calcium 9.9 8.6 - 10.5 mg/dL    Total Protein 6.4 6.0 - 8.5 g/dL    Albumin 4.10 3.50 - 5.20 g/dL    ALT (SGPT) 14 1 - 33 U/L    AST (SGOT) 18 1 - 32 U/L    Alkaline Phosphatase 112 39 - 117 U/L    Total Bilirubin 0.5 0.0 - 1.2 mg/dL    Globulin 2.3 gm/dL    A/G Ratio 1.8 g/dL    BUN/Creatinine Ratio 15.1 7.0 - 25.0    Anion Gap 6.0 5.0 - 15.0 mmol/L    eGFR 69.7 >60.0 mL/min/1.73   Results for orders placed or performed in visit on 07/26/21   CBC Auto Differential    Specimen: Blood   Result Value Ref Range    WBC 4.85 3.40 - 10.80 10*3/mm3    RBC 5.14 3.77 - 5.28 10*6/mm3    Hemoglobin 15.7 12.0 - 15.9 g/dL    Hematocrit 45.3 34.0 - 46.6 %    MCV 88.1 79.0 - 97.0 fL    MCH 30.5 26.6 - 33.0 pg    MCHC 34.7 31.5 - 35.7 g/dL    RDW 11.9 (L) 12.3 - 15.4 %    RDW-SD 37.7 37.0 - 54.0 fl    MPV 10.3 6.0 - 12.0 fL    Platelets 305 140 - 450 10*3/mm3    Neutrophil % 55.9 42.7 - 76.0 %    Lymphocyte % 30.9 19.6 - 45.3 %    Monocyte % 8.7 5.0 - 12.0 %    Eosinophil % 3.7 0.3 - 6.2 %    Basophil % 0.6 0.0 - 1.5 %    Immature Grans % 0.2 0.0 - 0.5 %    Neutrophils, Absolute 2.71 1.70 - 7.00 10*3/mm3    Lymphocytes, Absolute 1.50 0.70 - 3.10 10*3/mm3    Monocytes, Absolute 0.42 0.10 - 0.90 10*3/mm3    Eosinophils, Absolute 0.18 0.00 - 0.40 10*3/mm3    Basophils, Absolute 0.03 0.00 - 0.20 10*3/mm3    Immature Grans, Absolute 0.01 0.00 - 0.05 10*3/mm3    nRBC 0.0 0.0 - 0.2 /100 WBC     *Note: Due to a large number of results and/or encounters for the requested time period, some results have not been displayed. A complete set of results can be found in Results Review.

## 2023-04-17 DIAGNOSIS — E55.9 VITAMIN D DEFICIENCY: ICD-10-CM

## 2023-04-17 RX ORDER — METHOCARBAMOL 750 MG/1
TABLET ORAL
Qty: 12 CAPSULE | Refills: 11 | Status: SHIPPED | OUTPATIENT
Start: 2023-04-17

## 2023-05-15 ENCOUNTER — OFFICE VISIT (OUTPATIENT)
Dept: FAMILY MEDICINE CLINIC | Facility: CLINIC | Age: 78
End: 2023-05-15
Payer: MEDICARE

## 2023-05-15 VITALS
DIASTOLIC BLOOD PRESSURE: 72 MMHG | HEIGHT: 66 IN | SYSTOLIC BLOOD PRESSURE: 132 MMHG | WEIGHT: 180 LBS | BODY MASS INDEX: 28.93 KG/M2 | HEART RATE: 72 BPM | OXYGEN SATURATION: 96 %

## 2023-05-15 DIAGNOSIS — K21.00 GASTROESOPHAGEAL REFLUX DISEASE WITH ESOPHAGITIS WITHOUT HEMORRHAGE: ICD-10-CM

## 2023-05-15 DIAGNOSIS — Z12.31 ENCOUNTER FOR SCREENING MAMMOGRAM FOR MALIGNANT NEOPLASM OF BREAST: Primary | ICD-10-CM

## 2023-05-15 DIAGNOSIS — E06.3 HYPOTHYROIDISM DUE TO HASHIMOTO'S THYROIDITIS: ICD-10-CM

## 2023-05-15 DIAGNOSIS — E03.8 HYPOTHYROIDISM DUE TO HASHIMOTO'S THYROIDITIS: ICD-10-CM

## 2023-05-15 NOTE — PROGRESS NOTES
Chief Complaint  Annual Exam (Due for mammogram )    Subjective          Akiko Blackwell presents to [facility] [place] for annual visit. She is not having any issues today, however she is due for her mammogram. She is very healthy 77 year old.       Hypothyroidism  This is a chronic problem. The current episode started more than 1 year ago. Associated symptoms include fatigue. Nothing aggravates the symptoms. Treatments tried: levothyroxine. The treatment provided moderate relief.   Heartburn  She complains of heartburn. This is a chronic problem. The current episode started more than 1 year ago. The problem occurs occasionally. The problem has been waxing and waning. The symptoms are aggravated by certain foods. Associated symptoms include fatigue. She has tried an antacid for the symptoms. The treatment provided mild relief.     Outpatient Medications Prior to Visit   Medication Sig Dispense Refill   • calcium carbonate (TUMS ULTRA) 1000 MG chewable tablet Chew 1 tablet Daily.     • Cyanocobalamin (B-12) 500 MCG sublingual tablet 500 mcgs under the tongue every other day 15 tablet 11   • D3-50 1.25 MG (09706 UT) capsule TAKE 1 CAPSULE EVERY 7 DAYS. 12 capsule 11   • levothyroxine (SYNTHROID, LEVOTHROID) 88 MCG tablet Take 1 tab daily Monday to Saturday and 1.5 tabs on Sunday 96 tablet 3   • multivitamin with minerals tablet tablet Take 1 tablet by mouth Daily. Vitamin C , Vitamin E , biotin, collagen     • omeprazole (priLOSEC) 40 MG capsule Take 1 capsule by mouth Daily. 90 capsule 1   • levothyroxine (SYNTHROID, LEVOTHROID) 88 MCG tablet Daily.     • cetirizine (zyrTEC) 10 MG tablet Take 1 tablet by mouth Daily for 30 days. 30 tablet 0     No facility-administered medications prior to visit.       Review of Systems   Constitutional: Positive for fatigue.   Gastrointestinal: Positive for heartburn.         Objective   Vital Signs:   Visit Vitals  /72  "  Pulse 72   Ht 167.6 cm (65.98\")   Wt 81.6 kg (180 lb)   LMP  (LMP Unknown)   SpO2 96%   BMI 29.07 kg/m²     Physical Exam  Vitals and nursing note reviewed.   Constitutional:       Appearance: She is well-developed.   HENT:      Head: Normocephalic and atraumatic.   Eyes:      General: Lids are normal.      Conjunctiva/sclera: Conjunctivae normal.   Neck:      Thyroid: No thyroid mass or thyromegaly.      Trachea: Trachea normal. No tracheal tenderness.   Cardiovascular:      Rate and Rhythm: Normal rate.      Pulses: Normal pulses.      Heart sounds: Normal heart sounds.   Pulmonary:      Effort: Pulmonary effort is normal. No respiratory distress.      Breath sounds: Normal breath sounds. No wheezing.   Abdominal:      General: There is no distension.      Palpations: Abdomen is soft. There is no mass.   Musculoskeletal:         General: Normal range of motion.      Cervical back: Normal range of motion. No edema.   Skin:     General: Skin is warm and dry.      Coloration: Skin is not pale.      Findings: No abrasion, erythema or lesion.   Neurological:      Mental Status: She is alert and oriented to person, place, and time.   Psychiatric:         Mood and Affect: Mood is not anxious. Affect is not inappropriate.         Speech: Speech normal.         Behavior: Behavior normal.         Thought Content: Thought content normal.         Judgment: Judgment normal. Judgment is not impulsive.        Result Review :                 Assessment and Plan    Diagnoses and all orders for this visit:    1. Encounter for screening mammogram for malignant neoplasm of breast (Primary)  -     Mammo screening digital tomosynthesis bilateral w CAD; Future    2. Hypothyroidism due to Hashimoto's thyroiditis  -     Lipid Panel; Future    3. Gastroesophageal reflux disease with esophagitis without hemorrhage  -     Lipid Panel; Future      Continue to follow with GI and endo     Please have my lab drawn when you get 's labs " done    Mammogram ordered, they will call to schedule     The current medical regimen is effective;  continue present plan and medications.        Follow Up   Return in about 6 months (around 11/15/2023), or if symptoms worsen or fail to improve, for Next scheduled follow up.  Patient was given instructions and counseling regarding her condition or for health maintenance advice. Please see specific information pulled into the AVS if appropriate.           This document has been electronically signed by LOUISE Coto on May 16, 2023 08:21 CDT

## 2023-06-07 ENCOUNTER — HOSPITAL ENCOUNTER (EMERGENCY)
Facility: HOSPITAL | Age: 78
Discharge: HOME | End: 2023-06-07
Attending: STUDENT IN AN ORGANIZED HEALTH CARE EDUCATION/TRAINING PROGRAM
Payer: MEDICARE

## 2023-06-07 ENCOUNTER — APPOINTMENT (OUTPATIENT)
Dept: GENERAL RADIOLOGY | Facility: HOSPITAL | Age: 78
End: 2023-06-07
Payer: MEDICARE

## 2023-06-07 ENCOUNTER — APPOINTMENT (OUTPATIENT)
Dept: ULTRASOUND IMAGING | Facility: HOSPITAL | Age: 78
End: 2023-06-07
Payer: MEDICARE

## 2023-06-07 VITALS
SYSTOLIC BLOOD PRESSURE: 182 MMHG | BODY MASS INDEX: 28.77 KG/M2 | TEMPERATURE: 97.8 F | RESPIRATION RATE: 18 BRPM | OXYGEN SATURATION: 97 % | WEIGHT: 179 LBS | DIASTOLIC BLOOD PRESSURE: 84 MMHG | HEIGHT: 66 IN | HEART RATE: 73 BPM

## 2023-06-07 DIAGNOSIS — M25.461 KNEE EFFUSION, RIGHT: Primary | ICD-10-CM

## 2023-06-07 DIAGNOSIS — M79.89 RIGHT LEG SWELLING: ICD-10-CM

## 2023-06-07 LAB
ALBUMIN SERPL-MCNC: 4.5 G/DL (ref 3.5–5.2)
ALBUMIN/GLOB SERPL: 1.7 G/DL
ALP SERPL-CCNC: 114 U/L (ref 39–117)
ALT SERPL W P-5'-P-CCNC: 12 U/L (ref 1–33)
ANION GAP SERPL CALCULATED.3IONS-SCNC: 12 MMOL/L (ref 5–15)
AST SERPL-CCNC: 18 U/L (ref 1–32)
BASOPHILS # BLD AUTO: 0.04 10*3/MM3 (ref 0–0.2)
BASOPHILS NFR BLD AUTO: 0.6 % (ref 0–1.5)
BILIRUB SERPL-MCNC: 0.6 MG/DL (ref 0–1.2)
BUN SERPL-MCNC: 11 MG/DL (ref 8–23)
BUN/CREAT SERPL: 12.2 (ref 7–25)
CALCIUM SPEC-SCNC: 10.1 MG/DL (ref 8.6–10.5)
CHLORIDE SERPL-SCNC: 103 MMOL/L (ref 98–107)
CO2 SERPL-SCNC: 28 MMOL/L (ref 22–29)
CREAT SERPL-MCNC: 0.9 MG/DL (ref 0.57–1)
CRP SERPL-MCNC: <0.3 MG/DL (ref 0–0.5)
DEPRECATED RDW RBC AUTO: 39.3 FL (ref 37–54)
EGFRCR SERPLBLD CKD-EPI 2021: 65.6 ML/MIN/1.73
EOSINOPHIL # BLD AUTO: 0.07 10*3/MM3 (ref 0–0.4)
EOSINOPHIL NFR BLD AUTO: 1.1 % (ref 0.3–6.2)
ERYTHROCYTE [DISTWIDTH] IN BLOOD BY AUTOMATED COUNT: 11.9 % (ref 12.3–15.4)
ERYTHROCYTE [SEDIMENTATION RATE] IN BLOOD: 1 MM/HR (ref 0–30)
GLOBULIN UR ELPH-MCNC: 2.6 GM/DL
GLUCOSE SERPL-MCNC: 102 MG/DL (ref 65–99)
HCT VFR BLD AUTO: 45.4 % (ref 34–46.6)
HGB BLD-MCNC: 15.5 G/DL (ref 12–15.9)
HOLD SPECIMEN: NORMAL
IMM GRANULOCYTES # BLD AUTO: 0.01 10*3/MM3 (ref 0–0.05)
IMM GRANULOCYTES NFR BLD AUTO: 0.2 % (ref 0–0.5)
LYMPHOCYTES # BLD AUTO: 1.77 10*3/MM3 (ref 0.7–3.1)
LYMPHOCYTES NFR BLD AUTO: 27 % (ref 19.6–45.3)
MCH RBC QN AUTO: 30.2 PG (ref 26.6–33)
MCHC RBC AUTO-ENTMCNC: 34.1 G/DL (ref 31.5–35.7)
MCV RBC AUTO: 88.5 FL (ref 79–97)
MONOCYTES # BLD AUTO: 0.44 10*3/MM3 (ref 0.1–0.9)
MONOCYTES NFR BLD AUTO: 6.7 % (ref 5–12)
NEUTROPHILS NFR BLD AUTO: 4.22 10*3/MM3 (ref 1.7–7)
NEUTROPHILS NFR BLD AUTO: 64.4 % (ref 42.7–76)
NRBC BLD AUTO-RTO: 0 /100 WBC (ref 0–0.2)
PLATELET # BLD AUTO: 301 10*3/MM3 (ref 140–450)
PMV BLD AUTO: 9.6 FL (ref 6–12)
POTASSIUM SERPL-SCNC: 3.9 MMOL/L (ref 3.5–5.2)
PROT SERPL-MCNC: 7.1 G/DL (ref 6–8.5)
RBC # BLD AUTO: 5.13 10*6/MM3 (ref 3.77–5.28)
SODIUM SERPL-SCNC: 143 MMOL/L (ref 136–145)
WBC NRBC COR # BLD: 6.55 10*3/MM3 (ref 3.4–10.8)
WHOLE BLOOD HOLD COAG: NORMAL

## 2023-06-07 PROCEDURE — 73562 X-RAY EXAM OF KNEE 3: CPT

## 2023-06-07 PROCEDURE — 85025 COMPLETE CBC W/AUTO DIFF WBC: CPT

## 2023-06-07 PROCEDURE — 80053 COMPREHEN METABOLIC PANEL: CPT

## 2023-06-07 PROCEDURE — 85652 RBC SED RATE AUTOMATED: CPT

## 2023-06-07 PROCEDURE — 93971 EXTREMITY STUDY: CPT

## 2023-06-07 PROCEDURE — 99283 EMERGENCY DEPT VISIT LOW MDM: CPT

## 2023-06-07 PROCEDURE — 86140 C-REACTIVE PROTEIN: CPT

## 2023-06-07 RX ORDER — SODIUM CHLORIDE 0.9 % (FLUSH) 0.9 %
10 SYRINGE (ML) INJECTION AS NEEDED
Status: DISCONTINUED | OUTPATIENT
Start: 2023-06-07 | End: 2023-06-07 | Stop reason: HOSPADM

## 2023-06-08 NOTE — ED PROVIDER NOTES
Subjective   History of Present Illness  77 year old female patient presents to ER for complaint of swelling to right lower leg for past 2 weeks without injury, redness, or pain. She has a history of arthritis in right knee. She denies SOB, dizziness, HA, hx of blood clots, or fever. She denies tobacco, ETOH, or illicit drug use.     Review of Systems   Constitutional: Negative.  Negative for fever.   HENT: Negative.     Eyes: Negative.    Respiratory: Negative.  Negative for shortness of breath.    Cardiovascular: Negative.    Gastrointestinal: Negative.    Endocrine: Negative.    Genitourinary: Negative.    Musculoskeletal:  Positive for arthralgias. Negative for myalgias.        Swelling below right knee   Skin: Negative.    Allergic/Immunologic: Negative.    Neurological: Negative.  Negative for dizziness.   Hematological: Negative.    Psychiatric/Behavioral: Negative.       Past Medical History:   Diagnosis Date    Allergic rhinitis     Atrophic vaginitis     Breast cyst     Diverticular disease of colon     Esophagitis     Family history of malignant neoplasm of gastrointestinal tract     GERD (gastroesophageal reflux disease)     Multiple gastric polyps 01/07/2020    Osteopenia     Subclinical hypothyroidism     Thyroid nodule     Vitamin D deficiency        No Known Allergies    Past Surgical History:   Procedure Laterality Date    BREAST BIOPSY      BX BREAST PERCUT W/O IMAGE 28005 (1)   x2    CERVIX SURGERY  05/15/1973    Conization biopsy of cervix. Cervical cautery.    COLONOSCOPY  06/20/2016    Diverticulosis in the sigmoid colon and in the descending colon.External and internal hemorrhoids.No specimens collected.    COLONOSCOPY N/A 6/30/2021    Procedure: COLONOSCOPY;  Surgeon: Alfonzo Wahl MD;  Location: [facility] ENDOSCOPY;  Service: Gastroenterology;  Laterality: N/A;    DILATATION AND CURETTAGE  10/10/1990    Fractional    ENDOSCOPY N/A 1/7/2020    Procedure: ESOPHAGOGASTRODUODENOSCOPY WITH  "DILATATION--possible;  Surgeon: Alfonzo Wahl MD;  Location: [facility] ENDOSCOPY;  Service: Gastroenterology    ENDOSCOPY N/A 6/30/2021    Procedure: ESOPHAGOGASTRODUODENOSCOPY;  Surgeon: Alfonzo Wahl MD;  Location: [facility] ENDOSCOPY;  Service: Gastroenterology;  Laterality: N/A;    ENDOSCOPY W/ PEG TUBE PLACEMENT  06/20/2012    Esophagitis seen. Biopsy taken. Gastritis in stomach. Biopsy taken. Hiatus hernia in stomach. Normal duodenum. Biopsy taken.    EXCISION LESION  05/18/1967    Excision of inclusion cyst, vagina.    TONSILLECTOMY  01/15/1964    Recurring acute tonsillitis.    UPPER GASTROINTESTINAL ENDOSCOPY  06/20/2012    UPPER GASTROINTESTINAL ENDOSCOPY  01/07/2020    UPPER GASTROINTESTINAL ENDOSCOPY  06/30/2021       Family History   Problem Relation Age of Onset    Thyroid disease Mother     Cancer Mother     Ovarian cancer Mother         Onset age 70-74 years.    Cancer Brother         Colorectal Cancer, onset age 70-74 years.    Colon cancer Brother     Diabetes Other        Social History     Socioeconomic History    Marital status:    Tobacco Use    Smoking status: Former    Smokeless tobacco: Never   Vaping Use    Vaping Use: Never used   Substance and Sexual Activity    Alcohol use: No    Drug use: No    Sexual activity: Defer           Objective   /71 (BP Location: Right arm, Patient Position: Sitting)   Pulse 72   Temp 97.8 °F (36.6 °C) (Oral)   Resp 18   Ht 167.6 cm (66\")   Wt 81.2 kg (179 lb)   LMP  (LMP Unknown)   SpO2 98%   BMI 28.89 kg/m²     Physical Exam  Vitals and nursing note reviewed.   Constitutional:       General: She is not in acute distress.     Appearance: Normal appearance. She is not ill-appearing, toxic-appearing or diaphoretic.   HENT:      Head: Normocephalic.      Nose: Nose normal.      Mouth/Throat:      Mouth: Mucous membranes are moist.   Eyes:      Pupils: Pupils are equal, round, and reactive to light.   Cardiovascular:      Rate and Rhythm: " Normal rate and regular rhythm.      Pulses: Normal pulses.      Heart sounds: Normal heart sounds.   Pulmonary:      Effort: Pulmonary effort is normal.      Breath sounds: Normal breath sounds.   Abdominal:      General: Bowel sounds are normal.      Palpations: Abdomen is soft.   Musculoskeletal:         General: Swelling present. No tenderness, deformity or signs of injury. Normal range of motion.      Cervical back: Normal range of motion.      Comments: Swelling appreciated to right lower leg, not including foot or knee, without erythema or tenderness. Negative Homans sign.    Skin:     General: Skin is warm and dry.      Capillary Refill: Capillary refill takes less than 2 seconds.   Neurological:      Mental Status: She is alert and oriented to person, place, and time.   Psychiatric:         Mood and Affect: Mood normal.         Behavior: Behavior normal.         Thought Content: Thought content normal.         Judgment: Judgment normal.       Procedures           ED Course  ED Course as of 06/07/23 2116 Wed Jun 07, 2023 2110 Spoke with patient about her radiology results and plan for discharge including referral to orthopedics and using a splint while awake.  She verbalizes understanding and is agreeable with plan.  [HS]   2115 She reports that she has a neoprene knee sleeve at home and declines an ace wrap at present.  [HS]      ED Course User Index  [HS] Val Elkins, LOUISE           Results for orders placed or performed during the hospital encounter of 06/07/23   Comprehensive Metabolic Panel    Specimen: Blood   Result Value Ref Range    Glucose 102 (H) 65 - 99 mg/dL    BUN 11 8 - 23 mg/dL    Creatinine 0.90 0.57 - 1.00 mg/dL    Sodium 143 136 - 145 mmol/L    Potassium 3.9 3.5 - 5.2 mmol/L    Chloride 103 98 - 107 mmol/L    CO2 28.0 22.0 - 29.0 mmol/L    Calcium 10.1 8.6 - 10.5 mg/dL    Total Protein 7.1 6.0 - 8.5 g/dL    Albumin 4.5 3.5 - 5.2 g/dL    ALT (SGPT) 12 1 - 33 U/L    AST (SGOT) 18  1 - 32 U/L    Alkaline Phosphatase 114 39 - 117 U/L    Total Bilirubin 0.6 0.0 - 1.2 mg/dL    Globulin 2.6 gm/dL    A/G Ratio 1.7 g/dL    BUN/Creatinine Ratio 12.2 7.0 - 25.0    Anion Gap 12.0 5.0 - 15.0 mmol/L    eGFR 65.6 >60.0 mL/min/1.73   Sedimentation Rate    Specimen: Blood   Result Value Ref Range    Sed Rate 1 0 - 30 mm/hr   CBC Auto Differential    Specimen: Blood   Result Value Ref Range    WBC 6.55 3.40 - 10.80 10*3/mm3    RBC 5.13 3.77 - 5.28 10*6/mm3    Hemoglobin 15.5 12.0 - 15.9 g/dL    Hematocrit 45.4 34.0 - 46.6 %    MCV 88.5 79.0 - 97.0 fL    MCH 30.2 26.6 - 33.0 pg    MCHC 34.1 31.5 - 35.7 g/dL    RDW 11.9 (L) 12.3 - 15.4 %    RDW-SD 39.3 37.0 - 54.0 fl    MPV 9.6 6.0 - 12.0 fL    Platelets 301 140 - 450 10*3/mm3    Neutrophil % 64.4 42.7 - 76.0 %    Lymphocyte % 27.0 19.6 - 45.3 %    Monocyte % 6.7 5.0 - 12.0 %    Eosinophil % 1.1 0.3 - 6.2 %    Basophil % 0.6 0.0 - 1.5 %    Immature Grans % 0.2 0.0 - 0.5 %    Neutrophils, Absolute 4.22 1.70 - 7.00 10*3/mm3    Lymphocytes, Absolute 1.77 0.70 - 3.10 10*3/mm3    Monocytes, Absolute 0.44 0.10 - 0.90 10*3/mm3    Eosinophils, Absolute 0.07 0.00 - 0.40 10*3/mm3    Basophils, Absolute 0.04 0.00 - 0.20 10*3/mm3    Immature Grans, Absolute 0.01 0.00 - 0.05 10*3/mm3    nRBC 0.0 0.0 - 0.2 /100 WBC     XR Knee 3 View Right    Result Date: 6/7/2023  XR KNEE RIGHT 3 VIEWS HISTORY: swelling and pain without injury COMPARISON: None FINDINGS: 3 views of the right knee were obtained. There is no fracture or dislocation. Moderate joint space narrowing is seen in the medial compartment. A small joint effusion is present, and mild degenerative changes are seen in the patellofemoral compartment and lateral compartment.    US Venous Doppler Lower Extremity Right (duplex)    Result Date: 6/7/2023  US VENOUS DOPPLER LOWER EXTREMITY RIGHT (DUPLEX) HISTORY:  swelling below knee on right COMPARISON: NONE FINDINGS: Transverse and longitudinal grayscale and Doppler  sonographic images of the right lower extremity were obtained. Spectral analysis was also obtained. There is normal flow and compressibility of the right common femoral, superficial femoral, popliteal, and calf veins.     1. Normal duplex ultrasound of the right lower extremity without evidence of DVT.                                      Medical Decision Making  Problems Addressed:  Knee effusion, right: complicated acute illness or injury  Right leg swelling: complicated acute illness or injury    Amount and/or Complexity of Data Reviewed  Labs: ordered.  Radiology: ordered.    Risk  Prescription drug management.        Final diagnoses:   Knee effusion, right   Right leg swelling       ED Disposition  ED Disposition       ED Disposition   Discharge    Condition   Stable    Comment   --               Fazal Cabral MD  [address]  Entrance [place] [address]  [phone]    Call   ER follow up, call for appointment         Medication List      No changes were made to your prescriptions during this visit.            Val Elkins, LOUISE  06/07/23 2115       Val Elkins, LOUISE  06/07/23 2116

## 2023-06-08 NOTE — DISCHARGE INSTRUCTIONS
Home to rest. Keep affected leg elevated while at rest. Wear neoprene knee sleeve for compression while awake as tolerated. Follow up with orthopedics, call number provided to schedule appointment. Return for worsening symptoms such as fever, redness to leg, or pain.

## 2023-06-21 PROBLEM — M79.661 PAIN IN RIGHT LOWER LEG: Status: ACTIVE | Noted: 2023-06-21

## 2023-07-09 PROBLEM — M25.461 EFFUSION OF RIGHT KNEE: Status: ACTIVE | Noted: 2023-07-09

## 2023-07-09 PROBLEM — M25.661 STIFFNESS OF RIGHT KNEE, NOT ELSEWHERE CLASSIFIED: Status: ACTIVE | Noted: 2023-07-09

## 2023-07-18 PROBLEM — G89.29 CHRONIC PAIN OF RIGHT KNEE: Status: ACTIVE | Noted: 2023-07-18

## 2023-07-18 PROBLEM — M25.561 CHRONIC PAIN OF RIGHT KNEE: Status: ACTIVE | Noted: 2023-07-18

## 2023-08-16 ENCOUNTER — LAB (OUTPATIENT)
Dept: LAB | Facility: HOSPITAL | Age: 78
End: 2023-08-16
Payer: MEDICARE

## 2023-08-16 DIAGNOSIS — E03.8 HYPOTHYROIDISM DUE TO HASHIMOTO'S THYROIDITIS: ICD-10-CM

## 2023-08-16 DIAGNOSIS — M85.88 OSTEOPENIA OF LUMBAR SPINE: ICD-10-CM

## 2023-08-16 DIAGNOSIS — E06.3 HYPOTHYROIDISM DUE TO HASHIMOTO'S THYROIDITIS: ICD-10-CM

## 2023-08-16 DIAGNOSIS — E83.42 HYPOMAGNESEMIA: ICD-10-CM

## 2023-08-16 DIAGNOSIS — E53.8 LOW SERUM VITAMIN B12: ICD-10-CM

## 2023-08-16 DIAGNOSIS — E55.9 VITAMIN D DEFICIENCY: ICD-10-CM

## 2023-08-16 LAB
25(OH)D3 SERPL-MCNC: 89.2 NG/ML (ref 30–100)
ALBUMIN SERPL-MCNC: 4.1 G/DL (ref 3.5–5.2)
ALBUMIN/GLOB SERPL: 1.3 G/DL
ALP SERPL-CCNC: 113 U/L (ref 39–117)
ALT SERPL W P-5'-P-CCNC: 12 U/L (ref 1–33)
ANION GAP SERPL CALCULATED.3IONS-SCNC: 8 MMOL/L (ref 5–15)
AST SERPL-CCNC: 16 U/L (ref 1–32)
BASOPHILS # BLD AUTO: 0.04 10*3/MM3 (ref 0–0.2)
BASOPHILS NFR BLD AUTO: 0.7 % (ref 0–1.5)
BILIRUB SERPL-MCNC: 0.6 MG/DL (ref 0–1.2)
BUN SERPL-MCNC: 16 MG/DL (ref 8–23)
BUN/CREAT SERPL: 15.4 (ref 7–25)
CALCIUM SPEC-SCNC: 10 MG/DL (ref 8.6–10.5)
CHLORIDE SERPL-SCNC: 105 MMOL/L (ref 98–107)
CO2 SERPL-SCNC: 31 MMOL/L (ref 22–29)
CREAT SERPL-MCNC: 1.04 MG/DL (ref 0.57–1)
DEPRECATED RDW RBC AUTO: 39.6 FL (ref 37–54)
EGFRCR SERPLBLD CKD-EPI 2021: 55.1 ML/MIN/1.73
EOSINOPHIL # BLD AUTO: 0.1 10*3/MM3 (ref 0–0.4)
EOSINOPHIL NFR BLD AUTO: 1.7 % (ref 0.3–6.2)
ERYTHROCYTE [DISTWIDTH] IN BLOOD BY AUTOMATED COUNT: 12.2 % (ref 12.3–15.4)
GLOBULIN UR ELPH-MCNC: 3.1 GM/DL
GLUCOSE SERPL-MCNC: 104 MG/DL (ref 65–99)
HCT VFR BLD AUTO: 44.4 % (ref 34–46.6)
HGB BLD-MCNC: 15.4 G/DL (ref 12–15.9)
IMM GRANULOCYTES # BLD AUTO: 0.01 10*3/MM3 (ref 0–0.05)
IMM GRANULOCYTES NFR BLD AUTO: 0.2 % (ref 0–0.5)
LYMPHOCYTES # BLD AUTO: 1.92 10*3/MM3 (ref 0.7–3.1)
LYMPHOCYTES NFR BLD AUTO: 31.7 % (ref 19.6–45.3)
MAGNESIUM SERPL-MCNC: 2.2 MG/DL (ref 1.6–2.4)
MCH RBC QN AUTO: 30.6 PG (ref 26.6–33)
MCHC RBC AUTO-ENTMCNC: 34.7 G/DL (ref 31.5–35.7)
MCV RBC AUTO: 88.1 FL (ref 79–97)
MONOCYTES # BLD AUTO: 0.48 10*3/MM3 (ref 0.1–0.9)
MONOCYTES NFR BLD AUTO: 7.9 % (ref 5–12)
NEUTROPHILS NFR BLD AUTO: 3.5 10*3/MM3 (ref 1.7–7)
NEUTROPHILS NFR BLD AUTO: 57.8 % (ref 42.7–76)
NRBC BLD AUTO-RTO: 0 /100 WBC (ref 0–0.2)
PLATELET # BLD AUTO: 327 10*3/MM3 (ref 140–450)
PMV BLD AUTO: 9.7 FL (ref 6–12)
POTASSIUM SERPL-SCNC: 4.8 MMOL/L (ref 3.5–5.2)
PROT SERPL-MCNC: 7.2 G/DL (ref 6–8.5)
RBC # BLD AUTO: 5.04 10*6/MM3 (ref 3.77–5.28)
SODIUM SERPL-SCNC: 144 MMOL/L (ref 136–145)
TSH SERPL DL<=0.05 MIU/L-ACNC: 4.01 UIU/ML (ref 0.27–4.2)
VIT B12 BLD-MCNC: 490 PG/ML (ref 211–946)
WBC NRBC COR # BLD: 6.05 10*3/MM3 (ref 3.4–10.8)

## 2023-08-16 PROCEDURE — 85025 COMPLETE CBC W/AUTO DIFF WBC: CPT

## 2023-08-16 PROCEDURE — 83735 ASSAY OF MAGNESIUM: CPT

## 2023-08-16 PROCEDURE — 36415 COLL VENOUS BLD VENIPUNCTURE: CPT

## 2023-08-16 PROCEDURE — 82306 VITAMIN D 25 HYDROXY: CPT

## 2023-08-16 PROCEDURE — 82607 VITAMIN B-12: CPT

## 2023-08-16 PROCEDURE — 80053 COMPREHEN METABOLIC PANEL: CPT

## 2023-08-16 PROCEDURE — 84443 ASSAY THYROID STIM HORMONE: CPT

## 2023-08-23 ENCOUNTER — DOCUMENTATION (OUTPATIENT)
Dept: ENDOCRINOLOGY | Facility: CLINIC | Age: 78
End: 2023-08-23
Payer: COMMERCIAL

## 2023-08-23 ENCOUNTER — OFFICE VISIT (OUTPATIENT)
Dept: ENDOCRINOLOGY | Facility: CLINIC | Age: 78
End: 2023-08-23
Payer: MEDICARE

## 2023-08-23 VITALS
DIASTOLIC BLOOD PRESSURE: 70 MMHG | BODY MASS INDEX: 29.66 KG/M2 | HEART RATE: 77 BPM | WEIGHT: 178 LBS | OXYGEN SATURATION: 96 % | HEIGHT: 65 IN | SYSTOLIC BLOOD PRESSURE: 140 MMHG

## 2023-08-23 DIAGNOSIS — E06.3 HYPOTHYROIDISM DUE TO HASHIMOTO'S THYROIDITIS: Primary | ICD-10-CM

## 2023-08-23 DIAGNOSIS — E55.9 VITAMIN D DEFICIENCY: ICD-10-CM

## 2023-08-23 DIAGNOSIS — E53.8 LOW SERUM VITAMIN B12: ICD-10-CM

## 2023-08-23 DIAGNOSIS — M85.88 OSTEOPENIA OF LUMBAR SPINE: ICD-10-CM

## 2023-08-23 DIAGNOSIS — E03.8 HYPOTHYROIDISM DUE TO HASHIMOTO'S THYROIDITIS: Primary | ICD-10-CM

## 2023-08-23 DIAGNOSIS — E83.42 HYPOMAGNESEMIA: ICD-10-CM

## 2023-08-23 RX ORDER — LEVOTHYROXINE SODIUM 88 UG/1
TABLET ORAL
Qty: 96 TABLET | Refills: 3 | Status: SHIPPED | OUTPATIENT
Start: 2023-08-23

## 2023-08-23 NOTE — PROGRESS NOTES
"Chief Complaint  Hypothyroidism                                            Hypothyroidism    Hashimoto's Thyroiditis    Thyroid Problem         reason - hypothyroidism     duration - March 2013     alleviating factors - levothyroxine     symptoms - hair falling and brittle nails - improved     --     thyroid nodule  personal interpretation , pseudonodule     Feb 2015 compared to Nov 2016   my interpretation - pseudonodules   radiologist interpreation - slight increase in size from 9 mm to 1 cm of largest nodule                ---     Ostepenia    On vit D and calcium      h o  jaw fracture,      Her only present problem is GERD     Now on B12 and feels some improvement      Objective   Vital Signs:   /70   Pulse 77   Ht 163.8 cm (64.5\")   Wt 80.7 kg (178 lb)   SpO2 96%   BMI 30.08 kg/mý     Physical Exam  Constitutional:       Appearance: Normal appearance.   HENT:      Head: Normocephalic.   Cardiovascular:      Rate and Rhythm: Normal rate and regular rhythm.   Pulmonary:      Effort: Pulmonary effort is normal.      Breath sounds: Normal breath sounds.   Musculoskeletal:      Cervical back: Normal range of motion and neck supple.      Right lower leg: No edema.      Left lower leg: No edema.   Neurological:      Mental Status: She is alert.    mild  RLE    Result Review :   The following data was reviewed by: Maximino Paige MD on 01/25/2021:  Common labs          2/15/2023    07:25 6/7/2023    20:54 8/16/2023    08:06   Common Labs   Glucose 97  102  104    BUN 13  11  16    Creatinine 0.76  0.90  1.04    Sodium 141  143  144    Potassium 4.3  3.9  4.8    Chloride 105  103  105    Calcium 9.6  10.1  10.0    Albumin 4.2  4.5  4.1    Total Bilirubin 0.6  0.6  0.6    Alkaline Phosphatase 106  114  113    AST (SGOT) 16  18  16    ALT (SGPT) 13  12  12    WBC 5.99  6.55  6.05    Hemoglobin 15.3  15.5  15.4    Hematocrit 44.7  45.4  44.4    Platelets 311  301  327      Thyroid Workup    Lab " Results   Component Value Date    TSH 4.010 08/16/2023    TSH 2.530 02/15/2023       Lab Results   Component Value Date    FREET4 1.23 04/15/2015    FREET4 1.48 02/26/2015       No results found for: T3FREE    TPO antibodies    Lab Results   Component Value Date    THYROIDAB 25.1 (H) 02/04/2014       TSI antibodies    No results found for: THYRSTIMIMMU    --------------------------    Lab Results   Component Value Date    BLGZYKHZ83 490 08/16/2023       ---------------------------    Lab Results   Component Value Date    OIBQ24NK 89.2 08/16/2023    YKII28ZX 76.8 02/15/2023                              Assessment and Plan       Diagnosis Plan   1. Hypothyroidism due to Hashimoto's thyroiditis        2. Low serum vitamin B12        3. Vitamin D deficiency        4. Osteopenia of lumbar spine        5. Hypomagnesemia                  Hypothyroidism     on Levothyroxine 88 mcgs , 6.5 tabs per week - one hour before supper or at bedtime     Please go back to daily - increase to 7.5     Also on nexium - now dexilant ( changed to pepcid ) and ca + vit D - in a.m.      Low normal b12 - , on PPI , start 500 mcgs sublingual qod , working     Magnesium, monitor on future appts               ---     Osteopenia    COMPARISON: DEXA 5/30/2018     FINDINGS:      PA Spine L1-L4         Density: 0.834 g/cm2  T-score: -1.9   Z-score: 0.5     2.3% decrease from most recent prior.     Mean Bilateral Femoral Neck            Density: 0.753 g/cm2  T-score: -0.9   Z-score: 1.3           on 600 D + 400 Calcium     add extra 1000 u daily - stop      Add 50 th u every 2 weeks - every week every 2 weeks      Last bone density 8-2020     Repeat today      ---     Pseudonodules last US from June 2015 compared to Nov 2016      Repeat and stable , repeat only if clinically there is a palpable nodule      --     on nexium - now on dexilant - now pepcid   she rightufully requests for Mg assessment and nl      --     Sec polycythemia     reasses      If persistent do sleep study - resolved           Right carotid bruit, carotid US done ,    IMPRESSION:  1. There is 50-70% stenosis in the distal right ICA by peak  velocity criteria without significant plaque appreciated. This  may be due to tortuosity.      2. No significant atherosclerotic plaque       Reassess cholesterol   At goal     Mild RLE , use compression stockings           This document has been electronically signed by Maximino Paige MD on August 23, 2023 14:47 CDT

## 2023-08-23 NOTE — PROGRESS NOTES
Scheduled patient for a Bone Density on August 29th at 3pm at the [facility].

## 2023-08-31 ENCOUNTER — TELEPHONE (OUTPATIENT)
Dept: ENDOCRINOLOGY | Facility: CLINIC | Age: 78
End: 2023-08-31
Payer: COMMERCIAL

## 2023-08-31 NOTE — TELEPHONE ENCOUNTER
----- Message from Maximino Paige MD sent at 8/29/2023  8:10 PM CDT -----  Would you please let her know that her bone density reveals stability .  Please continue the same supplementation of calcium and vitamin D

## (undated) DEVICE — BITEBLOCK ENDO W/STRAP 60F A/ LF DISP

## (undated) DEVICE — CANN SMPL SOFTECH BIFLO ETCO2 A/M 7FT

## (undated) DEVICE — SINGLE-USE BIOPSY FORCEPS: Brand: RADIAL JAW 4